# Patient Record
Sex: FEMALE | Race: WHITE
[De-identification: names, ages, dates, MRNs, and addresses within clinical notes are randomized per-mention and may not be internally consistent; named-entity substitution may affect disease eponyms.]

---

## 2019-02-11 ENCOUNTER — HOSPITAL ENCOUNTER (EMERGENCY)
Dept: HOSPITAL 75 - ER | Age: 64
Discharge: HOME | End: 2019-02-11
Payer: MEDICARE

## 2019-02-11 ENCOUNTER — HOSPITAL ENCOUNTER (OUTPATIENT)
Dept: HOSPITAL 75 - RAD | Age: 64
End: 2019-02-11
Attending: NURSE PRACTITIONER
Payer: MEDICARE

## 2019-02-11 VITALS — BODY MASS INDEX: 51.91 KG/M2 | HEIGHT: 63 IN | WEIGHT: 293 LBS

## 2019-02-11 VITALS — DIASTOLIC BLOOD PRESSURE: 65 MMHG | SYSTOLIC BLOOD PRESSURE: 142 MMHG

## 2019-02-11 DIAGNOSIS — Z87.19: ICD-10-CM

## 2019-02-11 DIAGNOSIS — Z90.49: ICD-10-CM

## 2019-02-11 DIAGNOSIS — E78.49: Primary | ICD-10-CM

## 2019-02-11 DIAGNOSIS — Z90.89: ICD-10-CM

## 2019-02-11 DIAGNOSIS — E78.00: ICD-10-CM

## 2019-02-11 DIAGNOSIS — M19.90: ICD-10-CM

## 2019-02-11 DIAGNOSIS — I10: ICD-10-CM

## 2019-02-11 DIAGNOSIS — K59.00: Primary | ICD-10-CM

## 2019-02-11 DIAGNOSIS — H40.89: ICD-10-CM

## 2019-02-11 DIAGNOSIS — R11.0: ICD-10-CM

## 2019-02-11 DIAGNOSIS — R10.9: ICD-10-CM

## 2019-02-11 LAB
ALBUMIN SERPL-MCNC: 4.2 GM/DL (ref 3.2–4.5)
ALP SERPL-CCNC: 96 U/L (ref 40–136)
ALT SERPL-CCNC: 22 U/L (ref 0–55)
APTT PPP: YELLOW S
BACTERIA #/AREA URNS HPF: (no result) /HPF
BASOPHILS # BLD AUTO: 0 10^3/UL (ref 0–0.1)
BASOPHILS NFR BLD AUTO: 0 % (ref 0–10)
BILIRUB SERPL-MCNC: 0.5 MG/DL (ref 0.1–1)
BILIRUB UR QL STRIP: NEGATIVE
BUN/CREAT SERPL: 26
CALCIUM SERPL-MCNC: 10.6 MG/DL (ref 8.5–10.1)
CHLORIDE SERPL-SCNC: 106 MMOL/L (ref 98–107)
CO2 SERPL-SCNC: 22 MMOL/L (ref 21–32)
CREAT SERPL-MCNC: 1.18 MG/DL (ref 0.6–1.3)
EOSINOPHIL # BLD AUTO: 0.2 10^3/UL (ref 0–0.3)
EOSINOPHIL NFR BLD AUTO: 2 % (ref 0–10)
ERYTHROCYTE [DISTWIDTH] IN BLOOD BY AUTOMATED COUNT: 13 % (ref 10–14.5)
FIBRINOGEN PPP-MCNC: CLEAR MG/DL
GFR SERPLBLD BASED ON 1.73 SQ M-ARVRAT: 46 ML/MIN
GLUCOSE SERPL-MCNC: 154 MG/DL (ref 70–105)
GLUCOSE UR STRIP-MCNC: NEGATIVE MG/DL
HCT VFR BLD CALC: 39 % (ref 35–52)
HGB BLD-MCNC: 13.2 G/DL (ref 11.5–16)
KETONES UR QL STRIP: (no result)
LEUKOCYTE ESTERASE UR QL STRIP: NEGATIVE
LYMPHOCYTES # BLD AUTO: 2.5 X 10^3 (ref 1–4)
LYMPHOCYTES NFR BLD AUTO: 28 % (ref 12–44)
MANUAL DIFFERENTIAL PERFORMED BLD QL: NO
MCH RBC QN AUTO: 30 PG (ref 25–34)
MCHC RBC AUTO-ENTMCNC: 34 G/DL (ref 32–36)
MCV RBC AUTO: 90 FL (ref 80–99)
MONOCYTES # BLD AUTO: 0.6 X 10^3 (ref 0–1)
MONOCYTES NFR BLD AUTO: 7 % (ref 0–12)
NEUTROPHILS # BLD AUTO: 5.5 X 10^3 (ref 1.8–7.8)
NEUTROPHILS NFR BLD AUTO: 62 % (ref 42–75)
NITRITE UR QL STRIP: NEGATIVE
PH UR STRIP: 5 [PH] (ref 5–9)
PLATELET # BLD: 359 10^3/UL (ref 130–400)
PMV BLD AUTO: 10.1 FL (ref 7.4–10.4)
POTASSIUM SERPL-SCNC: 4.1 MMOL/L (ref 3.6–5)
PROT SERPL-MCNC: 7.1 GM/DL (ref 6.4–8.2)
PROT UR QL STRIP: NEGATIVE
RBC #/AREA URNS HPF: (no result) /HPF
SODIUM SERPL-SCNC: 140 MMOL/L (ref 135–145)
SP GR UR STRIP: 1.02 (ref 1.02–1.02)
SQUAMOUS #/AREA URNS HPF: (no result) /HPF
UROBILINOGEN UR-MCNC: NORMAL MG/DL
WBC # BLD AUTO: 8.8 10^3/UL (ref 4.3–11)
WBC #/AREA URNS HPF: (no result) /HPF

## 2019-02-11 PROCEDURE — 87186 SC STD MICRODIL/AGAR DIL: CPT

## 2019-02-11 PROCEDURE — 85025 COMPLETE CBC W/AUTO DIFF WBC: CPT

## 2019-02-11 PROCEDURE — 74177 CT ABD & PELVIS W/CONTRAST: CPT

## 2019-02-11 PROCEDURE — 36415 COLL VENOUS BLD VENIPUNCTURE: CPT

## 2019-02-11 PROCEDURE — 74019 RADEX ABDOMEN 2 VIEWS: CPT

## 2019-02-11 PROCEDURE — 87077 CULTURE AEROBIC IDENTIFY: CPT

## 2019-02-11 PROCEDURE — 87088 URINE BACTERIA CULTURE: CPT

## 2019-02-11 PROCEDURE — 80053 COMPREHEN METABOLIC PANEL: CPT

## 2019-02-11 PROCEDURE — 86141 C-REACTIVE PROTEIN HS: CPT

## 2019-02-11 PROCEDURE — 81000 URINALYSIS NONAUTO W/SCOPE: CPT

## 2019-02-11 NOTE — XMS REPORT
MU2 Ambulatory Summary

 Created on: 2016



Elena Deluca

External Reference #: [id number]

: 1955

Sex: Female



Demographics







 Address  [address]

[place]  [address]

 

 Home Phone  [phone]

 

 Preferred Language  English

 

 Marital Status  

 

 Congregational Affiliation  Unknown

 

 Race  White

 

 Ethnic Group  Not  or 





Author







 Author  Brain Hauser

 

 [facility]

 

 Address  [address]

[place], [place]  [id number]



 

 Phone  [phone]







Care Team Providers







 Care Team Member Name  Role  Phone

 

 Brain Hauser  PCP  Unavailable







Allergies and Adverse Reactions







 Name  Reaction  Notes

 

 NO KNOWN DRUG ALLERGIES      







Plan of Treatment

Not available.



Medications







 Active 

 

 Name  Start Date  Estimated Completion Date  SIG  Comments

 

 Calcium 600mg        one tablet daily   

 

 Multivitamin Oral Tablet        take 1 tablet by oral route daily   

 

 aspirin 81 mg oral tablet        take 1 tablet (81 mg) by oral route once 
daily   

 

 Fish Oil 1,000 mg oral capsule  2010     take 2 capsules by oral route 
daily   

 

 valsartan-hydrochlorothiazide 160-12.5 mg oral tablet  2014     TAKE ONE 
TABLET BY MOUTH EVERY DAY   

 

 simvastatin 20 mg oral tablet  2014     TAKE ONE TABLET BY MOUTH EVERY 
DAY IN THE EVENING   

 

 Osteo Bi-Flex 250-200 mg oral tablet        take 1 tablet by oral route daily 
  

 

 simvastatin 20 mg oral tablet  2014     TAKE ONE TABLET BY MOUTH EVERY 
DAY IN THE EVENING   

 

 valsartan-hydrochlorothiazide 160-12.5 mg oral tablet  2015     TAKE ONE 
TABLET BY MOUTH EVERY DAY   

 

 simvastatin 20 mg oral tablet  2015     TAKE ONE TABLET BY MOUTH ONCE 
DAILY AT BEDTIME   

 

 valsartan-hydrochlorothiazide 160-12.5 mg oral tablet  2015     TAKE ONE 
TABLET BY MOUTH ONCE DAILY   

 

 valsartan-hydrochlorothiazide 160-12.5 mg oral tablet  8/3/2015     TAKE ONE 
TABLET BY MOUTH ONCE DAILY   

 

 simvastatin 20 mg oral tablet  2015     TAKE ONE TABLET BY MOUTH ONCE 
DAILY AT BEDTIME   

 

 amlodipine 10 mg oral tablet  10/18/2015     TAKE ONE TABLET BY MOUTH ONCE 
DAILY   

 

 valsartan-hydrochlorothiazide 160-12.5 mg oral tablet  2016     TAKE ONE 
TABLET BY MOUTH ONCE DAILY   

 

 simvastatin 20 mg oral tablet  2016     TAKE ONE TABLET BY MOUTH ONCE 
DAILY AT BEDTIME   

 

 valsartan-hydrochlorothiazide 160-12.5 mg oral tablet  2016     TAKE ONE 
TABLET BY MOUTH ONCE DAILY   

 

 simvastatin 20 mg oral tablet  2016     TAKE ONE TABLET BY MOUTH ONCE 
DAILY AT BEDTIME   

 

 amlodipine 10 mg oral tablet  10/9/2016     TAKE ONE TABLET BY MOUTH ONCE 
DAILY   









  

 

 Name  Start Date  Expiration Date  SIG  Comments

 

 Augmentin 500-125 mg oral tablet  10/8/2012  10/15/2012  take 1 tablet by oral 
route every 12 hours for 7 days   

 

 Diflucan 100 mg oral tablet  10/8/2012  10/11/2012  take 1 tablet (100 mg) by 
oral route once daily   

 

 amoxicillin 500 mg oral capsule  2013  take 1 capsule (500 mg) 
by oral route 3 times per day for 10 days   

 

 simvastatin 20 mg oral tablet  9/10/2013  10/10/2013  TAKE ONE TABLET BY MOUTH 
EVERY DAY IN THE EVENING   

 

 amlodipine 10 mg oral tablet  2014  TAKE ONE TABLET BY MOUTH 
EVERY DAY   

 

 Augmentin 500-125 mg oral tablet  2015  take 1 tablet by oral 
route every 12 hours for 7 days   









 Discontinued 

 

 Name  Start Date  Discontinued Date  SIG  Comments

 

 niacin 500 mg oral tablet extended release 24 hr     2011  take 1 tablets 
(500 mg) by oral route once daily  Starting on Simvastatin

 

 Vesicare 5 mg oral tablet  2011  take 1 tablet by oral route 
daily for 30 days   

 

 Osteo Bi-Flex (5-Loxin) 1,500-400-100 mg-unit-mg oral tablet     2013  
take 1 tablet by oral route daily   

 

 Diovan -12.5 mg oral tablet  2012  TAKE ONE TABLET BY 
MOUTH EVERY DAY  generic on list

 

 Medrol (Lei) 4 mg oral tablets,dose pack  2013  take as 
directed   

 

 nystatin-triamcinolone 100,000-0.1 unit/g-% topical cream  2013  apply to affected area(s) by topical route 2 times a day   

 

 fexofenadine 180 mg oral tablet  2013  take 1 tablet (180 mg) 
by oral route once daily   

 

 prednisone 20 mg oral tablet  10/26/2015  2016  take 3 tablets (60 mg) by 
oral route once daily for 3 days then 2 tablets (40 mg) daily for 2 days   

 

 cyclobenzaprine 10 mg oral tablet  10/30/2015  2016  take 1 tablet (10 mg
) by oral route 3 times per day   







Problem List







 Description  Status  Onset

 

 Arthritis unspecified  Active   

 

 Hyperlipidemia  Active   

 

 Hypertension  Active   







Vital Signs







 Date  Time  BP-Sys(mm[Hg]  BP-Edelmira(mm[Hg])  HR(bpm)  RR(rpm)  Temp  WT  HT  HC  
BMI  BSA  BMI Percentile  O2 Sat(%)

 

 2016  7:58:00 AM  136 mmHg  70 mmHg  85 bpm  20 rpm  98.1 F  391 lbs  64 
in     67.11 kg/m2  2.83 m2     95 %

 

 10/26/2015  8:35:00 AM  124 mmHg  70 mmHg  80 bpm  18 rpm  97.1 F     64 in   
            

 

 2015  9:50:00 AM  132 mmHg  88 mmHg  90 bpm  22 rpm  98.5 F  387 lbs  64 
in     66.43 kg/m2  2.82 m2     97 %

 

 2014  10:36:00 AM  142 mmHg  80 mmHg  78 bpm  22 rpm  97.9 F  381 lbs  64 
in     65.40 kg/m2  2.7935 m     97 %

 

 2013  8:04:00 AM  138 mmHg  70 mmHg  88 bpm  22 rpm  98.1 F  388 lbs  64 
in     66.5994 kg/m  2.82 m2     98 %

 

 2013  9:54:00 AM  136 mmHg  70 mmHg  88 bpm  24 rpm  97.1 F  386 lbs  64 
in     66.26 kg/m2  2.8118 m     97 %

 

 2013  9:47:00 AM  134 mmHg  78 mmHg  70 bpm  16 rpm  97.8 F  381 lbs  64 
in     65.3978 kg/m  2.79 m2      

 

 2013  10:27:00 AM  136 mmHg  84 mmHg  82 bpm  22 rpm  98.2 F  384.2 lbs  
64 in     65.95 kg/m2  2.8052 m     96 %

 

 2012  7:59:00 AM  138 mmHg  72 mmHg  70 bpm  18 rpm  98 F  377 lbs  64 
in     64.7112 kg/m  2.78 m2      

 

 10/8/2012  8:29:00 AM  138 mmHg  70 mmHg  72 bpm  18 rpm  98.1 F  371 lbs  64 
in     63.68 kg/m2  2.7566 m      

 

 2012  10:57:00 AM  136 mmHg  72 mmHg  68 bpm  18 rpm  98 F  371 lbs  64 
in     63.6814 kg/m  2.76 m2      

 

 2011  8:43:00 AM  144 mmHg  78 mmHg  72 bpm  18 rpm  98.2 F  396 lbs  64 
in     67.97 kg/m2  2.848 m      

 

 2010  1:51:00 PM  142 mmHg  80 mmHg  76 bpm  16 rpm  98.7 F  382 lbs  64 
in     65.5695 kg/m  2.80 m2      

 

 2009  8:23:00 AM  132 mmHg  78 mmHg  70 bpm  24 rpm  98.3 F  377 lbs  64 
in     64.71 kg/m2  2.7788 m      







Social History







 Name  Description  Comments

 

 Tobacco  Former smoker  for 2 years

 

       







History of Procedures







 Date Ordered  Description  Order Status

 

 10/26/2015 12:00 AM  X-RAY EXAM OF HIP  Reviewed

 

 10/26/2015 12:00 AM  X-RAY EXAM OF KNEE 3  Reviewed

 

 2011 12:00 AM  COMPREHEN METABOLIC PANEL  Reviewed

 

 2011 12:00 AM  LIPID PANEL  Reviewed

 

 2011 12:00 AM  GLYCOSYLATED HEMOGLOBIN TEST  Reviewed

 

 2011 12:00 AM  CYTOPATH C/V THIN LAYER  Reviewed

 

 2011 12:00 AM  MAMMOGRAM SCREENING  Reviewed

 

 2012 12:00 AM  ASSAY GLUCOSE BLOOD QUANT  Reviewed

 

 10/20/2016 12:00 AM  COMPREHEN METABOLIC PANEL  Reviewed

 

 10/20/2016 12:00 AM  LIPID PANEL  Reviewed

 

 10/20/2016 12:00 AM  GLYCOSYLATED HEMOGLOBIN TEST  Reviewed

 

 10/20/2016 12:00 AM  MICROALBUMIN QUANTITATIVE  Reviewed

 

 10/20/2016 12:00 AM  VITAMIN B-12  Reviewed

 

 2012 12:00 AM  TDAP VACCINE 7 YRS/> IM  Reviewed

 

 2009 12:00 AM  COMPREHEN METABOLIC PANEL  Reviewed

 

 2009 12:00 AM  LIPID PANEL  Reviewed

 

 2009 12:00 AM  GLYCOSYLATED HEMOGLOBIN TEST  Reviewed

 

 2009 12:00 AM  MAMMOGRAM SCREENING  Reviewed

 

 12/10/2012 12:00 AM  COMPREHEN METABOLIC PANEL  Reviewed

 

 12/10/2012 12:00 AM  LIPID PANEL  Reviewed

 

 12/10/2012 12:00 AM  GLYCOSYLATED HEMOGLOBIN TEST  Reviewed

 

 2012 12:00 AM  MAMMOGRAM SCREENING  Reviewed

 

 2012 12:00 AM  ASSAY GLUCOSE BLOOD QUANT  Reviewed

 

 2013 12:00 AM  COMPREHEN METABOLIC PANEL  Reviewed

 

 2013 12:00 AM  LIPID PANEL  Reviewed

 

 2013 12:00 AM  GLYCOSYLATED HEMOGLOBIN TEST  Reviewed

 

 2013 12:00 AM  CYTOPATH C/V THIN LAYER  Reviewed

 

 2013 12:00 AM  MAMMOGRAM SCREENING  Reviewed

 

 2009 12:00 AM  CYTOPATH C/V THIN LAYER  Reviewed

 

 2010 12:00 AM  COMPREHEN METABOLIC PANEL  Reviewed

 

 2010 12:00 AM  LIPID PANEL  Reviewed

 

 2010 12:00 AM  GLYCOSYLATED HEMOGLOBIN TEST  Reviewed

 

 2010 12:00 AM  CYTOPATH C/V THIN LAYER  Reviewed

 

 2010 12:00 AM  MAMMOGRAM SCREENING  Reviewed

 

 2014 12:00 AM  COMPREHEN METABOLIC PANEL  Reviewed

 

 2014 12:00 AM  LIPID PANEL  Reviewed

 

 2014 12:00 AM  GLYCOSYLATED HEMOGLOBIN TEST  Reviewed







Results Summary







 Data and Description  Results

 

 2009 10:01 AM  .0 mg/dLCHOLESTEROL 258 mg/dLTRIGLYCERIDES 252.0 mg
/dLHDL 51.0 mg/dLLDL 153.0 mg/dLTRIGLYCERIDES 252.0 mg/dLHDL 51.0 mg/
dLCHOLESTEROL 258 mg/dLGLUCOSE 116 SODIUM 144.0 mmol/LPOTASSIUM 4.80 mmol/
LCHLORIDE 105.0 mmol/LCO2 24.0 mmol/LBUN 18.0 mg/dLCREATININE 0.80 mg/dLSGOT/
AST 23.0 IU/LSGPT/ALT 26.0 IU/LALK PHOS 98.0 IU/LTOTAL PROTEIN 7.30 g/dLALBUMIN 
4.20 g/dLTOTAL BILI 0.40 mg/dLCALCIUM 10.30 mg/dLeGFR >60 mL/minGLUCOSE 116 
SODIUM 144.0 mmol/LPOTASSIUM 4.80 mmol/LCHLORIDE 105.0 mmol/LCO2 24.0 mmol/LBUN 
18.0 mg/dLCREATININE 0.80 mg/dLSGOT/AST 23.0 IU/LSGPT/ALT 26.0 IU/LALK PHOS 
98.0 IU/LTOTAL PROTEIN 7.30 g/dLALBUMIN 4.20 g/dLTOTAL BILI 0.40 mg/dLCALCIUM 
10.30 mg/dLeGFR >60 mL/min

 

 2011 9:00 AM  GLUCOSE 130.0 mg/dLSODIUM 139.0 mmol/LPOTASSIUM 4.40 mmol/
LCHLORIDE 104.0 mmol/LCO2 26.0 mmol/LBUN 21.0 mg/dLCREATININE 0.80 mg/dLSGOT/
AST 19.0 IU/LSGPT/ALT 25.0 IU/LALK PHOS 108.0 IU/LTOTAL PROTEIN 7.10 g/
dLALBUMIN 4.20 g/dLTOTAL BILI 0.40 mg/dLCALCIUM 10.20 mg/dLeGFR >60 mL/min/1.73 
[id number] 227.0 mg/dLCHOLESTEROL 282.0 mg/dLHDL 45.0 mg/dLLDL (CALC) 
192.0 mg/dL

 

 2012 9:42 AM  GLUCOSE 131.0 mg/dL

 

 2012 10:02 AM  GLUCOSE 150.0 mg/dLSODIUM 140.0 mmol/LPOTASSIUM 4.60 mmol/
LCHLORIDE 104.0 mmol/LCO2 25.0 mmol/LBUN 21.0 mg/dLCREATININE 0.90 mg/dLSGOT/
AST 19.0 IU/LSGPT/ALT 26.0 IU/LALK PHOS 99.0 IU/LTOTAL PROTEIN 7.10 g/dLALBUMIN 
4.20 g/dLTOTAL BILI 0.50 mg/dLCALCIUM 10.80 mg/dLeGFR 60 TRIGLYCERIDES 210.0 mg/
dLCHOLESTEROL 241.0 mg/dLHDL 48.0 mg/dLLDL (CALC) 151.0 mg/dL

 

 2012 8:54 AM  GLUCOSE 125.0 mg/dL

 

 12/3/2013 8:58 AM  TRIGLYCERIDES 210.0 mg/dLCHOLESTEROL 211.0 mg/dLHDL 49.0 mg/
dLLDL (CALC) 120.0 mg/dLGLUCOSE 125.0 mg/dLSODIUM 140.0 mmol/LPOTASSIUM 4.20 
mmol/LCHLORIDE 105.0 mmol/LCO2 25.0 mmol/LBUN 24.0 mg/dLCREATININE 0.90 mg/
dLSGOT/AST 23.0 IU/LSGPT/ALT 28.0 IU/LALK PHOS 111.0 IU/LTOTAL PROTEIN 7.0 g/
dLALBUMIN 4.30 g/dLTOTAL BILI 0.60 mg/dLCALCIUM 10.60 mg/dLeGFR >60 mL/min/1.73 
m2HGB A1C 6.90 %Est Avg Glucose 151.3 mg/dL

 

 2014 9:05 AM  TRIGLYCERIDES 210.0 mg/dLCHOLESTEROL 204.0 mg/dLHDL 51.0 mg/
dLLDL (CALC) 111.0 mg/dLGLUCOSE 120.0 mg/dLSODIUM 141.0 mmol/LPOTASSIUM 4.40 
mmol/LCHLORIDE 103.0 mmol/LCO2 25.0 mmol/LBUN 18.0 mg/dLCREATININE 0.90 mg/
dLSGOT/AST 24.0 IU/LSGPT/ALT 29.0 IU/LALK PHOS 98.0 IU/LTOTAL PROTEIN 7.0 g/
dLALBUMIN 4.10 g/dLTOTAL BILI 0.60 mg/dLCALCIUM 10.20 mg/dLeGFR 60 HGB A1C 6.80 
%Est Avg Glucose 148.5 mg/dL

 

 10/24/2016 10:22 AM  HGB A1C 6.50 %Est Avg Glucose 139.9 mg/dLGLUCOSE 129.0 mg/
dLSODIUM 143.0 mmol/LPOTASSIUM 4.40 mmol/LCHLORIDE 104.0 mmol/LCO2 24.0 mmol/
LBUN 25.0 mg/dLCREATININE 0.90 mg/dLSGOT/AST 25.0 IU/LSGPT/ALT 35.0 IU/LALK 
PHOS 96.0 IU/LTOTAL PROTEIN 7.10 g/dLALBUMIN 4.20 g/dLTOTAL BILI 0.50 mg/
dLCALCIUM 10.30 mg/dLeGFR >60 mL/min/1.73mTRIGLYCERIDES 181.0 mg/
dLCHOLESTEROL 201.0 mg/dLHDL 47.0 mg/dLLDL (CALC) 118.0 mg/dLMICROALBUMIN UR <
0.5 ug/mLVITAMIN B12 485.0 pg/mL







History Of Immunizations







 Name  Date Admin  Mfg Name  Mfg Code  Trade Name  Lot#  Route  Inj  Vis Given  
Vis Pub  CVX

 

 Tdap  2012  [employer]  SKB  ADACEL  [id number]  Intramuscular  Right 
Deltoid  2008  115

 

 Influenza  2015  Not Entered  NE  Afluria     Not Entered  Not Entered  1  141

 

 Influenza  2016  Not Entered  NE  Afluria     Not Entered  Not Entered  1  141







History of Past Illness







 Name  Date of Onset  Comments

 

 Hypertension  Dec  7 2009  1:45PM   

 

 Glucose Intolerance  Dec  7 2009  1:45PM   

 

 Hyperlipidemia  Dec  7 2009  1:45PM   

 

 Screening Mammogram  Dec  7 2009  1:45PM   

 

 Routine gynecological examination  Dec 21 2009  8:25AM   

 

 Hypertension Stable  Dec 21 2009  8:25AM   

 

 Hyperlipidemia, unspecified Stable  Dec 21 2009  8:25AM   

 

 Arthritis unspecified      

 

 Hyperlipidemia      

 

 Hypertension      

 

 Hypertension  Dec 17 2010  1:41PM   

 

 Glucose Intolerance  Dec 17 2010  1:41PM   

 

 Hyperlipidemia  Dec 17 2010  1:41PM   

 

 Hyperglycemia  Dec 17 2010  1:41PM   

 

 Routine gynecological examination  Dec 20 2010  1:52PM   

 

 Overactive bladder  Dec 20 2010  1:52PM   

 

 Hypertension  2011 12:05PM   

 

 Glucose Intolerance  2011 12:05PM   

 

 Hyperlipidemia  2011 12:05PM   

 

 Hyperglycemia  2011 12:05PM   

 

 Routine gynecological examination  Dec  8 2011  8:45AM   

 

 Overactive bladder  Dec  8 2011  8:45AM   

 

 Screening Examination for Breast Cancer  Dec  8 2011  8:45AM   

 

 Hyperglycemia  Dec  8 2011  8:45AM   

 

 Laceration 2nd digit right hand  May 21 2012 10:59AM   

 

 Acute Pharyngitis  Oct  8 2012  8:30AM   

 

 Hypertension  Dec 10 2012 11:59AM   

 

 Glucose Intolerance  Dec 10 2012 11:59AM   

 

 Hyperlipidemia  Dec 10 2012 11:59AM   

 

 Hyperglycemia  Dec 10 2012 11:59AM   

 

 Screening Examination for Breast Cancer  Dec 19 2012  8:02AM   

 

 Hypertension  Dec 19 2012  8:02AM   

 

 Hyperlipidemia, Mixed  Dec 19 2012  8:02AM   

 

 Hyperglycemia  Dec 19 2012  8:02AM   

 

 Upper Respiratory Infections  May 23 2013 10:32AM   

 

 Eczema  Aug 14 2013  9:49AM   

 

 Eustachian Tube Dysfunction, Bilateral  Aug 14 2013  9:49AM   

 

 Worried well  Sep 16 2013  9:56AM   

 

 Hypertension  Dec  2 2013 11:57AM   

 

 Glucose Intolerance  Dec  2 2013 11:57AM   

 

 Hyperlipidemia  Dec  2 2013 11:57AM   

 

 Hyperglycemia  Dec  2 2013 11:57AM   

 

 Routine gynecological examination  Dec 17 2013  8:06AM   

 

 Screening Examination for Breast Cancer  Dec 17 2013  8:06AM   

 

 Hypertension  2014 11:58AM   

 

 Glucose Intolerance  2014 11:58AM   

 

 Hyperlipidemia  2014 11:58AM   

 

 Hyperglycemia  2014 11:58AM   

 

 Essential Hypertension  2014 10:39AM   

 

 Diabetes Mellitus, Type II  2014 10:39AM   

 

 Hyperlipidemia, Mixed  2014 10:39AM   

 

 Sinusitis, Acute  Sep 14 2015  9:52AM   

 

 Bronchitis, Acute  Sep 14 2015  9:52AM   

 

 Arthritis unspecified  Oct 26 2015  8:37AM   

 

 Hypertension  Oct 20 2016  9:16AM   

 

 Diabetes Mellitus, Type II  Oct 20 2016  9:16AM   

 

 Hyperlipidemia  Oct 20 2016  9:16AM   

 

 Routine gynecological examination  2016  8:11AM   

 

 Screening Examination for Breast Cancer  2016  8:11AM   

 

 Visit for screening mammogram  2016  8:11AM   

 

 Hyperlipidemia  2016  8:11AM   

 

 Hypertension  2016  8:11AM   

 

 Controlled diabetes mellitus type II without complication  2016  8:11AM
   







Payers







 Insurance Name  Company Name  Plan Name  Plan Number  Policy Number  Policy 
Group Number  Start Date

 

    Medicare Part A  Medicare Part A     [id number]     2008

 

    Medicare Part A  Medicare - Lab/Xray     [id number]     2008

 

    Medicare Part B  [employer] [place]     [id number]     2008







History of Encounters







 Visit Date  Visit Type  Provider

 

 2016  Office visit  Brain Hauser DO

 

 10/26/2015  Office visit  Brain Hauser DO

 

 2015  Office visit  Brain Hauser DO

 

 2014  Office visit  Brain Hauser DO

 

 2013  Office visit  Brain Hauser DO

 

 2013  Office visit  Brain Hauser DO

 

 2013  Office visit  Barin Hauser DO

 

 2013  Office visit  Margie RODAS

 

 2012  Office visit  Brain Hauser DO

 

 10/8/2012  Office visit  Brain Hauser DO

 

 2012  Office visit  Brain Hauser DO

 

 2011  Office visit  Brain Hauser DO

 

 2010  Office visit  Brain Hauser DO

 

 2009  Office visit  Brain Hauser DO

 

 2009  Office visit  Brain Hauser DO

## 2019-02-11 NOTE — XMS REPORT
MU2 Ambulatory Summary

 Created on: 2018



Elena Deluca

External Reference #: [id number]

: 1955

Sex: Female



Demographics







 Address  [address] [place]  [address]

 

 Home Phone  [phone]

 

 Preferred Language  English

 

 Marital Status  

 

 Anabaptist Affiliation  Unknown

 

 Race  White

 

 Ethnic Group  Not  or 





Author







 Author  Brain Hauser

 

 [facility]

 

 Address  [address]

[place], [place]  [id number]



 

 Phone  [phone]







Care Team Providers







 Care Team Member Name  Role  Phone

 

 Brain Hauser  PCP  [phone]

 

 Brain Hauser  PreferredProvider  [phone]







Allergies and Adverse Reactions







 Name  Reaction  Notes

 

 NO KNOWN DRUG ALLERGIES      







Plan of Treatment







 Planned Activity  Comments  Planned Date  Planned Time  Plan/Goal

 

 Discuss Bariatric Surgery options. She needs to lose 100 lbs in order to have 
Left Total Knee Replacement.            







Medications







 Active 

 

 Name  Start Date  Estimated Completion Date  SIG  Comments

 

 Multivitamin Oral Tablet        take 1 tablet by oral route daily   

 

 aspirin 81 mg oral tablet        take 1 tablet (81 mg) by oral route once 
daily   

 

 Fish Oil 1,000 mg oral capsule  2010     take 2 capsules by oral route 
daily   

 

 valsartan-hydrochlorothiazide 160-12.5 mg oral tablet  2014     TAKE ONE 
TABLET BY MOUTH EVERY DAY   

 

 simvastatin 20 mg oral tablet  2014     TAKE ONE TABLET BY MOUTH EVERY 
DAY IN THE EVENING   

 

 simvastatin 20 mg oral tablet  2014     TAKE ONE TABLET BY MOUTH EVERY 
DAY IN THE EVENING   

 

 valsartan-hydrochlorothiazide 160-12.5 mg oral tablet  2015     TAKE ONE 
TABLET BY MOUTH EVERY DAY   

 

 simvastatin 20 mg oral tablet  2015     TAKE ONE TABLET BY MOUTH ONCE 
DAILY AT BEDTIME   

 

 valsartan-hydrochlorothiazide 160-12.5 mg oral tablet  2015     TAKE ONE 
TABLET BY MOUTH ONCE DAILY   

 

 valsartan-hydrochlorothiazide 160-12.5 mg oral tablet  8/3/2015     TAKE ONE 
TABLET BY MOUTH ONCE DAILY   

 

 simvastatin 20 mg oral tablet  2015     TAKE ONE TABLET BY MOUTH ONCE 
DAILY AT BEDTIME   

 

 amlodipine 10 mg oral tablet  10/18/2015     TAKE ONE TABLET BY MOUTH ONCE 
DAILY   

 

 valsartan-hydrochlorothiazide 160-12.5 mg oral tablet  2016     TAKE ONE 
TABLET BY MOUTH ONCE DAILY   

 

 simvastatin 20 mg oral tablet  2016     TAKE ONE TABLET BY MOUTH ONCE 
DAILY AT BEDTIME   

 

 valsartan-hydrochlorothiazide 160-12.5 mg oral tablet  2016     TAKE ONE 
TABLET BY MOUTH ONCE DAILY   

 

 simvastatin 20 mg oral tablet  2016     TAKE ONE TABLET BY MOUTH ONCE 
DAILY AT BEDTIME   

 

 amlodipine 10 mg oral tablet  10/9/2016     TAKE ONE TABLET BY MOUTH ONCE 
DAILY   

 

 amlodipine 10 mg oral tablet  2017     TAKE ONE TABLET BY MOUTH ONCE DAILY
   

 

 valsartan-hydrochlorothiazide 160-12.5 mg oral tablet  2017     TAKE ONE 
TABLET BY MOUTH ONCE DAILY   

 

 magnesium 250 mg oral tablet        take 1 tablet by oral route daily   

 

 potassium 99 mg oral tablet        take 1 tablet by oral route daily   

 

 ibuprofen 800 mg oral tablet  2018     take 1 tablet (800 mg) by oral 
route 3 times per day with food as needed   









  

 

 Name  Start Date  Expiration Date  SIG  Comments

 

 Augmentin 500-125 mg oral tablet  10/8/2012  10/15/2012  take 1 tablet by oral 
route every 12 hours for 7 days   

 

 Diflucan 100 mg oral tablet  10/8/2012  10/11/2012  take 1 tablet (100 mg) by 
oral route once daily   

 

 amoxicillin 500 mg oral capsule  2013  take 1 capsule (500 mg) 
by oral route 3 times per day for 10 days   

 

 simvastatin 20 mg oral tablet  9/10/2013  10/10/2013  TAKE ONE TABLET BY MOUTH 
EVERY DAY IN THE EVENING   

 

 amlodipine 10 mg oral tablet  2014  TAKE ONE TABLET BY MOUTH 
EVERY DAY   

 

 Augmentin 500-125 mg oral tablet  2015  take 1 tablet by oral 
route every 12 hours for 7 days   

 

 clindamycin HCl 300 mg oral capsule  3/27/2017  4/3/2017  take 1 capsule (300 
mg) by oral route 2 times per day for 7 days   

 

 simvastatin 20 mg oral tablet  2017  TAKE ONE TABLET BY 
MOUTH ONCE DAILY AT BEDTIME   

 

 valsartan-hydrochlorothiazide 160-12.5 mg oral tablet  2018  
TAKE ONE TABLET BY MOUTH ONCE DAILY   

 

 amlodipine 10 mg oral tablet  2018  TAKE ONE TABLET BY MOUTH 
ONCE DAILY   









 Discontinued 

 

 Name  Start Date  Discontinued Date  SIG  Comments

 

 niacin 500 mg oral tablet extended release 24 hr     2011  take 1 tablets 
(500 mg) by oral route once daily  Starting on Simvastatin

 

 Calcium 600mg     2018  one tablet daily   

 

 Vesicare 5 mg oral tablet  2011  take 1 tablet by oral route 
daily for 30 days   

 

 Osteo Bi-Flex (5-Loxin) 1,500-400-100 mg-unit-mg oral tablet     2013  
take 1 tablet by oral route daily   

 

 Diovan -12.5 mg oral tablet  2012  TAKE ONE TABLET BY 
MOUTH EVERY DAY  generic on list

 

 Medrol (Lei) 4 mg oral tablets,dose pack  2013  take as 
directed   

 

 nystatin-triamcinolone 100,000-0.1 unit/g-% topical cream  2013  apply to affected area(s) by topical route 2 times a day   

 

 fexofenadine 180 mg oral tablet  2013  take 1 tablet (180 mg) 
by oral route once daily   

 

 Osteo Bi-Flex 250-200 mg oral tablet     2018  take 1 tablet by oral route 
daily   

 

 prednisone 20 mg oral tablet  10/26/2015  2016  take 3 tablets (60 mg) by 
oral route once daily for 3 days then 2 tablets (40 mg) daily for 2 days   

 

 cyclobenzaprine 10 mg oral tablet  10/30/2015  2016  take 1 tablet (10 mg
) by oral route 3 times per day   

 

 promethazine-codeine 6.25-10 mg/5 mL oral syrup  3/27/2017  10/12/2017  take 5 
milliliters by oral route every 6 hours as needed, not to exceed 30 mL in 24 
hours   

 

 cyclobenzaprine 10 mg oral tablet  10/12/2017  2018  take 1 tablet (10 mg) 
by oral route at bedtime   







Problem List







 Description  Status  Onset

 

 Arthritis unspecified  Active   

 

 Hyperlipidemia  Active   

 

 Hypertension  Active   







Vital Signs







 Date  Time  BP-Sys(mm[Hg]  BP-Edelmira(mm[Hg])  HR(bpm)  RR(rpm)  Temp  WT  HT  HC  
BMI  BSA  BMI Percentile  O2 Sat(%)

 

 2018  8:02:00 AM  122 mmHg  60 mmHg  74 bpm  22 rpm  98.2 F  366 lbs  64 
in     62.8231 kg/m  2.738 m     97 %

 

 10/12/2017  8:06:00 AM  144 mmHg  82 mmHg  77 bpm  16 rpm  97.7 F  398 lbs  64 
in     68.32 kg/m2  2.86 m2     97 %

 

 3/27/2017  9:55:00 AM  127 mmHg  72 mmHg  76 bpm  22 rpm  98.1 F  393.5 lbs  
63 in     69.7047 kg/m  2.8167 m     97 %

 

 2017  9:36:00 AM  132 mmHg  72 mmHg  76 bpm  20 rpm  97.2 F  396.25 lbs  
64 in     68.02 kg/m2  2.85 m2     97 %

 

 2016  7:58:00 AM  136 mmHg  70 mmHg  85 bpm  20 rpm  98.1 F  391 lbs  64 
in     67.1143 kg/m  2.8299 m     95 %

 

 10/26/2015  8:35:00 AM  124 mmHg  70 mmHg  80 bpm  18 rpm  97.1 F     64 in   
            

 

 2015  9:50:00 AM  132 mmHg  88 mmHg  90 bpm  22 rpm  98.5 F  387 lbs  64 
in     66.43 kg/m2  2.82 m2     97 %

 

 2014  10:36:00 AM  142 mmHg  80 mmHg  78 bpm  22 rpm  97.9 F  381 lbs  64 
in     65.3978 kg/m  2.7935 m     97 %

 

 2013  8:04:00 AM  138 mmHg  70 mmHg  88 bpm  22 rpm  98.1 F  388 lbs  64 
in     66.60 kg/m2  2.82 m2     98 %

 

 2013  9:54:00 AM  136 mmHg  70 mmHg  88 bpm  24 rpm  97.1 F  386 lbs  64 
in     66.2561 kg/m  2.8118 m     97 %

 

 2013  9:47:00 AM  134 mmHg  78 mmHg  70 bpm  16 rpm  97.8 F  381 lbs  64 
in     65.40 kg/m2  2.79 m2      

 

 2013  10:27:00 AM  136 mmHg  84 mmHg  82 bpm  22 rpm  98.2 F  384.2 lbs  
64 in     65.9471 kg/m  2.8052 m     96 %

 

 2012  7:59:00 AM  138 mmHg  72 mmHg  70 bpm  18 rpm  98 F  377 lbs  64 
in     64.71 kg/m2  2.78 m2      

 

 10/8/2012  8:29:00 AM  138 mmHg  70 mmHg  72 bpm  18 rpm  98.1 F  371 lbs  64 
in     63.6814 kg/m  2.7566 m      

 

 2012  10:57:00 AM  136 mmHg  72 mmHg  68 bpm  18 rpm  98 F  371 lbs  64 
in     63.68 kg/m2  2.76 m2      

 

 2011  8:43:00 AM  144 mmHg  78 mmHg  72 bpm  18 rpm  98.2 F  396 lbs  64 
in     67.9725 kg/m  2.848 m      

 

 2010  1:51:00 PM  142 mmHg  80 mmHg  76 bpm  16 rpm  98.7 F  382 lbs  64 
in     65.57 kg/m2  2.80 m2      

 

 2009  8:23:00 AM  132 mmHg  78 mmHg  70 bpm  24 rpm  98.3 F  377 lbs  64 
in     64.7112 kg/m  2.7788 m      







Social History







 Name  Description  Comments

 

 Tobacco  Former smoker  for 2 years

 

       







History of Procedures







 Date Ordered  Description  Order Status

 

 10/26/2015 12:00 AM  X-RAY EXAM OF HIP  Reviewed

 

 10/26/2015 12:00 AM  X-RAY EXAM OF KNEE 3  Reviewed

 

 2011 12:00 AM  COMPREHEN METABOLIC PANEL  Reviewed

 

 2011 12:00 AM  LIPID PANEL  Reviewed

 

 2011 12:00 AM  GLYCOSYLATED HEMOGLOBIN TEST  Reviewed

 

 2011 12:00 AM  CYTOPATH C/V THIN LAYER  Reviewed

 

 2011 12:00 AM  MAMMOGRAM SCREENING  Reviewed

 

 2012 12:00 AM  ASSAY GLUCOSE BLOOD QUANT  Reviewed

 

 10/20/2016 12:00 AM  COMPREHEN METABOLIC PANEL  Reviewed

 

 10/20/2016 12:00 AM  LIPID PANEL  Reviewed

 

 10/20/2016 12:00 AM  GLYCOSYLATED HEMOGLOBIN TEST  Reviewed

 

 10/20/2016 12:00 AM  MICROALBUMIN QUANTITATIVE  Reviewed

 

 10/20/2016 12:00 AM  VITAMIN B-12  Reviewed

 

 2017 12:00 AM  RADEX SHOULDER COMPLETE MINIMUM 2 VIEWS  Reviewed

 

 2017 12:00 AM  RADEX HUMERUS MINIMUM 2 VIEWS  Reviewed

 

 2017 12:00 AM  Decadron 8mg Injection  Reviewed

 

 2017 12:00 AM  Depo-Medrol 80mg Injection  Reviewed

 

 2012 12:00 AM  TDAP VACCINE 7 YRS/> IM  Reviewed

 

 2009 12:00 AM  COMPREHEN METABOLIC PANEL  Reviewed

 

 2009 12:00 AM  LIPID PANEL  Reviewed

 

 2009 12:00 AM  GLYCOSYLATED HEMOGLOBIN TEST  Reviewed

 

 2009 12:00 AM  MAMMOGRAM SCREENING  Reviewed

 

 12/10/2012 12:00 AM  COMPREHEN METABOLIC PANEL  Reviewed

 

 12/10/2012 12:00 AM  LIPID PANEL  Reviewed

 

 12/10/2012 12:00 AM  GLYCOSYLATED HEMOGLOBIN TEST  Reviewed

 

 2012 12:00 AM  MAMMOGRAM SCREENING  Reviewed

 

 2012 12:00 AM  ASSAY GLUCOSE BLOOD QUANT  Reviewed

 

 3/19/2018 12:00 AM  COMPLETE CBC W/AUTO DIFF WBC  Reviewed

 

 3/19/2018 12:00 AM  COMPREHEN METABOLIC PANEL  Reviewed

 

 3/19/2018 12:00 AM  LIPID PANEL  Reviewed

 

 3/19/2018 12:00 AM  GLYCOSYLATED HEMOGLOBIN TEST  Reviewed

 

 2013 12:00 AM  COMPREHEN METABOLIC PANEL  Reviewed

 

 2013 12:00 AM  LIPID PANEL  Reviewed

 

 2013 12:00 AM  GLYCOSYLATED HEMOGLOBIN TEST  Reviewed

 

 2013 12:00 AM  CYTOPATH C/V THIN LAYER  Reviewed

 

 2013 12:00 AM  MAMMOGRAM SCREENING  Reviewed

 

 2009 12:00 AM  CYTOPATH C/V THIN LAYER  Reviewed

 

 2010 12:00 AM  COMPREHEN METABOLIC PANEL  Reviewed

 

 2010 12:00 AM  LIPID PANEL  Reviewed

 

 2010 12:00 AM  GLYCOSYLATED HEMOGLOBIN TEST  Reviewed

 

 2010 12:00 AM  CYTOPATH C/V THIN LAYER  Reviewed

 

 2010 12:00 AM  MAMMOGRAM SCREENING  Reviewed

 

 2014 12:00 AM  COMPREHEN METABOLIC PANEL  Reviewed

 

 2014 12:00 AM  LIPID PANEL  Reviewed

 

 2014 12:00 AM  GLYCOSYLATED HEMOGLOBIN TEST  Reviewed







Results Summary







 Date and Description  Results

 

 2011 9:00 AM  GLUCOSE 130.0 mg/dLSODIUM 139.0 mmol/LPOTASSIUM 4.40 mmol/
LCHLORIDE 104.0 mmol/LCO2 26.0 mmol/LBUN 21.0 mg/dLCREATININE 0.80 mg/dLSGOT/
AST 19.0 IU/LSGPT/ALT 25.0 IU/LALK PHOS 108.0 IU/LTOTAL PROTEIN 7.10 g/
dLALBUMIN 4.20 g/dLTOTAL BILI 0.40 mg/dLCALCIUM 10.20 mg/dLAGE 56 GFR NonAA 74 
GFR AA 90 eGFR >60 mL/min/1.73 m2eGFR AA* >60 TRIGLYCERIDES 227.0 mg/
dLCHOLESTEROL 282.0 mg/dLHDL 45.0 mg/dLTOT CHOL/HDL 6.3 LDL (CALC) 192.0 mg/
dLGLYCOHEMOGLOBIN A1C 6.10 %

 

 2012 9:42 AM  GLUCOSE 131.0 mg/dL

 

 2012 10:02 AM  GLUCOSE 150.0 mg/dLSODIUM 140.0 mmol/LPOTASSIUM 4.60 mmol/
LCHLORIDE 104.0 mmol/LCO2 25.0 mmol/LBUN 21.0 mg/dLCREATININE 0.90 mg/dLSGOT/
AST 19.0 IU/LSGPT/ALT 26.0 IU/LALK PHOS 99.0 IU/LTOTAL PROTEIN 7.10 g/dLALBUMIN 
4.20 g/dLTOTAL BILI 0.50 mg/dLCALCIUM 10.80 mg/dLAGE 57 GFR NonAA 65 GFR AA 79 
eGFR 60 eGFR AA* 60 TRIGLYCERIDES 210.0 mg/dLCHOLESTEROL 241.0 mg/dLHDL 48.0 mg/
dLTOT CHOL/HDL 5.0 LDL (CALC) 151.0 mg/dLGLYCOHEMOGLOBIN A1C 6.30 %

 

 2012 8:54 AM  GLUCOSE 125.0 mg/dL

 

 12/3/2013 8:58 AM  TRIGLYCERIDES 210.0 mg/dLCHOLESTEROL 211.0 mg/dLHDL 49.0 mg/
dLTOT CHOL/HDL 4.3 LDL (CALC) 120.0 mg/dLGLUCOSE 125.0 mg/dLSODIUM 140.0 mmol/
LPOTASSIUM 4.20 mmol/LCHLORIDE 105.0 mmol/LCO2 25.0 mmol/LBUN 24.0 mg/
dLCREATININE 0.90 mg/dLSGOT/AST 23.0 IU/LSGPT/ALT 28.0 IU/LALK PHOS 111.0 IU/
LTOTAL PROTEIN 7.0 g/dLALBUMIN 4.30 g/dLTOTAL BILI 0.60 mg/dLCALCIUM 10.60 mg/
dLAGE 58 GFR NonAA 64 GFR AA 78 eGFR >60 mL/min/1.73 m2eGFR AA* >60 HGB A1C 
6.90 %Est Avg Glucose 151.3 mg/dL

 

 2014 9:05 AM  TRIGLYCERIDES 210.0 mg/dLCHOLESTEROL 204.0 mg/dLHDL 51.0 mg/
dLTOT CHOL/HDL 4.0 LDL (CALC) 111.0 mg/dLGLUCOSE 120.0 mg/dLSODIUM 141.0 mmol/
LPOTASSIUM 4.40 mmol/LCHLORIDE 103.0 mmol/LCO2 25.0 mmol/LBUN 18.0 mg/
dLCREATININE 0.90 mg/dLSGOT/AST 24.0 IU/LSGPT/ALT 29.0 IU/LALK PHOS 98.0 IU/
LTOTAL PROTEIN 7.0 g/dLALBUMIN 4.10 g/dLTOTAL BILI 0.60 mg/dLCALCIUM 10.20 mg/
dLAGE 59 GFR NonAA 64 GFR AA 78 eGFR 60 eGFR AA* 60 HGB A1C 6.80 %Est Avg 
Glucose 148.5 mg/dL

 

 10/24/2016 10:22 AM  HGB A1C 6.50 %Est Avg Glucose 139.9 mg/dLGLUCOSE 129.0 mg/
dLSODIUM 143.0 mmol/LPOTASSIUM 4.40 mmol/LCHLORIDE 104.0 mmol/LCO2 24.0 mmol/
LBUN 25.0 mg/dLCREATININE 0.90 mg/dLSGOT/AST 25.0 IU/LSGPT/ALT 35.0 IU/LALK 
PHOS 96.0 IU/LTOTAL PROTEIN 7.10 g/dLALBUMIN 4.20 g/dLTOTAL BILI 0.50 mg/
dLCALCIUM 10.30 mg/dLAGE 61 GFR NonAA 64 GFR AA 78 eGFR >60 mL/min/1.73meGFR 
AA* >60 TRIGLYCERIDES 181.0 mg/dLCHOLESTEROL 201.0 mg/dLHDL 47.0 mg/dLTOT CHOL/
HDL 4.3 LDL (CALC) 118.0 mg/dLMICROALBUMIN UR <0.5 ug/mLVITAMIN B12 485.0 pg/mL

 

 3/24/2018 1:00 PM  GLUCOSE 123.0 mg/dLSODIUM 140.0 mmol/LPOTASSIUM 4.20 mmol/
LCHLORIDE 104.0 mmol/LCO2 26.0 mmol/LBUN 30.0 mg/dLCREATININE 1.10 mg/dLSGOT/
AST 23.0 IU/LSGPT/ALT 28.0 IU/LALK PHOS 93.0 IU/LTOTAL PROTEIN 7.60 g/dLALBUMIN 
4.30 g/dLTOTAL BILI 0.50 mg/dLCALCIUM 11.20 mg/dLAGE 63 GFR NonAA 50 GFR AA 61 
eGFR 50 eGFR AA* >60 WBC 8.3 RBC 4.33 HGB 13.20 g/dLHCT 40.30 %MCV 93.0 fLMCH 
30.50 pgMCHC 32.80 g/dLRDW SD 44 RDW CV 12.80 %MPV 10.30 fLPLT 300 NRBC# 0.00 
NRBC% 0.0 %NEUT 66.90 %%LYMP 23.50 %%MONO 7.60 %%EOS 1.60 %%BASO 0.20 %#NEUT 
5.54 #LYMP 1.95 #MONO 0.63 #EOS 0.13 #BASO 0.02 MANUAL DIFF NOT IND HGB A1C 
5.80 %Est Avg Glucose 119.8 mg/dLTRIGLYCERIDES 154.0 mg/dLCHOLESTEROL 186.0 mg/
dLHDL 46.0 mg/dLTOT CHOL/HDL 4.0 LDL (CALC) 109.0 mg/dL







History Of Immunizations







 Name  Date Admin  Mfg Name  Mfg Code  Trade Name  Lot#  Route  Inj  Vis Given  
Vis Pub  CVX

 

 Tdap  2012  Y'all  SKB  ADACEL  [id number]  Intramuscular  Right 
Deltoid  2008  115

 

 Influenza  2015  Not Entered  NE  AFLURIA     Not Entered  Not Entered  1  141

 

 Influenza  2016  Not Entered  NE  AFLURIA     Not Entered  Not Entered  1  141







History of Past Illness







 Name  Date of Onset  Comments

 

 Hypertension  Dec  7 2009  1:45PM   

 

 Glucose Intolerance  Dec  7 2009  1:45PM   

 

 Hyperlipidemia  Dec  7 2009  1:45PM   

 

 Screening Mammogram  Dec  7 2009  1:45PM   

 

 Routine gynecological examination  Dec 21 2009  8:25AM   

 

 Hypertension Stable  Dec 21 2009  8:25AM   

 

 Hyperlipidemia, unspecified Stable  Dec 21 2009  8:25AM   

 

 Arthritis unspecified      

 

 Hyperlipidemia      

 

 Hypertension      

 

 Hypertension  Dec 17 2010  1:41PM   

 

 Glucose Intolerance  Dec 17 2010  1:41PM   

 

 Hyperlipidemia  Dec 17 2010  1:41PM   

 

 Hyperglycemia  Dec 17 2010  1:41PM   

 

 Routine gynecological examination  Dec 20 2010  1:52PM   

 

 Overactive bladder  Dec 20 2010  1:52PM   

 

 Hypertension  2011 12:05PM   

 

 Glucose Intolerance  2011 12:05PM   

 

 Hyperlipidemia  2011 12:05PM   

 

 Hyperglycemia  2011 12:05PM   

 

 Routine gynecological examination  Dec  8 2011  8:45AM   

 

 Overactive bladder  Dec  8 2011  8:45AM   

 

 Screening Examination for Breast Cancer  Dec  8 2011  8:45AM   

 

 Hyperglycemia  Dec  8 2011  8:45AM   

 

 Laceration 2nd digit right hand  May 21 2012 10:59AM   

 

 Acute Pharyngitis  Oct  8 2012  8:30AM   

 

 Hypertension  Dec 10 2012 11:59AM   

 

 Glucose Intolerance  Dec 10 2012 11:59AM   

 

 Hyperlipidemia  Dec 10 2012 11:59AM   

 

 Hyperglycemia  Dec 10 2012 11:59AM   

 

 Screening Examination for Breast Cancer  Dec 19 2012  8:02AM   

 

 Hypertension  Dec 19 2012  8:02AM   

 

 Hyperlipidemia, Mixed  Dec 19 2012  8:02AM   

 

 Hyperglycemia  Dec 19 2012  8:02AM   

 

 Upper Respiratory Infections  May 23 2013 10:32AM   

 

 Eczema  Aug 14 2013  9:49AM   

 

 Eustachian Tube Dysfunction, Bilateral  Aug 14 2013  9:49AM   

 

 Worried well  Sep 16 2013  9:56AM   

 

 Hypertension  Dec  2 2013 11:57AM   

 

 Glucose Intolerance  Dec  2 2013 11:57AM   

 

 Hyperlipidemia  Dec  2 2013 11:57AM   

 

 Hyperglycemia  Dec  2 2013 11:57AM   

 

 Routine gynecological examination  Dec 17 2013  8:06AM   

 

 Screening Examination for Breast Cancer  Dec 17 2013  8:06AM   

 

 Hypertension  2014 11:58AM   

 

 Glucose Intolerance  2014 11:58AM   

 

 Hyperlipidemia  2014 11:58AM   

 

 Hyperglycemia  2014 11:58AM   

 

 Essential Hypertension  2014 10:39AM   

 

 Diabetes Mellitus, Type II  2014 10:39AM   

 

 Hyperlipidemia, Mixed  2014 10:39AM   

 

 Sinusitis, Acute  Sep 14 2015  9:52AM   

 

 Bronchitis, Acute  Sep 14 2015  9:52AM   

 

 Arthritis unspecified  Oct 26 2015  8:37AM   

 

 Hypertension  Oct 20 2016  9:16AM   

 

 Diabetes Mellitus, Type II  Oct 20 2016  9:16AM   

 

 Hyperlipidemia  Oct 20 2016  9:16AM   

 

 Routine gynecological examination  2016  8:11AM   

 

 Screening Examination for Breast Cancer  2016  8:11AM   

 

 Visit for screening mammogram  2016  8:11AM   

 

 Hyperlipidemia  2016  8:11AM   

 

 Hypertension  2016  8:11AM   

 

 Controlled diabetes mellitus type II without complication  2016  8:11AM
   

 

 Pain in joint of right shoulder  2017  9:41AM   

 

 Acute bronchitis  Mar 27 2017  9:57AM   

 

 Acute non-recurrent maxillary sinusitis  Mar 27 2017  9:57AM   

 

 Hyperlipidemia  Oct 12 2017  8:08AM   

 

 Morbid Obesity  Oct 12 2017  8:08AM   

 

 Hypertension  Oct 12 2017  8:08AM   

 

 Hypertension  Mar 19 2018 10:08AM   

 

 Hyperlipemia  Mar 19 2018 10:08AM   

 

 Morbid obesity  Mar 19 2018 10:08AM   

 

 Family history of diabetes mellitus  Mar 19 2018 10:08AM   

 

 Hyperlipidemia  2018  8:05AM   

 

 Morbid Obesity  2018  8:05AM   

 

 Hypertension  2018  8:05AM   







Payers







 Insurance Name  Company Name  Plan Name  Plan Number  Policy Number  Policy 
Group Number  Start Date

 

    Medicare RHC  Medicare RHC     [id number]     2008

 

    Medicare Part B  [employer] [place]     [id number]     2008

 

    Medicare Part A  Medicare Part A     [id number]     2008

 

    Medicare Part A  Medicare - Lab/Xray     [id number]     2008







History of Encounters







 Visit Date  Visit Type  Provider

 

 2018  Office visit  Brain Hauser DO

 

 10/12/2017  Office visit  Brain Hauser DO

 

 3/27/2017  Office visit  Brain Hauser DO

 

 2017  Office visit  Brain Hauser DO

 

 2016  Office visit  Brain Hausre DO

 

 10/26/2015  Office visit  Brain Hauser DO

 

 2015  Office visit  Brain Hauser DO

 

 2014  Office visit  Brain Hauser DO

 

 2013  Office visit  Brain Shafferte DO

 

 2013  Office visit  Brain Murtaza DO

 

 2013  Office visit  Brain Hauser DO

 

 2013  Office visit  Margie Zurita APRN

 

 2012  Office visit  Brain Hauser DO

 

 10/8/2012  Office visit  Brain Hauser DO

 

 2012  Office visit  Brain Hauser DO

 

 2011  Office visit  Brain Hauser DO

 

 2010  Office visit  Brain Shafferte DO

 

 2009  Office visit  Brain Murtaza DO

 

 2009  Office visit  Brain Hauser DO

## 2019-02-11 NOTE — DIAGNOSTIC IMAGING REPORT
PROCEDURE: CT abdomen and pelvis with contrast.



TECHNIQUE: Multiple contiguous axial images were obtained through

the abdomen and pelvis after administration of intravenous

contrast. 



DATE: February 11, 2019.



COMPARISON: Abdominal radiographs February 11, 2019. 



INDICATION: 63-year-old female, right lower quadrant abdominal

pain and nausea.



FINDINGS: 

The visualized portions of the lung bases are grossly clear. The

heart is not enlarged. There is no identified pericardial

effusion.



The liver is normal in size and contour. There is no identified

liver lesion. The main, right, and left portal veins are patent.

The gallbladder is surgically absent. There is no intrahepatic or

extrahepatic bile duct dilation. The main pancreatic duct is not

abnormally dilated. There is fatty replacement of the pancreatic

parenchyma. The spleen is not enlarged. There is a nonspecific

low-attenuation lesion in the spleen on axial image 28 which

measures 8 mm in size. The adrenal glands are unremarkable.



Unremarkable appearance of the renal parenchyma. The urinary

collecting systems are not distended. There is no identified

renal or ureteral stone. The urinary bladder is under distended

and grossly unremarkable in appearance.



There is diverticulosis without evidence of acute diverticulitis.

The intestinal tract is not distended. The appendix is best seen

on axial image 79 and adjacent sequential images. The appendix is

normal. There is no identified free intraperitoneal air. There is

no focal fluid collection. There is no free pelvic fluid.



There are atherosclerotic calcifications. There is no identified

abnormally enlarged lymph node in the abdomen or pelvis which

meets CT size criteria for adenopathy.



There are arthritic changes of both hips. There are advanced

multilevel degenerative changes of the spine. There is grade 1-2

anterolisthesis of L5 on S1 relating to bilateral L5 pars

interarticularis defects. There is no identified acute bony

abnormality.



IMPRESSION: 

CT ABDOMEN AND PELVIS.

1. No evidence of acute appendicitis or other acute abnormality

in the abdomen or pelvis.









Dictated by: 



  Dictated on workstation # [id number]

## 2019-02-11 NOTE — XMS REPORT
MU2 Ambulatory Summary

 Created on: 2015



Elena Deluca

External Reference #: [id number]

: 1955

Sex: Female



Demographics







 Address  [address]

[place]  [address]

 

 Home Phone  [phone]

 

 Preferred Language  English

 

 Marital Status  

 

 Caodaism Affiliation  Unknown

 

 Race  White

 

 Ethnic Group  Not  or 





Author







 Author  Brain Hauser

 

 [facility]

 

 Address  [address]

[place], [place]  [id number]



 

 Phone  [phone]







Care Team Providers







 Care Team Member Name  Role  Phone

 

 Brain Hauser  PCP  Unavailable







Allergies and Adverse Reactions







 Name  Reaction  Notes

 

 NO KNOWN DRUG ALLERGIES      







Plan of Treatment

Not available.



Medications







 Active 

 

 Name  Start Date  Estimated Completion Date  SIG  Comments

 

 Calcium 600mg        one tablet daily   

 

 Multivitamin Oral Tablet        take 1 tablet by oral route daily   

 

 aspirin 81 mg oral tablet        take 1 tablet (81 mg) by oral route once 
daily   

 

 Fish Oil 1,000 mg oral capsule  2010     take 2 capsules by oral route 
daily   

 

 valsartan-hydrochlorothiazide 160-12.5 mg oral tablet  2014     TAKE ONE 
TABLET BY MOUTH EVERY DAY   

 

 simvastatin 20 mg oral tablet  2014     TAKE ONE TABLET BY MOUTH EVERY 
DAY IN THE EVENING   

 

 amlodipine 10 mg oral tablet  2014  TAKE ONE TABLET BY MOUTH 
EVERY DAY   

 

 Osteo Bi-Flex 250-200 mg oral tablet        take 1 tablet by oral route daily 
  

 

 simvastatin 20 mg oral tablet  2014     TAKE ONE TABLET BY MOUTH EVERY 
DAY IN THE EVENING   

 

 valsartan-hydrochlorothiazide 160-12.5 mg oral tablet  2015     TAKE ONE 
TABLET BY MOUTH EVERY DAY   

 

 simvastatin 20 mg oral tablet  2015     TAKE ONE TABLET BY MOUTH ONCE 
DAILY AT BEDTIME   

 

 valsartan-hydrochlorothiazide 160-12.5 mg oral tablet  2015     TAKE ONE 
TABLET BY MOUTH ONCE DAILY   

 

 valsartan-hydrochlorothiazide 160-12.5 mg oral tablet  8/3/2015     TAKE ONE 
TABLET BY MOUTH ONCE DAILY   

 

 simvastatin 20 mg oral tablet  2015     TAKE ONE TABLET BY MOUTH ONCE 
DAILY AT BEDTIME   

 

 Augmentin 500-125 mg oral tablet  2015  take 1 tablet by oral 
route every 12 hours for 7 days   









  

 

 Name  Start Date  Expiration Date  SIG  Comments

 

 Augmentin 500-125 mg oral tablet  10/8/2012  10/15/2012  take 1 tablet by oral 
route every 12 hours for 7 days   

 

 Diflucan 100 mg oral tablet  10/8/2012  10/11/2012  take 1 tablet (100 mg) by 
oral route once daily   

 

 amoxicillin 500 mg oral capsule  2013  take 1 capsule (500 mg) 
by oral route 3 times per day for 10 days   

 

 simvastatin 20 mg oral tablet  9/10/2013  10/10/2013  TAKE ONE TABLET BY MOUTH 
EVERY DAY IN THE EVENING   









 Discontinued 

 

 Name  Start Date  Discontinued Date  SIG  Comments

 

 niacin 500 mg oral tablet extended release 24 hr     2011  take 1 tablets 
(500 mg) by oral route once daily  Starting on Simvastatin

 

 Vesicare 5 mg oral tablet  2011  take 1 tablet by oral route 
daily for 30 days   

 

 Osteo Bi-Flex (5-Loxin) 1,500-400-100 mg-unit-mg oral tablet     2013  
take 1 tablet by oral route daily   

 

 Diovan -12.5 mg oral tablet  2012  TAKE ONE TABLET BY 
MOUTH EVERY DAY  generic on list

 

 Medrol (Lei) 4 mg oral tablets,dose pack  2013  take as 
directed   

 

 nystatin-triamcinolone 100,000-0.1 unit/g-% topical cream  2013  apply to affected area(s) by topical route 2 times a day   

 

 fexofenadine 180 mg oral tablet  2013  take 1 tablet (180 mg) 
by oral route once daily   







Problem List







 Description  Status  Onset

 

 Arthritis unspecified  Active   

 

 Hyperlipidemia  Active   

 

 Hypertension  Active   







Vital Signs







 Date  Time  BP-Sys(mm[Hg]  BP-Edelmira(mm[Hg])  HR(bpm)  RR(rpm)  Temp  WT  HT  HC  
BMI  BSA  BMI Percentile  O2 Sat(%)

 

 2015  9:50:00 AM  132 mmHg  88 mmHg  90 bpm  22 rpm  98.5 F  387 lbs  64 
in     66.43 kg/m2  2.82 m2     97 %

 

 2014  10:36:00 AM  142 mmHg  80 mmHg  78 bpm  22 rpm  97.9 F  381 lbs  64 
in     65.3978 kg/m  2.7935 m     97 %

 

 2013  8:04:00 AM  138 mmHg  70 mmHg  88 bpm  22 rpm  98.1 F  388 lbs  64 
in     66.60 kg/m2  2.82 m2     98 %

 

 2013  9:54:00 AM  136 mmHg  70 mmHg  88 bpm  24 rpm  97.1 F  386 lbs  64 
in     66.2561 kg/m  2.8118 m     97 %

 

 2013  9:47:00 AM  134 mmHg  78 mmHg  70 bpm  16 rpm  97.8 F  381 lbs  64 
in     65.40 kg/m2  2.79 m2      

 

 2013  10:27:00 AM  136 mmHg  84 mmHg  82 bpm  22 rpm  98.2 F  384.2 lbs  
64 in     65.9471 kg/m  2.8052 m     96 %

 

 2012  7:59:00 AM  138 mmHg  72 mmHg  70 bpm  18 rpm  98 F  377 lbs  64 
in     64.71 kg/m2  2.78 m2      

 

 10/8/2012  8:29:00 AM  138 mmHg  70 mmHg  72 bpm  18 rpm  98.1 F  371 lbs  64 
in     63.6814 kg/m  2.7566 m      

 

 2012  10:57:00 AM  136 mmHg  72 mmHg  68 bpm  18 rpm  98 F  371 lbs  64 
in     63.68 kg/m2  2.76 m2      

 

 2011  8:43:00 AM  144 mmHg  78 mmHg  72 bpm  18 rpm  98.2 F  396 lbs  64 
in     67.9725 kg/m  2.848 m      

 

 2010  1:51:00 PM  142 mmHg  80 mmHg  76 bpm  16 rpm  98.7 F  382 lbs  64 
in     65.57 kg/m2  2.80 m2      

 

 2009  8:23:00 AM  132 mmHg  78 mmHg  70 bpm  24 rpm  98.3 F  377 lbs  64 
in     64.7112 kg/m  2.7788 m      







Social History







 Name  Description  Comments

 

 Tobacco  Former smoker  for 2 years

 

       







History of Procedures







 Date Ordered  Description  Order Status

 

 2011 12:00 AM  COMPREHEN METABOLIC PANEL  Reviewed

 

 2011 12:00 AM  LIPID PANEL  Reviewed

 

 2011 12:00 AM  GLYCOSYLATED HEMOGLOBIN TEST  Reviewed

 

 2011 12:00 AM  CYTOPATH C/V THIN LAYER  Reviewed

 

 2011 12:00 AM  MAMMOGRAM SCREENING  Reviewed

 

 2012 12:00 AM  ASSAY GLUCOSE BLOOD QUANT  Reviewed

 

 2012 12:00 AM  TDAP VACCINE 7 YRS/> IM  Reviewed

 

 2009 12:00 AM  COMPREHEN METABOLIC PANEL  Reviewed

 

 2009 12:00 AM  LIPID PANEL  Reviewed

 

 2009 12:00 AM  GLYCOSYLATED HEMOGLOBIN TEST  Reviewed

 

 2009 12:00 AM  MAMMOGRAM SCREENING  Reviewed

 

 12/10/2012 12:00 AM  COMPREHEN METABOLIC PANEL  Reviewed

 

 12/10/2012 12:00 AM  LIPID PANEL  Reviewed

 

 12/10/2012 12:00 AM  GLYCOSYLATED HEMOGLOBIN TEST  Reviewed

 

 2012 12:00 AM  MAMMOGRAM SCREENING  Reviewed

 

 2012 12:00 AM  ASSAY GLUCOSE BLOOD QUANT  Reviewed

 

 2013 12:00 AM  COMPREHEN METABOLIC PANEL  Reviewed

 

 2013 12:00 AM  LIPID PANEL  Reviewed

 

 2013 12:00 AM  GLYCOSYLATED HEMOGLOBIN TEST  Reviewed

 

 2013 12:00 AM  CYTOPATH C/V THIN LAYER  Reviewed

 

 2013 12:00 AM  MAMMOGRAM SCREENING  Reviewed

 

 2009 12:00 AM  CYTOPATH C/V THIN LAYER  Reviewed

 

 2010 12:00 AM  COMPREHEN METABOLIC PANEL  Reviewed

 

 2010 12:00 AM  LIPID PANEL  Reviewed

 

 2010 12:00 AM  GLYCOSYLATED HEMOGLOBIN TEST  Reviewed

 

 2010 12:00 AM  CYTOPATH C/V THIN LAYER  Reviewed

 

 2010 12:00 AM  MAMMOGRAM SCREENING  Reviewed

 

 2014 12:00 AM  COMPREHEN METABOLIC PANEL  Reviewed

 

 2014 12:00 AM  LIPID PANEL  Reviewed

 

 2014 12:00 AM  GLYCOSYLATED HEMOGLOBIN TEST  Reviewed







Results Summary







 Data and Description  Results

 

 2009 10:01 AM  .0 mg/dLCHOLESTEROL 258 mg/dLTRIGLYCERIDES 252.0 mg
/dLHDL 51.0 mg/dLLDL 153.0 mg/dLTRIGLYCERIDES 252.0 mg/dLHDL 51.0 mg/
dLCHOLESTEROL 258 mg/dLGLYCOHEMOGLOBIN A1C 6.3 GLYCOHEMOGLOBIN A1C 6.3 GLUCOSE 
116 SODIUM 144.0 mmol/LPOTASSIUM 4.80 mmol/LCHLORIDE 105.0 mmol/LCO2 24.0 mmol/
LBUN 18.0 mg/dLCREATININE 0.80 mg/dLSGOT/AST 23.0 IU/LSGPT/ALT 26.0 IU/LALK 
PHOS 98.0 IU/LTOTAL PROTEIN 7.30 g/dLALBUMIN 4.20 g/dLTOTAL BILI 0.40 mg/
dLCALCIUM 10.30 mg/dLeGFR >60 mL/minGLUCOSE 116 SODIUM 144.0 mmol/LPOTASSIUM 
4.80 mmol/LCHLORIDE 105.0 mmol/LCO2 24.0 mmol/LBUN 18.0 mg/dLCREATININE 0.80 mg/
dLSGOT/AST 23.0 IU/LSGPT/ALT 26.0 IU/LALK PHOS 98.0 IU/LTOTAL PROTEIN 7.30 g/
dLALBUMIN 4.20 g/dLTOTAL BILI 0.40 mg/dLCALCIUM 10.30 mg/dLeGFR >60 mL/min

 

 2011 9:00 AM  GLUCOSE 130.0 mg/dLSODIUM 139.0 mmol/LPOTASSIUM 4.40 mmol/
LCHLORIDE 104.0 mmol/LCO2 26.0 mmol/LBUN 21.0 mg/dLCREATININE 0.80 mg/dLSGOT/
AST 19.0 IU/LSGPT/ALT 25.0 IU/LALK PHOS 108.0 IU/LTOTAL PROTEIN 7.10 g/
dLALBUMIN 4.20 g/dLTOTAL BILI 0.40 mg/dLCALCIUM 10.20 mg/dLeGFR >60 mL/min/1.73 
[id number] 227.0 mg/dLCHOLESTEROL 282.0 mg/dLHDL 45.0 mg/dLLDL (CALC) 
192.0 mg/dLGLYCOHEMOGLOBIN A1C 6.10 %

 

 2012 9:42 AM  GLUCOSE 131.0 mg/dL

 

 2012 10:02 AM  GLUCOSE 150.0 mg/dLSODIUM 140.0 mmol/LPOTASSIUM 4.60 mmol/
LCHLORIDE 104.0 mmol/LCO2 25.0 mmol/LBUN 21.0 mg/dLCREATININE 0.90 mg/dLSGOT/
AST 19.0 IU/LSGPT/ALT 26.0 IU/LALK PHOS 99.0 IU/LTOTAL PROTEIN 7.10 g/dLALBUMIN 
4.20 g/dLTOTAL BILI 0.50 mg/dLCALCIUM 10.80 mg/dLeGFR 60 TRIGLYCERIDES 210.0 mg/
dLCHOLESTEROL 241.0 mg/dLHDL 48.0 mg/dLLDL (CALC) 151.0 mg/dLGLYCOHEMOGLOBIN 
A1C 6.30 %

 

 2012 8:54 AM  GLUCOSE 125.0 mg/dL

 

 12/3/2013 8:58 AM  TRIGLYCERIDES 210.0 mg/dLCHOLESTEROL 211.0 mg/dLHDL 49.0 mg/
dLLDL (CALC) 120.0 mg/dLGLUCOSE 125.0 mg/dLSODIUM 140.0 mmol/LPOTASSIUM 4.20 
mmol/LCHLORIDE 105.0 mmol/LCO2 25.0 mmol/LBUN 24.0 mg/dLCREATININE 0.90 mg/
dLSGOT/AST 23.0 IU/LSGPT/ALT 28.0 IU/LALK PHOS 111.0 IU/LTOTAL PROTEIN 7.0 g/
dLALBUMIN 4.30 g/dLTOTAL BILI 0.60 mg/dLCALCIUM 10.60 mg/dLeGFR >60 mL/min/1.73 
m2HGB A1C 6.90 %Est Avg Glucose 151.3 mg/dL

 

 2014 9:05 AM  TRIGLYCERIDES 210.0 mg/dLCHOLESTEROL 204.0 mg/dLHDL 51.0 mg/
dLLDL (CALC) 111.0 mg/dLGLUCOSE 120.0 mg/dLSODIUM 141.0 mmol/LPOTASSIUM 4.40 
mmol/LCHLORIDE 103.0 mmol/LCO2 25.0 mmol/LBUN 18.0 mg/dLCREATININE 0.90 mg/
dLSGOT/AST 24.0 IU/LSGPT/ALT 29.0 IU/LALK PHOS 98.0 IU/LTOTAL PROTEIN 7.0 g/
dLALBUMIN 4.10 g/dLTOTAL BILI 0.60 mg/dLCALCIUM 10.20 mg/dLeGFR 60 HGB A1C 6.80 
%Est Avg Glucose 148.5 mg/dL







History Of Immunizations







 Name  Date Admin  Mfg Name  Mfg Code  Trade Name  Lot#  Route  Inj  Vis Given  
Vis Pub  CVX

 

 Tdap  2012  [employer]  SKB  ADACEL  [id number]  Intramuscular  Right 
Deltoid  2008  115







History of Past Illness







 Name  Date of Onset  Comments

 

 Hypertension  Dec  7 2009  1:45PM   

 

 Glucose Intolerance  Dec  7 2009  1:45PM   

 

 Hyperlipidemia  Dec  7 2009  1:45PM   

 

 Screening Mammogram  Dec  7 2009  1:45PM   

 

 Routine gynecological examination  Dec 21 2009  8:25AM   

 

 Hypertension Stable  Dec 21 2009  8:25AM   

 

 Hyperlipidemia, unspecified Stable  Dec 21 2009  8:25AM   

 

 Arthritis unspecified      

 

 Hyperlipidemia      

 

 Hypertension      

 

 Hypertension  Dec 17 2010  1:41PM   

 

 Glucose Intolerance  Dec 17 2010  1:41PM   

 

 Hyperlipidemia  Dec 17 2010  1:41PM   

 

 Hyperglycemia  Dec 17 2010  1:41PM   

 

 Routine gynecological examination  Dec 20 2010  1:52PM   

 

 Overactive bladder  Dec 20 2010  1:52PM   

 

 Hypertension  2011 12:05PM   

 

 Glucose Intolerance  2011 12:05PM   

 

 Hyperlipidemia  2011 12:05PM   

 

 Hyperglycemia  2011 12:05PM   

 

 Routine gynecological examination  Dec  8 2011  8:45AM   

 

 Overactive bladder  Dec  8 2011  8:45AM   

 

 Screening Examination for Breast Cancer  Dec  8 2011  8:45AM   

 

 Hyperglycemia  Dec  8 2011  8:45AM   

 

 Laceration 2nd digit right hand  May 21 2012 10:59AM   

 

 Acute Pharyngitis  Oct  8 2012  8:30AM   

 

 Hypertension  Dec 10 2012 11:59AM   

 

 Glucose Intolerance  Dec 10 2012 11:59AM   

 

 Hyperlipidemia  Dec 10 2012 11:59AM   

 

 Hyperglycemia  Dec 10 2012 11:59AM   

 

 Screening Examination for Breast Cancer  Dec 19 2012  8:02AM   

 

 Hypertension  Dec 19 2012  8:02AM   

 

 Hyperlipidemia, Mixed  Dec 19 2012  8:02AM   

 

 Hyperglycemia  Dec 19 2012  8:02AM   

 

 Upper Respiratory Infections  May 23 2013 10:32AM   

 

 Eczema  Aug 14 2013  9:49AM   

 

 Eustachian Tube Dysfunction, Bilateral  Aug 14 2013  9:49AM   

 

 Worried well  Sep 16 2013  9:56AM   

 

 Hypertension  Dec  2 2013 11:57AM   

 

 Glucose Intolerance  Dec  2 2013 11:57AM   

 

 Hyperlipidemia  Dec  2 2013 11:57AM   

 

 Hyperglycemia  Dec  2 2013 11:57AM   

 

 Routine gynecological examination  Dec 17 2013  8:06AM   

 

 Screening Examination for Breast Cancer  Dec 17 2013  8:06AM   

 

 Hypertension  2014 11:58AM   

 

 Glucose Intolerance  2014 11:58AM   

 

 Hyperlipidemia  2014 11:58AM   

 

 Hyperglycemia  2014 11:58AM   

 

 Essential Hypertension  2014 10:39AM   

 

 Diabetes Mellitus, Type II  2014 10:39AM   

 

 Hyperlipidemia, Mixed  2014 10:39AM   

 

 Sinusitis, Acute  Sep 14 2015  9:52AM   

 

 Bronchitis, Acute  Sep 14 2015  9:52AM   







Payers







 Insurance Name  Company Name  Plan Name  Plan Number  Policy Number  Policy 
Group Number  Start Date

 

    Medicare Part A  Medicare Part A     [id number]     2008

 

    Medicare Part B  [employer] [place]     [id number]     2008







History of Encounters







 Visit Date  Visit Type  Provider

 

 2015  Office visit  Brain Hauser DO

 

 2014  Office visit  Brain Hauser DO

 

 2013  Office visit  Brain Hauser DO

 

 2013  Office visit  Brain Hauser DO

 

 2013  Office visit  Brain Hauser DO

 

 2013  Office visit  Margie RODAS

 

 2012  Office visit  Brain Hauser DO

 

 10/8/2012  Office visit  Brain Hauser DO

 

 2012  Office visit  Brain Hauser DO

 

 2011  Office visit  Brain Hauser DO

 

 2010  Office visit  Brain Hauser DO

 

 2009  Office visit  Brain Hauser DO

 

 2009  Office visit  Brain Hauser DO

## 2019-02-11 NOTE — XMS REPORT
MU2 Ambulatory Summary

 Created on: 2018



Elena Deluca

External Reference #: [id number]

: 1955

Sex: Female



Demographics







 Address  [address] [place]  [address]

 

 Home Phone  [phone]

 

 Preferred Language  English

 

 Marital Status  

 

 Protestant Affiliation  Unknown

 

 Race  White

 

 Ethnic Group  Not  or 





Author







 Author  Brain Hauser

 

 [facility]

 

 Address  [address]

[place], [place]  [id number]



 

 Phone  [phone]







Care Team Providers







 Care Team Member Name  Role  Phone

 

 Brain Hauser  PCP  [phone]

 

 Brain Hauser  PreferredProvider  [phone]







Allergies and Adverse Reactions







 Name  Reaction  Notes

 

 NO KNOWN DRUG ALLERGIES      







Plan of Treatment







 Planned Activity  Comments  Planned Date  Planned Time  Plan/Goal

 

 CBC W/ AUTO DIFF (RFLX MAN DIFF IF IND).     3/19/2018  12:00 AM   

 

 CMP     3/19/2018  12:00 AM   

 

 LIPID PANEL     3/19/2018  12:00 AM   

 

 HEMOGLOBIN A1C     3/19/2018  12:00 AM   

 

 Discuss Bariatric Surgery options. She needs to lose 100 lbs in order to have 
Left Total Knee Replacement.            







Medications







 Active 

 

 Name  Start Date  Estimated Completion Date  SIG  Comments

 

 Calcium 600mg        one tablet daily   

 

 Multivitamin Oral Tablet        take 1 tablet by oral route daily   

 

 aspirin 81 mg oral tablet        take 1 tablet (81 mg) by oral route once 
daily   

 

 Fish Oil 1,000 mg oral capsule  2010     take 2 capsules by oral route 
daily   

 

 valsartan-hydrochlorothiazide 160-12.5 mg oral tablet  2014     TAKE ONE 
TABLET BY MOUTH EVERY DAY   

 

 simvastatin 20 mg oral tablet  2014     TAKE ONE TABLET BY MOUTH EVERY 
DAY IN THE EVENING   

 

 Osteo Bi-Flex 250-200 mg oral tablet        take 1 tablet by oral route daily 
  

 

 simvastatin 20 mg oral tablet  2014     TAKE ONE TABLET BY MOUTH EVERY 
DAY IN THE EVENING   

 

 valsartan-hydrochlorothiazide 160-12.5 mg oral tablet  2015     TAKE ONE 
TABLET BY MOUTH EVERY DAY   

 

 simvastatin 20 mg oral tablet  2015     TAKE ONE TABLET BY MOUTH ONCE 
DAILY AT BEDTIME   

 

 valsartan-hydrochlorothiazide 160-12.5 mg oral tablet  2015     TAKE ONE 
TABLET BY MOUTH ONCE DAILY   

 

 valsartan-hydrochlorothiazide 160-12.5 mg oral tablet  8/3/2015     TAKE ONE 
TABLET BY MOUTH ONCE DAILY   

 

 simvastatin 20 mg oral tablet  2015     TAKE ONE TABLET BY MOUTH ONCE 
DAILY AT BEDTIME   

 

 amlodipine 10 mg oral tablet  10/18/2015     TAKE ONE TABLET BY MOUTH ONCE 
DAILY   

 

 valsartan-hydrochlorothiazide 160-12.5 mg oral tablet  2016     TAKE ONE 
TABLET BY MOUTH ONCE DAILY   

 

 simvastatin 20 mg oral tablet  2016     TAKE ONE TABLET BY MOUTH ONCE 
DAILY AT BEDTIME   

 

 valsartan-hydrochlorothiazide 160-12.5 mg oral tablet  2016     TAKE ONE 
TABLET BY MOUTH ONCE DAILY   

 

 simvastatin 20 mg oral tablet  2016     TAKE ONE TABLET BY MOUTH ONCE 
DAILY AT BEDTIME   

 

 amlodipine 10 mg oral tablet  10/9/2016     TAKE ONE TABLET BY MOUTH ONCE 
DAILY   

 

 amlodipine 10 mg oral tablet  2017     TAKE ONE TABLET BY MOUTH ONCE DAILY
   

 

 valsartan-hydrochlorothiazide 160-12.5 mg oral tablet  2017     TAKE ONE 
TABLET BY MOUTH ONCE DAILY   

 

 amlodipine 10 mg oral tablet  10/8/2017  2018  TAKE ONE TABLET BY MOUTH 
ONCE DAILY   

 

 cyclobenzaprine 10 mg oral tablet  10/12/2017     take 1 tablet (10 mg) by 
oral route at bedtime   









  

 

 Name  Start Date  Expiration Date  SIG  Comments

 

 Augmentin 500-125 mg oral tablet  10/8/2012  10/15/2012  take 1 tablet by oral 
route every 12 hours for 7 days   

 

 Diflucan 100 mg oral tablet  10/8/2012  10/11/2012  take 1 tablet (100 mg) by 
oral route once daily   

 

 amoxicillin 500 mg oral capsule  2013  take 1 capsule (500 mg) 
by oral route 3 times per day for 10 days   

 

 simvastatin 20 mg oral tablet  9/10/2013  10/10/2013  TAKE ONE TABLET BY MOUTH 
EVERY DAY IN THE EVENING   

 

 amlodipine 10 mg oral tablet  2014  TAKE ONE TABLET BY MOUTH 
EVERY DAY   

 

 Augmentin 500-125 mg oral tablet  2015  take 1 tablet by oral 
route every 12 hours for 7 days   

 

 clindamycin HCl 300 mg oral capsule  3/27/2017  4/3/2017  take 1 capsule (300 
mg) by oral route 2 times per day for 7 days   

 

 simvastatin 20 mg oral tablet  2017  TAKE ONE TABLET BY 
MOUTH ONCE DAILY AT BEDTIME   

 

 valsartan-hydrochlorothiazide 160-12.5 mg oral tablet  2018  
TAKE ONE TABLET BY MOUTH ONCE DAILY   









 Discontinued 

 

 Name  Start Date  Discontinued Date  SIG  Comments

 

 niacin 500 mg oral tablet extended release 24 hr     2011  take 1 tablets 
(500 mg) by oral route once daily  Starting on Simvastatin

 

 Vesicare 5 mg oral tablet  2011  take 1 tablet by oral route 
daily for 30 days   

 

 Osteo Bi-Flex (5-Loxin) 1,500-400-100 mg-unit-mg oral tablet     2013  
take 1 tablet by oral route daily   

 

 Diovan -12.5 mg oral tablet  2012  TAKE ONE TABLET BY 
MOUTH EVERY DAY  generic on list

 

 Medrol (Lei) 4 mg oral tablets,dose pack  2013  take as 
directed   

 

 nystatin-triamcinolone 100,000-0.1 unit/g-% topical cream  2013  apply to affected area(s) by topical route 2 times a day   

 

 fexofenadine 180 mg oral tablet  2013  take 1 tablet (180 mg) 
by oral route once daily   

 

 prednisone 20 mg oral tablet  10/26/2015  2016  take 3 tablets (60 mg) by 
oral route once daily for 3 days then 2 tablets (40 mg) daily for 2 days   

 

 cyclobenzaprine 10 mg oral tablet  10/30/2015  2016  take 1 tablet (10 mg
) by oral route 3 times per day   

 

 promethazine-codeine 6.25-10 mg/5 mL oral syrup  3/27/2017  10/12/2017  take 5 
milliliters by oral route every 6 hours as needed, not to exceed 30 mL in 24 
hours   







Problem List







 Description  Status  Onset

 

 Arthritis unspecified  Active   

 

 Hyperlipidemia  Active   

 

 Hypertension  Active   







Vital Signs







 Date  Time  BP-Sys(mm[Hg]  BP-Edelmira(mm[Hg])  HR(bpm)  RR(rpm)  Temp  WT  HT  HC  
BMI  BSA  BMI Percentile  O2 Sat(%)

 

 10/12/2017  8:06:00 AM  144 mmHg  82 mmHg  77 bpm  16 rpm  97.7 F  398 lbs  64 
in     68.32 kg/m2  2.86 m2     97 %

 

 3/27/2017  9:55:00 AM  127 mmHg  72 mmHg  76 bpm  22 rpm  98.1 F  393.5 lbs  
63 in     69.7047 kg/m  2.8167 m     97 %

 

 2017  9:36:00 AM  132 mmHg  72 mmHg  76 bpm  20 rpm  97.2 F  396.25 lbs  
64 in     68.02 kg/m2  2.85 m2     97 %

 

 2016  7:58:00 AM  136 mmHg  70 mmHg  85 bpm  20 rpm  98.1 F  391 lbs  64 
in     67.1143 kg/m  2.8299 m     95 %

 

 10/26/2015  8:35:00 AM  124 mmHg  70 mmHg  80 bpm  18 rpm  97.1 F     64 in   
            

 

 2015  9:50:00 AM  132 mmHg  88 mmHg  90 bpm  22 rpm  98.5 F  387 lbs  64 
in     66.4277 kg/m  2.8154 m     97 %

 

 2014  10:36:00 AM  142 mmHg  80 mmHg  78 bpm  22 rpm  97.9 F  381 lbs  64 
in     65.40 kg/m2  2.79 m2     97 %

 

 2013  8:04:00 AM  138 mmHg  70 mmHg  88 bpm  22 rpm  98.1 F  388 lbs  64 
in     66.5994 kg/m  2.8191 m     98 %

 

 2013  9:54:00 AM  136 mmHg  70 mmHg  88 bpm  24 rpm  97.1 F  386 lbs  64 
in     66.26 kg/m2  2.81 m2     97 %

 

 2013  9:47:00 AM  134 mmHg  78 mmHg  70 bpm  16 rpm  97.8 F  381 lbs  64 
in     65.3978 kg/m  2.7935 m      

 

 2013  10:27:00 AM  136 mmHg  84 mmHg  82 bpm  22 rpm  98.2 F  384.2 lbs  
64 in     65.95 kg/m2  2.81 m2     96 %

 

 2012  7:59:00 AM  138 mmHg  72 mmHg  70 bpm  18 rpm  98 F  377 lbs  64 
in     64.7112 kg/m  2.7788 m      

 

 10/8/2012  8:29:00 AM  138 mmHg  70 mmHg  72 bpm  18 rpm  98.1 F  371 lbs  64 
in     63.68 kg/m2  2.76 m2      

 

 2012  10:57:00 AM  136 mmHg  72 mmHg  68 bpm  18 rpm  98 F  371 lbs  64 
in     63.6814 kg/m  2.7566 m      

 

 2011  8:43:00 AM  144 mmHg  78 mmHg  72 bpm  18 rpm  98.2 F  396 lbs  64 
in     67.97 kg/m2  2.85 m2      

 

 2010  1:51:00 PM  142 mmHg  80 mmHg  76 bpm  16 rpm  98.7 F  382 lbs  64 
in     65.5695 kg/m  2.7972 m      

 

 2009  8:23:00 AM  132 mmHg  78 mmHg  70 bpm  24 rpm  98.3 F  377 lbs  64 
in     64.71 kg/m2  2.78 m2      







Social History







 Name  Description  Comments

 

 Tobacco  Former smoker  for 2 years

 

       







History of Procedures







 Date Ordered  Description  Order Status

 

 10/26/2015 12:00 AM  X-RAY EXAM OF HIP  Reviewed

 

 10/26/2015 12:00 AM  X-RAY EXAM OF KNEE 3  Reviewed

 

 2011 12:00 AM  COMPREHEN METABOLIC PANEL  Reviewed

 

 2011 12:00 AM  LIPID PANEL  Reviewed

 

 2011 12:00 AM  GLYCOSYLATED HEMOGLOBIN TEST  Reviewed

 

 2011 12:00 AM  CYTOPATH C/V THIN LAYER  Reviewed

 

 2011 12:00 AM  MAMMOGRAM SCREENING  Reviewed

 

 2012 12:00 AM  ASSAY GLUCOSE BLOOD QUANT  Reviewed

 

 10/20/2016 12:00 AM  COMPREHEN METABOLIC PANEL  Reviewed

 

 10/20/2016 12:00 AM  LIPID PANEL  Reviewed

 

 10/20/2016 12:00 AM  GLYCOSYLATED HEMOGLOBIN TEST  Reviewed

 

 10/20/2016 12:00 AM  MICROALBUMIN QUANTITATIVE  Reviewed

 

 10/20/2016 12:00 AM  VITAMIN B-12  Reviewed

 

 2017 12:00 AM  RADEX SHOULDER COMPLETE MINIMUM 2 VIEWS  Reviewed

 

 2017 12:00 AM  RADEX HUMERUS MINIMUM 2 VIEWS  Reviewed

 

 2017 12:00 AM  Decadron 8mg Injection  Reviewed

 

 2017 12:00 AM  Depo-Medrol 80mg Injection  Reviewed

 

 2012 12:00 AM  TDAP VACCINE 7 YRS/> IM  Reviewed

 

 2009 12:00 AM  COMPREHEN METABOLIC PANEL  Reviewed

 

 2009 12:00 AM  LIPID PANEL  Reviewed

 

 2009 12:00 AM  GLYCOSYLATED HEMOGLOBIN TEST  Reviewed

 

 2009 12:00 AM  MAMMOGRAM SCREENING  Reviewed

 

 12/10/2012 12:00 AM  COMPREHEN METABOLIC PANEL  Reviewed

 

 12/10/2012 12:00 AM  LIPID PANEL  Reviewed

 

 12/10/2012 12:00 AM  GLYCOSYLATED HEMOGLOBIN TEST  Reviewed

 

 2012 12:00 AM  MAMMOGRAM SCREENING  Reviewed

 

 2012 12:00 AM  ASSAY GLUCOSE BLOOD QUANT  Reviewed

 

 2013 12:00 AM  COMPREHEN METABOLIC PANEL  Reviewed

 

 2013 12:00 AM  LIPID PANEL  Reviewed

 

 2013 12:00 AM  GLYCOSYLATED HEMOGLOBIN TEST  Reviewed

 

 2013 12:00 AM  CYTOPATH C/V THIN LAYER  Reviewed

 

 2013 12:00 AM  MAMMOGRAM SCREENING  Reviewed

 

 2009 12:00 AM  CYTOPATH C/V THIN LAYER  Reviewed

 

 2010 12:00 AM  COMPREHEN METABOLIC PANEL  Reviewed

 

 2010 12:00 AM  LIPID PANEL  Reviewed

 

 2010 12:00 AM  GLYCOSYLATED HEMOGLOBIN TEST  Reviewed

 

 2010 12:00 AM  CYTOPATH C/V THIN LAYER  Reviewed

 

 2010 12:00 AM  MAMMOGRAM SCREENING  Reviewed

 

 2014 12:00 AM  COMPREHEN METABOLIC PANEL  Reviewed

 

 2014 12:00 AM  LIPID PANEL  Reviewed

 

 2014 12:00 AM  GLYCOSYLATED HEMOGLOBIN TEST  Reviewed







Results Summary







 Date and Description  Results

 

 2011 9:00 AM  GLUCOSE 130.0 mg/dLSODIUM 139.0 mmol/LPOTASSIUM 4.40 mmol/
LCHLORIDE 104.0 mmol/LCO2 26.0 mmol/LBUN 21.0 mg/dLCREATININE 0.80 mg/dLSGOT/
AST 19.0 IU/LSGPT/ALT 25.0 IU/LALK PHOS 108.0 IU/LTOTAL PROTEIN 7.10 g/
dLALBUMIN 4.20 g/dLTOTAL BILI 0.40 mg/dLCALCIUM 10.20 mg/dLAGE 56 GFR NonAA 74 
GFR AA 90 eGFR >60 mL/min/1.73 m2eGFR AA* >60 TRIGLYCERIDES 227.0 mg/
dLCHOLESTEROL 282.0 mg/dLHDL 45.0 mg/dLTOT CHOL/HDL 6.3 LDL (CALC) 192.0 mg/
dLGLYCOHEMOGLOBIN A1C 6.10 %

 

 2012 9:42 AM  GLUCOSE 131.0 mg/dL

 

 2012 10:02 AM  GLUCOSE 150.0 mg/dLSODIUM 140.0 mmol/LPOTASSIUM 4.60 mmol/
LCHLORIDE 104.0 mmol/LCO2 25.0 mmol/LBUN 21.0 mg/dLCREATININE 0.90 mg/dLSGOT/
AST 19.0 IU/LSGPT/ALT 26.0 IU/LALK PHOS 99.0 IU/LTOTAL PROTEIN 7.10 g/dLALBUMIN 
4.20 g/dLTOTAL BILI 0.50 mg/dLCALCIUM 10.80 mg/dLAGE 57 GFR NonAA 65 GFR AA 79 
eGFR 60 eGFR AA* 60 TRIGLYCERIDES 210.0 mg/dLCHOLESTEROL 241.0 mg/dLHDL 48.0 mg/
dLTOT CHOL/HDL 5.0 LDL (CALC) 151.0 mg/dLGLYCOHEMOGLOBIN A1C 6.30 %

 

 2012 8:54 AM  GLUCOSE 125.0 mg/dL

 

 12/3/2013 8:58 AM  TRIGLYCERIDES 210.0 mg/dLCHOLESTEROL 211.0 mg/dLHDL 49.0 mg/
dLTOT CHOL/HDL 4.3 LDL (CALC) 120.0 mg/dLGLUCOSE 125.0 mg/dLSODIUM 140.0 mmol/
LPOTASSIUM 4.20 mmol/LCHLORIDE 105.0 mmol/LCO2 25.0 mmol/LBUN 24.0 mg/
dLCREATININE 0.90 mg/dLSGOT/AST 23.0 IU/LSGPT/ALT 28.0 IU/LALK PHOS 111.0 IU/
LTOTAL PROTEIN 7.0 g/dLALBUMIN 4.30 g/dLTOTAL BILI 0.60 mg/dLCALCIUM 10.60 mg/
dLAGE 58 GFR NonAA 64 GFR AA 78 eGFR >60 mL/min/1.73 m2eGFR AA* >60 HGB A1C 
6.90 %Est Avg Glucose 151.3 mg/dL

 

 2014 9:05 AM  TRIGLYCERIDES 210.0 mg/dLCHOLESTEROL 204.0 mg/dLHDL 51.0 mg/
dLTOT CHOL/HDL 4.0 LDL (CALC) 111.0 mg/dLGLUCOSE 120.0 mg/dLSODIUM 141.0 mmol/
LPOTASSIUM 4.40 mmol/LCHLORIDE 103.0 mmol/LCO2 25.0 mmol/LBUN 18.0 mg/
dLCREATININE 0.90 mg/dLSGOT/AST 24.0 IU/LSGPT/ALT 29.0 IU/LALK PHOS 98.0 IU/
LTOTAL PROTEIN 7.0 g/dLALBUMIN 4.10 g/dLTOTAL BILI 0.60 mg/dLCALCIUM 10.20 mg/
dLAGE 59 GFR NonAA 64 GFR AA 78 eGFR 60 eGFR AA* 60 HGB A1C 6.80 %Est Avg 
Glucose 148.5 mg/dL

 

 10/24/2016 10:22 AM  HGB A1C 6.50 %Est Avg Glucose 139.9 mg/dLGLUCOSE 129.0 mg/
dLSODIUM 143.0 mmol/LPOTASSIUM 4.40 mmol/LCHLORIDE 104.0 mmol/LCO2 24.0 mmol/
LBUN 25.0 mg/dLCREATININE 0.90 mg/dLSGOT/AST 25.0 IU/LSGPT/ALT 35.0 IU/LALK 
PHOS 96.0 IU/LTOTAL PROTEIN 7.10 g/dLALBUMIN 4.20 g/dLTOTAL BILI 0.50 mg/
dLCALCIUM 10.30 mg/dLAGE 61 GFR NonAA 64 GFR AA 78 eGFR >60 mL/min/1.73meGFR 
AA* >60 TRIGLYCERIDES 181.0 mg/dLCHOLESTEROL 201.0 mg/dLHDL 47.0 mg/dLTOT CHOL/
HDL 4.3 LDL (CALC) 118.0 mg/dLMICROALBUMIN UR <0.5 ug/mLVITAMIN B12 485.0 pg/mL







History Of Immunizations







 Name  Date Admin  Mfg Name  Mfg Code  Trade Name  Lot#  Route  Inj  Vis Given  
Vis Pub  CVX

 

 Tdap  2012  [employer]  SKB  ADACEL  [id number]  Intramuscular  Right 
Deltoid  2008  115

 

 Influenza  2015  Not Entered  NE  Afluria     Not Entered  Not Entered  1  141

 

 Influenza  2016  Not Entered  NE  Afluria     Not Entered  Not Entered  1  141







History of Past Illness







 Name  Date of Onset  Comments

 

 Hypertension  Dec  7 2009  1:45PM   

 

 Glucose Intolerance  Dec  7 2009  1:45PM   

 

 Hyperlipidemia  Dec  7 2009  1:45PM   

 

 Screening Mammogram  Dec  7 2009  1:45PM   

 

 Routine gynecological examination  Dec 21 2009  8:25AM   

 

 Hypertension Stable  Dec 21 2009  8:25AM   

 

 Hyperlipidemia, unspecified Stable  Dec 21 2009  8:25AM   

 

 Arthritis unspecified      

 

 Hyperlipidemia      

 

 Hypertension      

 

 Hypertension  Dec 17 2010  1:41PM   

 

 Glucose Intolerance  Dec 17 2010  1:41PM   

 

 Hyperlipidemia  Dec 17 2010  1:41PM   

 

 Hyperglycemia  Dec 17 2010  1:41PM   

 

 Routine gynecological examination  Dec 20 2010  1:52PM   

 

 Overactive bladder  Dec 20 2010  1:52PM   

 

 Hypertension  2011 12:05PM   

 

 Glucose Intolerance  2011 12:05PM   

 

 Hyperlipidemia  2011 12:05PM   

 

 Hyperglycemia  2011 12:05PM   

 

 Routine gynecological examination  Dec  8 2011  8:45AM   

 

 Overactive bladder  Dec  8 2011  8:45AM   

 

 Screening Examination for Breast Cancer  Dec  8 2011  8:45AM   

 

 Hyperglycemia  Dec  8 2011  8:45AM   

 

 Laceration 2nd digit right hand  May 21 2012 10:59AM   

 

 Acute Pharyngitis  Oct  8 2012  8:30AM   

 

 Hypertension  Dec 10 2012 11:59AM   

 

 Glucose Intolerance  Dec 10 2012 11:59AM   

 

 Hyperlipidemia  Dec 10 2012 11:59AM   

 

 Hyperglycemia  Dec 10 2012 11:59AM   

 

 Screening Examination for Breast Cancer  Dec 19 2012  8:02AM   

 

 Hypertension  Dec 19 2012  8:02AM   

 

 Hyperlipidemia, Mixed  Dec 19 2012  8:02AM   

 

 Hyperglycemia  Dec 19 2012  8:02AM   

 

 Upper Respiratory Infections  May 23 2013 10:32AM   

 

 Eczema  Aug 14 2013  9:49AM   

 

 Eustachian Tube Dysfunction, Bilateral  Aug 14 2013  9:49AM   

 

 Worried well  Sep 16 2013  9:56AM   

 

 Hypertension  Dec  2 2013 11:57AM   

 

 Glucose Intolerance  Dec  2 2013 11:57AM   

 

 Hyperlipidemia  Dec  2 2013 11:57AM   

 

 Hyperglycemia  Dec  2 2013 11:57AM   

 

 Routine gynecological examination  Dec 17 2013  8:06AM   

 

 Screening Examination for Breast Cancer  Dec 17 2013  8:06AM   

 

 Hypertension  2014 11:58AM   

 

 Glucose Intolerance  2014 11:58AM   

 

 Hyperlipidemia  2014 11:58AM   

 

 Hyperglycemia  2014 11:58AM   

 

 Essential Hypertension  2014 10:39AM   

 

 Diabetes Mellitus, Type II  2014 10:39AM   

 

 Hyperlipidemia, Mixed  2014 10:39AM   

 

 Sinusitis, Acute  Sep 14 2015  9:52AM   

 

 Bronchitis, Acute  Sep 14 2015  9:52AM   

 

 Arthritis unspecified  Oct 26 2015  8:37AM   

 

 Hypertension  Oct 20 2016  9:16AM   

 

 Diabetes Mellitus, Type II  Oct 20 2016  9:16AM   

 

 Hyperlipidemia  Oct 20 2016  9:16AM   

 

 Routine gynecological examination  2016  8:11AM   

 

 Screening Examination for Breast Cancer  2016  8:11AM   

 

 Visit for screening mammogram  2016  8:11AM   

 

 Hyperlipidemia  2016  8:11AM   

 

 Hypertension  2016  8:11AM   

 

 Controlled diabetes mellitus type II without complication  2016  8:11AM
   

 

 Pain in joint of right shoulder  2017  9:41AM   

 

 Acute bronchitis  Mar 27 2017  9:57AM   

 

 Acute non-recurrent maxillary sinusitis  Mar 27 2017  9:57AM   

 

 Hyperlipidemia  Oct 12 2017  8:08AM   

 

 Morbid Obesity  Oct 12 2017  8:08AM   

 

 Hypertension  Oct 12 2017  8:08AM   

 

 Hypertension  Mar 19 2018 10:08AM   

 

 Hyperlipemia  Mar 19 2018 10:08AM   

 

 Morbid obesity  Mar 19 2018 10:08AM   

 

 Family history of diabetes mellitus  Mar 19 2018 10:08AM   







Payers







 Insurance Name  Company Name  Plan Name  Plan Number  Policy Number  Policy 
Group Number  Start Date

 

    Medicare RHC Medicare RHC     [id number]     2008

 

    Medicare Part B  [employer] [place]     [id number]     2008

 

    Medicare Part A  Medicare Part A     [id number]     2008

 

    Medicare Part A  Medicare - Lab/Xray     [id number]     2008







History of Encounters







 Visit Date  Visit Type  Provider

 

 10/12/2017  Office visit  Brain Hauser DO

 

 3/27/2017  Office visit  Brain Hauser DO

 

 2017  Office visit  Brain Hauser DO

 

 2016  Office visit  Brain Hauser DO

 

 10/26/2015  Office visit  Brain Hauser DO

 

 2015  Office visit  Brain Hauser DO

 

 2014  Office visit  Brain Hauser DO

 

 2013  Office visit  Brain Murtaza DO

 

 2013  Office visit  Brain Shafferte DO

 

 2013  Office visit  Brain Hauser DO

 

 2013  Office visit  Margie RODAS

 

 2012  Office visit  Brain Hauser DO

 

 10/8/2012  Office visit  Brain Hauser DO

 

 2012  Office visit  Brain Hauser DO

 

 2011  Office visit  Brain Hauser DO

 

 2010  Office visit  Brain Hauser DO

 

 2009  Office visit  Brain Shafferte DO

 

 2009  Office visit  Brain Hauser DO

## 2019-02-11 NOTE — XMS REPORT
MU2 Ambulatory Summary

 Created on: 2015



Elena Deluca

External Reference #: [id number]

: 1955

Sex: Female



Demographics







 Address  [address]

[place]  [address]

 

 Home Phone  [phone]

 

 Preferred Language  English

 

 Marital Status  

 

 Jew Affiliation  Unknown

 

 Race  White

 

 Ethnic Group  Not  or 





Author







 Author  Brain Hauser

 

 [facility]

 

 Address  [address]

[place], [place]  [id number]



 

 Phone  [phone]







Care Team Providers







 Care Team Member Name  Role  Phone

 

 Brain Hauser  PCP  Unavailable







Allergies and Adverse Reactions







 Name  Reaction  Notes

 

 NO KNOWN DRUG ALLERGIES      







Plan of Treatment

Not available.



Medications







 Active 

 

 Name  Start Date  Estimated Completion Date  SIG  Comments

 

 Calcium 600mg        one tablet daily   

 

 Multivitamin Oral Tablet        take 1 tablet by oral route daily   

 

 aspirin 81 mg oral tablet        take 1 tablet (81 mg) by oral route once 
daily   

 

 Fish Oil 1,000 mg oral capsule  2010     take 2 capsules by oral route 
daily   

 

 valsartan-hydrochlorothiazide 160-12.5 mg oral tablet  2014     TAKE ONE 
TABLET BY MOUTH EVERY DAY   

 

 simvastatin 20 mg oral tablet  2014     TAKE ONE TABLET BY MOUTH EVERY 
DAY IN THE EVENING   

 

 amlodipine 10 mg oral tablet  2014  TAKE ONE TABLET BY MOUTH 
EVERY DAY   

 

 Osteo Bi-Flex 250-200 mg oral tablet        take 1 tablet by oral route daily 
  

 

 simvastatin 20 mg oral tablet  2014     TAKE ONE TABLET BY MOUTH EVERY 
DAY IN THE EVENING   

 

 valsartan-hydrochlorothiazide 160-12.5 mg oral tablet  2015     TAKE ONE 
TABLET BY MOUTH EVERY DAY   

 

 simvastatin 20 mg oral tablet  2015     TAKE ONE TABLET BY MOUTH ONCE 
DAILY AT BEDTIME   

 

 valsartan-hydrochlorothiazide 160-12.5 mg oral tablet  2015     TAKE ONE 
TABLET BY MOUTH ONCE DAILY   

 

 valsartan-hydrochlorothiazide 160-12.5 mg oral tablet  8/3/2015     TAKE ONE 
TABLET BY MOUTH ONCE DAILY   

 

 simvastatin 20 mg oral tablet  2015     TAKE ONE TABLET BY MOUTH ONCE 
DAILY AT BEDTIME   

 

 amlodipine 10 mg oral tablet  10/18/2015     TAKE ONE TABLET BY MOUTH ONCE 
DAILY   

 

 prednisone 20 mg oral tablet  10/26/2015     take 3 tablets (60 mg) by oral 
route once daily for 3 days then 2 tablets (40 mg) daily for 2 days   

 

 cyclobenzaprine 10 mg oral tablet  10/30/2015     take 1 tablet (10 mg) by 
oral route 3 times per day   









  

 

 Name  Start Date  Expiration Date  SIG  Comments

 

 Augmentin 500-125 mg oral tablet  10/8/2012  10/15/2012  take 1 tablet by oral 
route every 12 hours for 7 days   

 

 Diflucan 100 mg oral tablet  10/8/2012  10/11/2012  take 1 tablet (100 mg) by 
oral route once daily   

 

 amoxicillin 500 mg oral capsule  2013  take 1 capsule (500 mg) 
by oral route 3 times per day for 10 days   

 

 simvastatin 20 mg oral tablet  9/10/2013  10/10/2013  TAKE ONE TABLET BY MOUTH 
EVERY DAY IN THE EVENING   

 

 Augmentin 500-125 mg oral tablet  2015  take 1 tablet by oral 
route every 12 hours for 7 days   









 Discontinued 

 

 Name  Start Date  Discontinued Date  SIG  Comments

 

 niacin 500 mg oral tablet extended release 24 hr     2011  take 1 tablets 
(500 mg) by oral route once daily  Starting on Simvastatin

 

 Vesicare 5 mg oral tablet  2011  take 1 tablet by oral route 
daily for 30 days   

 

 Osteo Bi-Flex (5-Loxin) 1,500-400-100 mg-unit-mg oral tablet     2013  
take 1 tablet by oral route daily   

 

 Diovan -12.5 mg oral tablet  2012  TAKE ONE TABLET BY 
MOUTH EVERY DAY  generic on list

 

 Medrol (Lei) 4 mg oral tablets,dose pack  2013  take as 
directed   

 

 nystatin-triamcinolone 100,000-0.1 unit/g-% topical cream  2013  apply to affected area(s) by topical route 2 times a day   

 

 fexofenadine 180 mg oral tablet  2013  take 1 tablet (180 mg) 
by oral route once daily   







Problem List







 Description  Status  Onset

 

 Arthritis unspecified  Active   

 

 Hyperlipidemia  Active   

 

 Hypertension  Active   







Vital Signs







 Date  Time  BP-Sys(mm[Hg]  BP-Edelmira(mm[Hg])  HR(bpm)  RR(rpm)  Temp  WT  HT  HC  
BMI  BSA  BMI Percentile  O2 Sat(%)

 

 10/26/2015  8:35:00 AM  124 mmHg  70 mmHg  80 bpm  18 rpm  97.1 F     64 in   
            

 

 2015  9:50:00 AM  132 mmHg  88 mmHg  90 bpm  22 rpm  98.5 F  387 lbs  64 
in     66.4277 kg/m  2.8154 m     97 %

 

 2014  10:36:00 AM  142 mmHg  80 mmHg  78 bpm  22 rpm  97.9 F  381 lbs  64 
in     65.40 kg/m2  2.79 m2     97 %

 

 2013  8:04:00 AM  138 mmHg  70 mmHg  88 bpm  22 rpm  98.1 F  388 lbs  64 
in     66.5994 kg/m  2.8191 m     98 %

 

 2013  9:54:00 AM  136 mmHg  70 mmHg  88 bpm  24 rpm  97.1 F  386 lbs  64 
in     66.26 kg/m2  2.81 m2     97 %

 

 2013  9:47:00 AM  134 mmHg  78 mmHg  70 bpm  16 rpm  97.8 F  381 lbs  64 
in     65.3978 kg/m  2.7935 m      

 

 2013  10:27:00 AM  136 mmHg  84 mmHg  82 bpm  22 rpm  98.2 F  384.2 lbs  
64 in     65.95 kg/m2  2.81 m2     96 %

 

 2012  7:59:00 AM  138 mmHg  72 mmHg  70 bpm  18 rpm  98 F  377 lbs  64 
in     64.7112 kg/m  2.7788 m      

 

 10/8/2012  8:29:00 AM  138 mmHg  70 mmHg  72 bpm  18 rpm  98.1 F  371 lbs  64 
in     63.68 kg/m2  2.76 m2      

 

 2012  10:57:00 AM  136 mmHg  72 mmHg  68 bpm  18 rpm  98 F  371 lbs  64 
in     63.6814 kg/m  2.7566 m      

 

 2011  8:43:00 AM  144 mmHg  78 mmHg  72 bpm  18 rpm  98.2 F  396 lbs  64 
in     67.97 kg/m2  2.85 m2      

 

 2010  1:51:00 PM  142 mmHg  80 mmHg  76 bpm  16 rpm  98.7 F  382 lbs  64 
in     65.5695 kg/m  2.7972 m      

 

 2009  8:23:00 AM  132 mmHg  78 mmHg  70 bpm  24 rpm  98.3 F  377 lbs  64 
in     64.71 kg/m2  2.78 m2      







Social History







 Name  Description  Comments

 

 Tobacco  Former smoker  for 2 years

 

       







History of Procedures







 Date Ordered  Description  Order Status

 

 10/26/2015 12:00 AM  X-RAY EXAM OF HIP  Reviewed

 

 10/26/2015 12:00 AM  X-RAY EXAM OF KNEE 3  Reviewed

 

 2011 12:00 AM  COMPREHEN METABOLIC PANEL  Reviewed

 

 2011 12:00 AM  LIPID PANEL  Reviewed

 

 2011 12:00 AM  GLYCOSYLATED HEMOGLOBIN TEST  Reviewed

 

 2011 12:00 AM  CYTOPATH C/V THIN LAYER  Reviewed

 

 2011 12:00 AM  MAMMOGRAM SCREENING  Reviewed

 

 2012 12:00 AM  ASSAY GLUCOSE BLOOD QUANT  Reviewed

 

 2012 12:00 AM  TDAP VACCINE 7 YRS/> IM  Reviewed

 

 2009 12:00 AM  COMPREHEN METABOLIC PANEL  Reviewed

 

 2009 12:00 AM  LIPID PANEL  Reviewed

 

 2009 12:00 AM  GLYCOSYLATED HEMOGLOBIN TEST  Reviewed

 

 2009 12:00 AM  MAMMOGRAM SCREENING  Reviewed

 

 12/10/2012 12:00 AM  COMPREHEN METABOLIC PANEL  Reviewed

 

 12/10/2012 12:00 AM  LIPID PANEL  Reviewed

 

 12/10/2012 12:00 AM  GLYCOSYLATED HEMOGLOBIN TEST  Reviewed

 

 2012 12:00 AM  MAMMOGRAM SCREENING  Reviewed

 

 2012 12:00 AM  ASSAY GLUCOSE BLOOD QUANT  Reviewed

 

 2013 12:00 AM  COMPREHEN METABOLIC PANEL  Reviewed

 

 2013 12:00 AM  LIPID PANEL  Reviewed

 

 2013 12:00 AM  GLYCOSYLATED HEMOGLOBIN TEST  Reviewed

 

 2013 12:00 AM  CYTOPATH C/V THIN LAYER  Reviewed

 

 2013 12:00 AM  MAMMOGRAM SCREENING  Reviewed

 

 2009 12:00 AM  CYTOPATH C/V THIN LAYER  Reviewed

 

 2010 12:00 AM  COMPREHEN METABOLIC PANEL  Reviewed

 

 2010 12:00 AM  LIPID PANEL  Reviewed

 

 2010 12:00 AM  GLYCOSYLATED HEMOGLOBIN TEST  Reviewed

 

 2010 12:00 AM  CYTOPATH C/V THIN LAYER  Reviewed

 

 2010 12:00 AM  MAMMOGRAM SCREENING  Reviewed

 

 2014 12:00 AM  COMPREHEN METABOLIC PANEL  Reviewed

 

 2014 12:00 AM  LIPID PANEL  Reviewed

 

 2014 12:00 AM  GLYCOSYLATED HEMOGLOBIN TEST  Reviewed







Results Summary







 Data and Description  Results

 

 2009 10:01 AM  .0 mg/dLCHOLESTEROL 258 mg/dLTRIGLYCERIDES 252.0 mg
/dLHDL 51.0 mg/dLLDL 153.0 mg/dLTRIGLYCERIDES 252.0 mg/dLHDL 51.0 mg/
dLCHOLESTEROL 258 mg/dLGLUCOSE 116 SODIUM 144.0 mmol/LPOTASSIUM 4.80 mmol/
LCHLORIDE 105.0 mmol/LCO2 24.0 mmol/LBUN 18.0 mg/dLCREATININE 0.80 mg/dLSGOT/
AST 23.0 IU/LSGPT/ALT 26.0 IU/LALK PHOS 98.0 IU/LTOTAL PROTEIN 7.30 g/dLALBUMIN 
4.20 g/dLTOTAL BILI 0.40 mg/dLCALCIUM 10.30 mg/dLeGFR >60 mL/minGLUCOSE 116 
SODIUM 144.0 mmol/LPOTASSIUM 4.80 mmol/LCHLORIDE 105.0 mmol/LCO2 24.0 mmol/LBUN 
18.0 mg/dLCREATININE 0.80 mg/dLSGOT/AST 23.0 IU/LSGPT/ALT 26.0 IU/LALK PHOS 
98.0 IU/LTOTAL PROTEIN 7.30 g/dLALBUMIN 4.20 g/dLTOTAL BILI 0.40 mg/dLCALCIUM 
10.30 mg/dLeGFR >60 mL/min

 

 2011 9:00 AM  GLUCOSE 130.0 mg/dLSODIUM 139.0 mmol/LPOTASSIUM 4.40 mmol/
LCHLORIDE 104.0 mmol/LCO2 26.0 mmol/LBUN 21.0 mg/dLCREATININE 0.80 mg/dLSGOT/
AST 19.0 IU/LSGPT/ALT 25.0 IU/LALK PHOS 108.0 IU/LTOTAL PROTEIN 7.10 g/
dLALBUMIN 4.20 g/dLTOTAL BILI 0.40 mg/dLCALCIUM 10.20 mg/dLeGFR >60 mL/min/1.73 
[id number] 227.0 mg/dLCHOLESTEROL 282.0 mg/dLHDL 45.0 mg/dLLDL (CALC) 
192.0 mg/dL

 

 2012 9:42 AM  GLUCOSE 131.0 mg/dL

 

 2012 10:02 AM  GLUCOSE 150.0 mg/dLSODIUM 140.0 mmol/LPOTASSIUM 4.60 mmol/
LCHLORIDE 104.0 mmol/LCO2 25.0 mmol/LBUN 21.0 mg/dLCREATININE 0.90 mg/dLSGOT/
AST 19.0 IU/LSGPT/ALT 26.0 IU/LALK PHOS 99.0 IU/LTOTAL PROTEIN 7.10 g/dLALBUMIN 
4.20 g/dLTOTAL BILI 0.50 mg/dLCALCIUM 10.80 mg/dLeGFR 60 TRIGLYCERIDES 210.0 mg/
dLCHOLESTEROL 241.0 mg/dLHDL 48.0 mg/dLLDL (CALC) 151.0 mg/dL

 

 2012 8:54 AM  GLUCOSE 125.0 mg/dL

 

 12/3/2013 8:58 AM  TRIGLYCERIDES 210.0 mg/dLCHOLESTEROL 211.0 mg/dLHDL 49.0 mg/
dLLDL (CALC) 120.0 mg/dLGLUCOSE 125.0 mg/dLSODIUM 140.0 mmol/LPOTASSIUM 4.20 
mmol/LCHLORIDE 105.0 mmol/LCO2 25.0 mmol/LBUN 24.0 mg/dLCREATININE 0.90 mg/
dLSGOT/AST 23.0 IU/LSGPT/ALT 28.0 IU/LALK PHOS 111.0 IU/LTOTAL PROTEIN 7.0 g/
dLALBUMIN 4.30 g/dLTOTAL BILI 0.60 mg/dLCALCIUM 10.60 mg/dLeGFR >60 mL/min/1.73 
m2Est Avg Glucose 151.3 mg/dL

 

 2014 9:05 AM  TRIGLYCERIDES 210.0 mg/dLCHOLESTEROL 204.0 mg/dLHDL 51.0 mg/
dLLDL (CALC) 111.0 mg/dLGLUCOSE 120.0 mg/dLSODIUM 141.0 mmol/LPOTASSIUM 4.40 
mmol/LCHLORIDE 103.0 mmol/LCO2 25.0 mmol/LBUN 18.0 mg/dLCREATININE 0.90 mg/
dLSGOT/AST 24.0 IU/LSGPT/ALT 29.0 IU/LALK PHOS 98.0 IU/LTOTAL PROTEIN 7.0 g/
dLALBUMIN 4.10 g/dLTOTAL BILI 0.60 mg/dLCALCIUM 10.20 mg/dLeGFR 60 Est Avg 
Glucose 148.5 mg/dL







History Of Immunizations







 Name  Date Admin  Mfg Name  Mfg Code  Trade Name  Lot#  Route  Inj  Vis Given  
Vis Pub  CVX

 

 Tdap  2012  Purkinje  SKB  ADACEL  [id number]  Intramuscular  Right 
Deltoid  2008  115







History of Past Illness







 Name  Date of Onset  Comments

 

 Hypertension  Dec  7 2009  1:45PM   

 

 Glucose Intolerance  Dec  7 2009  1:45PM   

 

 Hyperlipidemia  Dec  7 2009  1:45PM   

 

 Screening Mammogram  Dec  7 2009  1:45PM   

 

 Routine gynecological examination  Dec 21 2009  8:25AM   

 

 Hypertension Stable  Dec 21 2009  8:25AM   

 

 Hyperlipidemia, unspecified Stable  Dec 21 2009  8:25AM   

 

 Arthritis unspecified      

 

 Hyperlipidemia      

 

 Hypertension      

 

 Hypertension  Dec 17 2010  1:41PM   

 

 Glucose Intolerance  Dec 17 2010  1:41PM   

 

 Hyperlipidemia  Dec 17 2010  1:41PM   

 

 Hyperglycemia  Dec 17 2010  1:41PM   

 

 Routine gynecological examination  Dec 20 2010  1:52PM   

 

 Overactive bladder  Dec 20 2010  1:52PM   

 

 Hypertension  2011 12:05PM   

 

 Glucose Intolerance  2011 12:05PM   

 

 Hyperlipidemia  2011 12:05PM   

 

 Hyperglycemia  2011 12:05PM   

 

 Routine gynecological examination  Dec  8 2011  8:45AM   

 

 Overactive bladder  Dec  8 2011  8:45AM   

 

 Screening Examination for Breast Cancer  Dec  8 2011  8:45AM   

 

 Hyperglycemia  Dec  8 2011  8:45AM   

 

 Laceration 2nd digit right hand  May 21 2012 10:59AM   

 

 Acute Pharyngitis  Oct  8 2012  8:30AM   

 

 Hypertension  Dec 10 2012 11:59AM   

 

 Glucose Intolerance  Dec 10 2012 11:59AM   

 

 Hyperlipidemia  Dec 10 2012 11:59AM   

 

 Hyperglycemia  Dec 10 2012 11:59AM   

 

 Screening Examination for Breast Cancer  Dec 19 2012  8:02AM   

 

 Hypertension  Dec 19 2012  8:02AM   

 

 Hyperlipidemia, Mixed  Dec 19 2012  8:02AM   

 

 Hyperglycemia  Dec 19 2012  8:02AM   

 

 Upper Respiratory Infections  May 23 2013 10:32AM   

 

 Eczema  Aug 14 2013  9:49AM   

 

 Eustachian Tube Dysfunction, Bilateral  Aug 14 2013  9:49AM   

 

 Worried well  Sep 16 2013  9:56AM   

 

 Hypertension  Dec  2 2013 11:57AM   

 

 Glucose Intolerance  Dec  2 2013 11:57AM   

 

 Hyperlipidemia  Dec  2 2013 11:57AM   

 

 Hyperglycemia  Dec  2 2013 11:57AM   

 

 Routine gynecological examination  Dec 17 2013  8:06AM   

 

 Screening Examination for Breast Cancer  Dec 17 2013  8:06AM   

 

 Hypertension  2014 11:58AM   

 

 Glucose Intolerance  2014 11:58AM   

 

 Hyperlipidemia  2014 11:58AM   

 

 Hyperglycemia  2014 11:58AM   

 

 Essential Hypertension  2014 10:39AM   

 

 Diabetes Mellitus, Type II  2014 10:39AM   

 

 Hyperlipidemia, Mixed  2014 10:39AM   

 

 Sinusitis, Acute  Sep 14 2015  9:52AM   

 

 Bronchitis, Acute  Sep 14 2015  9:52AM   

 

 Arthritis unspecified  Oct 26 2015  8:37AM   







Payers







 Insurance Name  Company Name  Plan Name  Plan Number  Policy Number  Policy 
Group Number  Start Date

 

    Medicare Part A  Medicare Part A     [id number]     2008

 

    Medicare Part B  [employer] [place]     [id number]     2008







History of Encounters







 Visit Date  Visit Type  Provider

 

 10/26/2015  Office visit  Brain Hauser DO

 

 2015  Office visit  Brain Hauser DO

 

 2014  Office visit  Brain Hauser DO

 

 2013  Office visit  Brain Hauser DO

 

 2013  Office visit  Brain Hauser DO

 

 2013  Office visit  Brain Hauser DO

 

 2013  Office visit  Margie RODAS

 

 2012  Office visit  Brain Hauser DO

 

 10/8/2012  Office visit  Brain Hauser DO

 

 2012  Office visit  Brain Hauser DO

 

 2011  Office visit  Brain Hauser DO

 

 2010  Office visit  Brain Hauser DO

 

 2009  Office visit  Brain Hauser DO

 

 2009  Office visit  Brain Hauser DO

## 2019-02-11 NOTE — XMS REPORT
MU2 Ambulatory Summary

 Created on: 10/30/2015



Elena Deluca

External Reference #: [id number]

: 1955

Sex: Female



Demographics







 Address  [address]

[place]  [address]

 

 Home Phone  [phone]

 

 Preferred Language  English

 

 Marital Status  

 

 Presybeterian Affiliation  Unknown

 

 Race  White

 

 Ethnic Group  Not  or 





Author







 Author  Brain Hauser

 

 [facility]

 

 Address  [address]

[place], [place]  [id number]



 

 Phone  [phone]







Care Team Providers







 Care Team Member Name  Role  Phone

 

 Brain Hauser  PCP  Unavailable







Allergies and Adverse Reactions







 Name  Reaction  Notes

 

 NO KNOWN DRUG ALLERGIES      







Plan of Treatment

Not available.



Medications







 Active 

 

 Name  Start Date  Estimated Completion Date  SIG  Comments

 

 Calcium 600mg        one tablet daily   

 

 Multivitamin Oral Tablet        take 1 tablet by oral route daily   

 

 aspirin 81 mg oral tablet        take 1 tablet (81 mg) by oral route once 
daily   

 

 Fish Oil 1,000 mg oral capsule  2010     take 2 capsules by oral route 
daily   

 

 valsartan-hydrochlorothiazide 160-12.5 mg oral tablet  2014     TAKE ONE 
TABLET BY MOUTH EVERY DAY   

 

 simvastatin 20 mg oral tablet  2014     TAKE ONE TABLET BY MOUTH EVERY 
DAY IN THE EVENING   

 

 amlodipine 10 mg oral tablet  2014  TAKE ONE TABLET BY MOUTH 
EVERY DAY   

 

 Osteo Bi-Flex 250-200 mg oral tablet        take 1 tablet by oral route daily 
  

 

 simvastatin 20 mg oral tablet  2014     TAKE ONE TABLET BY MOUTH EVERY 
DAY IN THE EVENING   

 

 valsartan-hydrochlorothiazide 160-12.5 mg oral tablet  2015     TAKE ONE 
TABLET BY MOUTH EVERY DAY   

 

 simvastatin 20 mg oral tablet  2015     TAKE ONE TABLET BY MOUTH ONCE 
DAILY AT BEDTIME   

 

 valsartan-hydrochlorothiazide 160-12.5 mg oral tablet  2015     TAKE ONE 
TABLET BY MOUTH ONCE DAILY   

 

 valsartan-hydrochlorothiazide 160-12.5 mg oral tablet  8/3/2015     TAKE ONE 
TABLET BY MOUTH ONCE DAILY   

 

 simvastatin 20 mg oral tablet  2015     TAKE ONE TABLET BY MOUTH ONCE 
DAILY AT BEDTIME   

 

 amlodipine 10 mg oral tablet  10/18/2015     TAKE ONE TABLET BY MOUTH ONCE 
DAILY   

 

 cyclobenzaprine 10 mg oral tablet  10/26/2015     take 1 tablet (10 mg) by 
oral route 3 times per day   

 

 prednisone 20 mg oral tablet  10/26/2015     take 3 tablets (60 mg) by oral 
route once daily for 3 days then 2 tablets (40 mg) daily for 2 days   









  

 

 Name  Start Date  Expiration Date  SIG  Comments

 

 Augmentin 500-125 mg oral tablet  10/8/2012  10/15/2012  take 1 tablet by oral 
route every 12 hours for 7 days   

 

 Diflucan 100 mg oral tablet  10/8/2012  10/11/2012  take 1 tablet (100 mg) by 
oral route once daily   

 

 amoxicillin 500 mg oral capsule  2013  take 1 capsule (500 mg) 
by oral route 3 times per day for 10 days   

 

 simvastatin 20 mg oral tablet  9/10/2013  10/10/2013  TAKE ONE TABLET BY MOUTH 
EVERY DAY IN THE EVENING   

 

 Augmentin 500-125 mg oral tablet  2015  take 1 tablet by oral 
route every 12 hours for 7 days   









 Discontinued 

 

 Name  Start Date  Discontinued Date  SIG  Comments

 

 niacin 500 mg oral tablet extended release 24 hr     2011  take 1 tablets 
(500 mg) by oral route once daily  Starting on Simvastatin

 

 Vesicare 5 mg oral tablet  2011  take 1 tablet by oral route 
daily for 30 days   

 

 Osteo Bi-Flex (5-Loxin) 1,500-400-100 mg-unit-mg oral tablet     2013  
take 1 tablet by oral route daily   

 

 Diovan -12.5 mg oral tablet  2012  TAKE ONE TABLET BY 
MOUTH EVERY DAY  generic on list

 

 Medrol (Lei) 4 mg oral tablets,dose pack  2013  take as 
directed   

 

 nystatin-triamcinolone 100,000-0.1 unit/g-% topical cream  2013  apply to affected area(s) by topical route 2 times a day   

 

 fexofenadine 180 mg oral tablet  2013  take 1 tablet (180 mg) 
by oral route once daily   







Problem List







 Description  Status  Onset

 

 Arthritis unspecified  Active   

 

 Hyperlipidemia  Active   

 

 Hypertension  Active   







Vital Signs







 Date  Time  BP-Sys(mm[Hg]  BP-Edelmira(mm[Hg])  HR(bpm)  RR(rpm)  Temp  WT  HT  HC  
BMI  BSA  BMI Percentile  O2 Sat(%)

 

 10/26/2015  8:35:00 AM  124 mmHg  70 mmHg  80 bpm  18 rpm  97.1 F     64 in   
            

 

 2015  9:50:00 AM  132 mmHg  88 mmHg  90 bpm  22 rpm  98.5 F  387 lbs  64 
in     66.4277 kg/m  2.8154 m     97 %

 

 2014  10:36:00 AM  142 mmHg  80 mmHg  78 bpm  22 rpm  97.9 F  381 lbs  64 
in     65.40 kg/m2  2.79 m2     97 %

 

 2013  8:04:00 AM  138 mmHg  70 mmHg  88 bpm  22 rpm  98.1 F  388 lbs  64 
in     66.5994 kg/m  2.8191 m     98 %

 

 2013  9:54:00 AM  136 mmHg  70 mmHg  88 bpm  24 rpm  97.1 F  386 lbs  64 
in     66.26 kg/m2  2.81 m2     97 %

 

 2013  9:47:00 AM  134 mmHg  78 mmHg  70 bpm  16 rpm  97.8 F  381 lbs  64 
in     65.3978 kg/m  2.7935 m      

 

 2013  10:27:00 AM  136 mmHg  84 mmHg  82 bpm  22 rpm  98.2 F  384.2 lbs  
64 in     65.95 kg/m2  2.81 m2     96 %

 

 2012  7:59:00 AM  138 mmHg  72 mmHg  70 bpm  18 rpm  98 F  377 lbs  64 
in     64.7112 kg/m  2.7788 m      

 

 10/8/2012  8:29:00 AM  138 mmHg  70 mmHg  72 bpm  18 rpm  98.1 F  371 lbs  64 
in     63.68 kg/m2  2.76 m2      

 

 2012  10:57:00 AM  136 mmHg  72 mmHg  68 bpm  18 rpm  98 F  371 lbs  64 
in     63.6814 kg/m  2.7566 m      

 

 2011  8:43:00 AM  144 mmHg  78 mmHg  72 bpm  18 rpm  98.2 F  396 lbs  64 
in     67.97 kg/m2  2.85 m2      

 

 2010  1:51:00 PM  142 mmHg  80 mmHg  76 bpm  16 rpm  98.7 F  382 lbs  64 
in     65.5695 kg/m  2.7972 m      

 

 2009  8:23:00 AM  132 mmHg  78 mmHg  70 bpm  24 rpm  98.3 F  377 lbs  64 
in     64.71 kg/m2  2.78 m2      







Social History







 Name  Description  Comments

 

 Tobacco  Former smoker  for 2 years

 

       







History of Procedures







 Date Ordered  Description  Order Status

 

 10/26/2015 12:00 AM  X-RAY EXAM OF HIP  Reviewed

 

 10/26/2015 12:00 AM  X-RAY EXAM OF KNEE 3  Reviewed

 

 2011 12:00 AM  COMPREHEN METABOLIC PANEL  Reviewed

 

 2011 12:00 AM  LIPID PANEL  Reviewed

 

 2011 12:00 AM  GLYCOSYLATED HEMOGLOBIN TEST  Reviewed

 

 2011 12:00 AM  CYTOPATH C/V THIN LAYER  Reviewed

 

 2011 12:00 AM  MAMMOGRAM SCREENING  Reviewed

 

 2012 12:00 AM  ASSAY GLUCOSE BLOOD QUANT  Reviewed

 

 2012 12:00 AM  TDAP VACCINE 7 YRS/> IM  Reviewed

 

 2009 12:00 AM  COMPREHEN METABOLIC PANEL  Reviewed

 

 2009 12:00 AM  LIPID PANEL  Reviewed

 

 2009 12:00 AM  GLYCOSYLATED HEMOGLOBIN TEST  Reviewed

 

 2009 12:00 AM  MAMMOGRAM SCREENING  Reviewed

 

 12/10/2012 12:00 AM  COMPREHEN METABOLIC PANEL  Reviewed

 

 12/10/2012 12:00 AM  LIPID PANEL  Reviewed

 

 12/10/2012 12:00 AM  GLYCOSYLATED HEMOGLOBIN TEST  Reviewed

 

 2012 12:00 AM  MAMMOGRAM SCREENING  Reviewed

 

 2012 12:00 AM  ASSAY GLUCOSE BLOOD QUANT  Reviewed

 

 2013 12:00 AM  COMPREHEN METABOLIC PANEL  Reviewed

 

 2013 12:00 AM  LIPID PANEL  Reviewed

 

 2013 12:00 AM  GLYCOSYLATED HEMOGLOBIN TEST  Reviewed

 

 2013 12:00 AM  CYTOPATH C/V THIN LAYER  Reviewed

 

 2013 12:00 AM  MAMMOGRAM SCREENING  Reviewed

 

 2009 12:00 AM  CYTOPATH C/V THIN LAYER  Reviewed

 

 2010 12:00 AM  COMPREHEN METABOLIC PANEL  Reviewed

 

 2010 12:00 AM  LIPID PANEL  Reviewed

 

 2010 12:00 AM  GLYCOSYLATED HEMOGLOBIN TEST  Reviewed

 

 2010 12:00 AM  CYTOPATH C/V THIN LAYER  Reviewed

 

 2010 12:00 AM  MAMMOGRAM SCREENING  Reviewed

 

 2014 12:00 AM  COMPREHEN METABOLIC PANEL  Reviewed

 

 2014 12:00 AM  LIPID PANEL  Reviewed

 

 2014 12:00 AM  GLYCOSYLATED HEMOGLOBIN TEST  Reviewed







Results Summary







 Data and Description  Results

 

 2009 10:01 AM  .0 mg/dLCHOLESTEROL 258 mg/dLTRIGLYCERIDES 252.0 mg
/dLHDL 51.0 mg/dLLDL 153.0 mg/dLTRIGLYCERIDES 252.0 mg/dLHDL 51.0 mg/
dLCHOLESTEROL 258 mg/dLGLUCOSE 116 SODIUM 144.0 mmol/LPOTASSIUM 4.80 mmol/
LCHLORIDE 105.0 mmol/LCO2 24.0 mmol/LBUN 18.0 mg/dLCREATININE 0.80 mg/dLSGOT/
AST 23.0 IU/LSGPT/ALT 26.0 IU/LALK PHOS 98.0 IU/LTOTAL PROTEIN 7.30 g/dLALBUMIN 
4.20 g/dLTOTAL BILI 0.40 mg/dLCALCIUM 10.30 mg/dLeGFR >60 mL/minGLUCOSE 116 
SODIUM 144.0 mmol/LPOTASSIUM 4.80 mmol/LCHLORIDE 105.0 mmol/LCO2 24.0 mmol/LBUN 
18.0 mg/dLCREATININE 0.80 mg/dLSGOT/AST 23.0 IU/LSGPT/ALT 26.0 IU/LALK PHOS 
98.0 IU/LTOTAL PROTEIN 7.30 g/dLALBUMIN 4.20 g/dLTOTAL BILI 0.40 mg/dLCALCIUM 
10.30 mg/dLeGFR >60 mL/min

 

 2011 9:00 AM  GLUCOSE 130.0 mg/dLSODIUM 139.0 mmol/LPOTASSIUM 4.40 mmol/
LCHLORIDE 104.0 mmol/LCO2 26.0 mmol/LBUN 21.0 mg/dLCREATININE 0.80 mg/dLSGOT/
AST 19.0 IU/LSGPT/ALT 25.0 IU/LALK PHOS 108.0 IU/LTOTAL PROTEIN 7.10 g/
dLALBUMIN 4.20 g/dLTOTAL BILI 0.40 mg/dLCALCIUM 10.20 mg/dLeGFR >60 mL/min/1.73 
[id number] 227.0 mg/dLCHOLESTEROL 282.0 mg/dLHDL 45.0 mg/dLLDL (CALC) 
192.0 mg/dL

 

 2012 9:42 AM  GLUCOSE 131.0 mg/dL

 

 2012 10:02 AM  GLUCOSE 150.0 mg/dLSODIUM 140.0 mmol/LPOTASSIUM 4.60 mmol/
LCHLORIDE 104.0 mmol/LCO2 25.0 mmol/LBUN 21.0 mg/dLCREATININE 0.90 mg/dLSGOT/
AST 19.0 IU/LSGPT/ALT 26.0 IU/LALK PHOS 99.0 IU/LTOTAL PROTEIN 7.10 g/dLALBUMIN 
4.20 g/dLTOTAL BILI 0.50 mg/dLCALCIUM 10.80 mg/dLeGFR 60 TRIGLYCERIDES 210.0 mg/
dLCHOLESTEROL 241.0 mg/dLHDL 48.0 mg/dLLDL (CALC) 151.0 mg/dL

 

 2012 8:54 AM  GLUCOSE 125.0 mg/dL

 

 12/3/2013 8:58 AM  TRIGLYCERIDES 210.0 mg/dLCHOLESTEROL 211.0 mg/dLHDL 49.0 mg/
dLLDL (CALC) 120.0 mg/dLGLUCOSE 125.0 mg/dLSODIUM 140.0 mmol/LPOTASSIUM 4.20 
mmol/LCHLORIDE 105.0 mmol/LCO2 25.0 mmol/LBUN 24.0 mg/dLCREATININE 0.90 mg/
dLSGOT/AST 23.0 IU/LSGPT/ALT 28.0 IU/LALK PHOS 111.0 IU/LTOTAL PROTEIN 7.0 g/
dLALBUMIN 4.30 g/dLTOTAL BILI 0.60 mg/dLCALCIUM 10.60 mg/dLeGFR >60 mL/min/1.73 
m2Est Avg Glucose 151.3 mg/dL

 

 2014 9:05 AM  TRIGLYCERIDES 210.0 mg/dLCHOLESTEROL 204.0 mg/dLHDL 51.0 mg/
dLLDL (CALC) 111.0 mg/dLGLUCOSE 120.0 mg/dLSODIUM 141.0 mmol/LPOTASSIUM 4.40 
mmol/LCHLORIDE 103.0 mmol/LCO2 25.0 mmol/LBUN 18.0 mg/dLCREATININE 0.90 mg/
dLSGOT/AST 24.0 IU/LSGPT/ALT 29.0 IU/LALK PHOS 98.0 IU/LTOTAL PROTEIN 7.0 g/
dLALBUMIN 4.10 g/dLTOTAL BILI 0.60 mg/dLCALCIUM 10.20 mg/dLeGFR 60 Est Avg 
Glucose 148.5 mg/dL







History Of Immunizations







 Name  Date Admin  Mfg Name  Mfg Code  Trade Name  Lot#  Route  Inj  Vis Given  
Vis Pub  CVX

 

 Tdap  2012  [employer]  SKB  ADACEL  [id number]  Intramuscular  Right 
Deltoid  2008  115







History of Past Illness







 Name  Date of Onset  Comments

 

 Hypertension  Dec  7 2009  1:45PM   

 

 Glucose Intolerance  Dec  7 2009  1:45PM   

 

 Hyperlipidemia  Dec  7 2009  1:45PM   

 

 Screening Mammogram  Dec  7 2009  1:45PM   

 

 Routine gynecological examination  Dec 21 2009  8:25AM   

 

 Hypertension Stable  Dec 21 2009  8:25AM   

 

 Hyperlipidemia, unspecified Stable  Dec 21 2009  8:25AM   

 

 Arthritis unspecified      

 

 Hyperlipidemia      

 

 Hypertension      

 

 Hypertension  Dec 17 2010  1:41PM   

 

 Glucose Intolerance  Dec 17 2010  1:41PM   

 

 Hyperlipidemia  Dec 17 2010  1:41PM   

 

 Hyperglycemia  Dec 17 2010  1:41PM   

 

 Routine gynecological examination  Dec 20 2010  1:52PM   

 

 Overactive bladder  Dec 20 2010  1:52PM   

 

 Hypertension  2011 12:05PM   

 

 Glucose Intolerance  2011 12:05PM   

 

 Hyperlipidemia  2011 12:05PM   

 

 Hyperglycemia  2011 12:05PM   

 

 Routine gynecological examination  Dec  8 2011  8:45AM   

 

 Overactive bladder  Dec  8 2011  8:45AM   

 

 Screening Examination for Breast Cancer  Dec  8 2011  8:45AM   

 

 Hyperglycemia  Dec  8 2011  8:45AM   

 

 Laceration 2nd digit right hand  May 21 2012 10:59AM   

 

 Acute Pharyngitis  Oct  8 2012  8:30AM   

 

 Hypertension  Dec 10 2012 11:59AM   

 

 Glucose Intolerance  Dec 10 2012 11:59AM   

 

 Hyperlipidemia  Dec 10 2012 11:59AM   

 

 Hyperglycemia  Dec 10 2012 11:59AM   

 

 Screening Examination for Breast Cancer  Dec 19 2012  8:02AM   

 

 Hypertension  Dec 19 2012  8:02AM   

 

 Hyperlipidemia, Mixed  Dec 19 2012  8:02AM   

 

 Hyperglycemia  Dec 19 2012  8:02AM   

 

 Upper Respiratory Infections  May 23 2013 10:32AM   

 

 Eczema  Aug 14 2013  9:49AM   

 

 Eustachian Tube Dysfunction, Bilateral  Aug 14 2013  9:49AM   

 

 Worried well  Sep 16 2013  9:56AM   

 

 Hypertension  Dec  2 2013 11:57AM   

 

 Glucose Intolerance  Dec  2 2013 11:57AM   

 

 Hyperlipidemia  Dec  2 2013 11:57AM   

 

 Hyperglycemia  Dec  2 2013 11:57AM   

 

 Routine gynecological examination  Dec 17 2013  8:06AM   

 

 Screening Examination for Breast Cancer  Dec 17 2013  8:06AM   

 

 Hypertension  2014 11:58AM   

 

 Glucose Intolerance  2014 11:58AM   

 

 Hyperlipidemia  2014 11:58AM   

 

 Hyperglycemia  2014 11:58AM   

 

 Essential Hypertension  2014 10:39AM   

 

 Diabetes Mellitus, Type II  2014 10:39AM   

 

 Hyperlipidemia, Mixed  2014 10:39AM   

 

 Sinusitis, Acute  Sep 14 2015  9:52AM   

 

 Bronchitis, Acute  Sep 14 2015  9:52AM   

 

 Arthritis unspecified  Oct 26 2015  8:37AM   







Payers







 Insurance Name  Company Name  Plan Name  Plan Number  Policy Number  Policy 
Group Number  Start Date

 

    Medicare Part A  Medicare Part A     [id number]     2008

 

    Medicare Part B  [employer] [place]     [id number]     2008







History of Encounters







 Visit Date  Visit Type  Provider

 

 10/26/2015  Office visit  Brain Hauser DO

 

 2015  Office visit  Brain Hauser DO

 

 2014  Office visit  Brain Hauser DO

 

 2013  Office visit  Brain Hauser DO

 

 2013  Office visit  Brain Hauser DO

 

 2013  Office visit  Brain Hauser DO

 

 2013  Office visit  Margie RODAS

 

 2012  Office visit  Brain Hauser DO

 

 10/8/2012  Office visit  Brain Hauser DO

 

 2012  Office visit  Brain Hauser DO

 

 2011  Office visit  rBain Hauser DO

 

 2010  Office visit  Brain Hauser DO

 

 2009  Office visit  Brain Hauser DO

 

 2009  Office visit  Brain Hauser DO

## 2019-02-11 NOTE — XMS REPORT
MU2 Ambulatory Summary

 Created on: 2018



Elena Deluca

External Reference #: [id number]

: 1955

Sex: Female



Demographics







 Address  [address] [place]  [address]

 

 Home Phone  [phone]

 

 Preferred Language  English

 

 Marital Status  

 

 Rastafari Affiliation  Unknown

 

 Race  White

 

 Ethnic Group  Not  or 





Author







 Author  Brain Hauser

 

 [facility]

 

 Address  [address]

[place], [place]  [id number]



 

 Phone  [phone]







Care Team Providers







 Care Team Member Name  Role  Phone

 

 Brain Hauser  PCP  [phone]

 

 Brain Hauser  PreferredProvider  [phone]







Allergies and Adverse Reactions







 Name  Reaction  Notes

 

 NO KNOWN DRUG ALLERGIES      







Plan of Treatment







 Planned Activity  Comments  Planned Date  Planned Time  Plan/Goal

 

 Discuss Bariatric Surgery options. She needs to lose 100 lbs in order to have 
Left Total Knee Replacement.            







Medications







 Active 

 

 Name  Start Date  Estimated Completion Date  SIG  Comments

 

 Multivitamin Oral Tablet        take 1 tablet by oral route daily   

 

 aspirin 81 mg oral tablet        take 1 tablet (81 mg) by oral route once 
daily   

 

 Fish Oil 1,000 mg oral capsule  2010     take 2 capsules by oral route 
daily   

 

 valsartan-hydrochlorothiazide 160-12.5 mg oral tablet  2014     TAKE ONE 
TABLET BY MOUTH EVERY DAY   

 

 simvastatin 20 mg oral tablet  2014     TAKE ONE TABLET BY MOUTH EVERY 
DAY IN THE EVENING   

 

 simvastatin 20 mg oral tablet  2014     TAKE ONE TABLET BY MOUTH EVERY 
DAY IN THE EVENING   

 

 valsartan-hydrochlorothiazide 160-12.5 mg oral tablet  2015     TAKE ONE 
TABLET BY MOUTH EVERY DAY   

 

 simvastatin 20 mg oral tablet  2015     TAKE ONE TABLET BY MOUTH ONCE 
DAILY AT BEDTIME   

 

 valsartan-hydrochlorothiazide 160-12.5 mg oral tablet  2015     TAKE ONE 
TABLET BY MOUTH ONCE DAILY   

 

 valsartan-hydrochlorothiazide 160-12.5 mg oral tablet  8/3/2015     TAKE ONE 
TABLET BY MOUTH ONCE DAILY   

 

 simvastatin 20 mg oral tablet  2015     TAKE ONE TABLET BY MOUTH ONCE 
DAILY AT BEDTIME   

 

 amlodipine 10 mg oral tablet  10/18/2015     TAKE ONE TABLET BY MOUTH ONCE 
DAILY   

 

 valsartan-hydrochlorothiazide 160-12.5 mg oral tablet  2016     TAKE ONE 
TABLET BY MOUTH ONCE DAILY   

 

 simvastatin 20 mg oral tablet  2016     TAKE ONE TABLET BY MOUTH ONCE 
DAILY AT BEDTIME   

 

 valsartan-hydrochlorothiazide 160-12.5 mg oral tablet  2016     TAKE ONE 
TABLET BY MOUTH ONCE DAILY   

 

 simvastatin 20 mg oral tablet  2016     TAKE ONE TABLET BY MOUTH ONCE 
DAILY AT BEDTIME   

 

 amlodipine 10 mg oral tablet  10/9/2016     TAKE ONE TABLET BY MOUTH ONCE 
DAILY   

 

 amlodipine 10 mg oral tablet  2017     TAKE ONE TABLET BY MOUTH ONCE DAILY
   

 

 valsartan-hydrochlorothiazide 160-12.5 mg oral tablet  2017     TAKE ONE 
TABLET BY MOUTH ONCE DAILY   

 

 magnesium 250 mg oral tablet        take 1 tablet by oral route daily   

 

 potassium 99 mg oral tablet        take 1 tablet by oral route daily   

 

 ibuprofen 800 mg oral tablet  2018     take 1 tablet (800 mg) by oral 
route 3 times per day with food as needed   

 

 valsartan-hydrochlorothiazide 160-12.5 mg oral tablet  2018  
TAKE ONE TABLET BY MOUTH ONCE DAILY for 90 days   

 

 olmesartan-hydrochlorothiazide 40-12.5 mg oral tablet  2018  
take 1 tablet by oral route once daily for 90 days   









  

 

 Name  Start Date  Expiration Date  SIG  Comments

 

 Augmentin 500-125 mg oral tablet  10/8/2012  10/15/2012  take 1 tablet by oral 
route every 12 hours for 7 days   

 

 Diflucan 100 mg oral tablet  10/8/2012  10/11/2012  take 1 tablet (100 mg) by 
oral route once daily   

 

 amoxicillin 500 mg oral capsule  2013  take 1 capsule (500 mg) 
by oral route 3 times per day for 10 days   

 

 simvastatin 20 mg oral tablet  9/10/2013  10/10/2013  TAKE ONE TABLET BY MOUTH 
EVERY DAY IN THE EVENING   

 

 amlodipine 10 mg oral tablet  2014  TAKE ONE TABLET BY MOUTH 
EVERY DAY   

 

 Augmentin 500-125 mg oral tablet  2015  take 1 tablet by oral 
route every 12 hours for 7 days   

 

 clindamycin HCl 300 mg oral capsule  3/27/2017  4/3/2017  take 1 capsule (300 
mg) by oral route 2 times per day for 7 days   

 

 simvastatin 20 mg oral tablet  2017  TAKE ONE TABLET BY 
MOUTH ONCE DAILY AT BEDTIME   

 

 amlodipine 10 mg oral tablet  2018  TAKE ONE TABLET BY MOUTH 
ONCE DAILY   









 Discontinued 

 

 Name  Start Date  Discontinued Date  SIG  Comments

 

 niacin 500 mg oral tablet extended release 24 hr     2011  take 1 tablets 
(500 mg) by oral route once daily  Starting on Simvastatin

 

 Calcium 600mg     2018  one tablet daily   

 

 Vesicare 5 mg oral tablet  2011  take 1 tablet by oral route 
daily for 30 days   

 

 Osteo Bi-Flex (5-Loxin) 1,500-400-100 mg-unit-mg oral tablet     2013  
take 1 tablet by oral route daily   

 

 Diovan -12.5 mg oral tablet  2012  TAKE ONE TABLET BY 
MOUTH EVERY DAY  generic on list

 

 Medrol (Lei) 4 mg oral tablets,dose pack  2013  take as 
directed   

 

 nystatin-triamcinolone 100,000-0.1 unit/g-% topical cream  2013  apply to affected area(s) by topical route 2 times a day   

 

 fexofenadine 180 mg oral tablet  2013  take 1 tablet (180 mg) 
by oral route once daily   

 

 Osteo Bi-Flex 250-200 mg oral tablet     2018  take 1 tablet by oral route 
daily   

 

 prednisone 20 mg oral tablet  10/26/2015  2016  take 3 tablets (60 mg) by 
oral route once daily for 3 days then 2 tablets (40 mg) daily for 2 days   

 

 cyclobenzaprine 10 mg oral tablet  10/30/2015  2016  take 1 tablet (10 mg
) by oral route 3 times per day   

 

 promethazine-codeine 6.25-10 mg/5 mL oral syrup  3/27/2017  10/12/2017  take 5 
milliliters by oral route every 6 hours as needed, not to exceed 30 mL in 24 
hours   

 

 cyclobenzaprine 10 mg oral tablet  10/12/2017  2018  take 1 tablet (10 mg) 
by oral route at bedtime   







Problem List







 Description  Status  Onset

 

 Arthritis unspecified  Active   

 

 Hyperlipidemia  Active   

 

 Hypertension  Active   







Vital Signs







 Date  Time  BP-Sys(mm[Hg]  BP-Edelmira(mm[Hg])  HR(bpm)  RR(rpm)  Temp  WT  HT  HC  
BMI  BSA  BMI Percentile  O2 Sat(%)

 

 2018  8:02:00 AM  122 mmHg  60 mmHg  74 bpm  22 rpm  98.2 F  366 lbs  64 
in     62.8231 kg/m  2.738 m     97 %

 

 10/12/2017  8:06:00 AM  144 mmHg  82 mmHg  77 bpm  16 rpm  97.7 F  398 lbs  64 
in     68.32 kg/m2  2.86 m2     97 %

 

 3/27/2017  9:55:00 AM  127 mmHg  72 mmHg  76 bpm  22 rpm  98.1 F  393.5 lbs  
63 in     69.7047 kg/m  2.8167 m     97 %

 

 2017  9:36:00 AM  132 mmHg  72 mmHg  76 bpm  20 rpm  97.2 F  396.25 lbs  
64 in     68.02 kg/m2  2.85 m2     97 %

 

 2016  7:58:00 AM  136 mmHg  70 mmHg  85 bpm  20 rpm  98.1 F  391 lbs  64 
in     67.1143 kg/m  2.8299 m     95 %

 

 10/26/2015  8:35:00 AM  124 mmHg  70 mmHg  80 bpm  18 rpm  97.1 F     64 in   
            

 

 2015  9:50:00 AM  132 mmHg  88 mmHg  90 bpm  22 rpm  98.5 F  387 lbs  64 
in     66.43 kg/m2  2.82 m2     97 %

 

 2014  10:36:00 AM  142 mmHg  80 mmHg  78 bpm  22 rpm  97.9 F  381 lbs  64 
in     65.3978 kg/m  2.7935 m     97 %

 

 2013  8:04:00 AM  138 mmHg  70 mmHg  88 bpm  22 rpm  98.1 F  388 lbs  64 
in     66.60 kg/m2  2.82 m2     98 %

 

 2013  9:54:00 AM  136 mmHg  70 mmHg  88 bpm  24 rpm  97.1 F  386 lbs  64 
in     66.2561 kg/m  2.8118 m     97 %

 

 2013  9:47:00 AM  134 mmHg  78 mmHg  70 bpm  16 rpm  97.8 F  381 lbs  64 
in     65.40 kg/m2  2.79 m2      

 

 2013  10:27:00 AM  136 mmHg  84 mmHg  82 bpm  22 rpm  98.2 F  384.2 lbs  
64 in     65.9471 kg/m  2.8052 m     96 %

 

 2012  7:59:00 AM  138 mmHg  72 mmHg  70 bpm  18 rpm  98 F  377 lbs  64 
in     64.71 kg/m2  2.78 m2      

 

 10/8/2012  8:29:00 AM  138 mmHg  70 mmHg  72 bpm  18 rpm  98.1 F  371 lbs  64 
in     63.6814 kg/m  2.7566 m      

 

 2012  10:57:00 AM  136 mmHg  72 mmHg  68 bpm  18 rpm  98 F  371 lbs  64 
in     63.68 kg/m2  2.76 m2      

 

 2011  8:43:00 AM  144 mmHg  78 mmHg  72 bpm  18 rpm  98.2 F  396 lbs  64 
in     67.9725 kg/m  2.848 m      

 

 2010  1:51:00 PM  142 mmHg  80 mmHg  76 bpm  16 rpm  98.7 F  382 lbs  64 
in     65.57 kg/m2  2.80 m2      

 

 2009  8:23:00 AM  132 mmHg  78 mmHg  70 bpm  24 rpm  98.3 F  377 lbs  64 
in     64.7112 kg/m  2.7788 m      







Social History







 Name  Description  Comments

 

 Tobacco  Former smoker  for 2 years

 

       







History of Procedures







 Date Ordered  Description  Order Status

 

 10/26/2015 12:00 AM  X-RAY EXAM OF HIP  Reviewed

 

 10/26/2015 12:00 AM  X-RAY EXAM OF KNEE 3  Reviewed

 

 2011 12:00 AM  COMPREHEN METABOLIC PANEL  Reviewed

 

 2011 12:00 AM  LIPID PANEL  Reviewed

 

 2011 12:00 AM  GLYCOSYLATED HEMOGLOBIN TEST  Reviewed

 

 2011 12:00 AM  CYTOPATH C/V THIN LAYER  Reviewed

 

 2011 12:00 AM  MAMMOGRAM SCREENING  Reviewed

 

 2012 12:00 AM  ASSAY GLUCOSE BLOOD QUANT  Reviewed

 

 10/20/2016 12:00 AM  COMPREHEN METABOLIC PANEL  Reviewed

 

 10/20/2016 12:00 AM  LIPID PANEL  Reviewed

 

 10/20/2016 12:00 AM  GLYCOSYLATED HEMOGLOBIN TEST  Reviewed

 

 10/20/2016 12:00 AM  MICROALBUMIN QUANTITATIVE  Reviewed

 

 10/20/2016 12:00 AM  VITAMIN B-12  Reviewed

 

 2017 12:00 AM  RADEX SHOULDER COMPLETE MINIMUM 2 VIEWS  Reviewed

 

 2017 12:00 AM  RADEX HUMERUS MINIMUM 2 VIEWS  Reviewed

 

 2017 12:00 AM  Decadron 8mg Injection  Reviewed

 

 2017 12:00 AM  Depo-Medrol 80mg Injection  Reviewed

 

 2012 12:00 AM  TDAP VACCINE 7 YRS/> IM  Reviewed

 

 2009 12:00 AM  COMPREHEN METABOLIC PANEL  Reviewed

 

 2009 12:00 AM  LIPID PANEL  Reviewed

 

 2009 12:00 AM  GLYCOSYLATED HEMOGLOBIN TEST  Reviewed

 

 2009 12:00 AM  MAMMOGRAM SCREENING  Reviewed

 

 12/10/2012 12:00 AM  COMPREHEN METABOLIC PANEL  Reviewed

 

 12/10/2012 12:00 AM  LIPID PANEL  Reviewed

 

 12/10/2012 12:00 AM  GLYCOSYLATED HEMOGLOBIN TEST  Reviewed

 

 2012 12:00 AM  MAMMOGRAM SCREENING  Reviewed

 

 2012 12:00 AM  ASSAY GLUCOSE BLOOD QUANT  Reviewed

 

 3/19/2018 12:00 AM  COMPLETE CBC W/AUTO DIFF WBC  Reviewed

 

 3/19/2018 12:00 AM  COMPREHEN METABOLIC PANEL  Reviewed

 

 3/19/2018 12:00 AM  LIPID PANEL  Reviewed

 

 3/19/2018 12:00 AM  GLYCOSYLATED HEMOGLOBIN TEST  Reviewed

 

 2013 12:00 AM  COMPREHEN METABOLIC PANEL  Reviewed

 

 2013 12:00 AM  LIPID PANEL  Reviewed

 

 2013 12:00 AM  GLYCOSYLATED HEMOGLOBIN TEST  Reviewed

 

 2013 12:00 AM  CYTOPATH C/V THIN LAYER  Reviewed

 

 2013 12:00 AM  MAMMOGRAM SCREENING  Reviewed

 

 2009 12:00 AM  CYTOPATH C/V THIN LAYER  Reviewed

 

 2010 12:00 AM  COMPREHEN METABOLIC PANEL  Reviewed

 

 2010 12:00 AM  LIPID PANEL  Reviewed

 

 2010 12:00 AM  GLYCOSYLATED HEMOGLOBIN TEST  Reviewed

 

 2010 12:00 AM  CYTOPATH C/V THIN LAYER  Reviewed

 

 2010 12:00 AM  MAMMOGRAM SCREENING  Reviewed

 

 2014 12:00 AM  COMPREHEN METABOLIC PANEL  Reviewed

 

 2014 12:00 AM  LIPID PANEL  Reviewed

 

 2014 12:00 AM  GLYCOSYLATED HEMOGLOBIN TEST  Reviewed







Results Summary







 Date and Description  Results

 

 2011 9:00 AM  GLUCOSE 130.0 mg/dLSODIUM 139.0 mmol/LPOTASSIUM 4.40 mmol/
LCHLORIDE 104.0 mmol/LCO2 26.0 mmol/LBUN 21.0 mg/dLCREATININE 0.80 mg/dLSGOT/
AST 19.0 IU/LSGPT/ALT 25.0 IU/LALK PHOS 108.0 IU/LTOTAL PROTEIN 7.10 g/
dLALBUMIN 4.20 g/dLTOTAL BILI 0.40 mg/dLCALCIUM 10.20 mg/dLAGE 56 GFR NonAA 74 
GFR AA 90 eGFR >60 mL/min/1.73 m2eGFR AA* >60 TRIGLYCERIDES 227.0 mg/
dLCHOLESTEROL 282.0 mg/dLHDL 45.0 mg/dLTOT CHOL/HDL 6.3 LDL (CALC) 192.0 mg/
dLGLYCOHEMOGLOBIN A1C 6.10 %

 

 2012 9:42 AM  GLUCOSE 131.0 mg/dL

 

 2012 10:02 AM  GLUCOSE 150.0 mg/dLSODIUM 140.0 mmol/LPOTASSIUM 4.60 mmol/
LCHLORIDE 104.0 mmol/LCO2 25.0 mmol/LBUN 21.0 mg/dLCREATININE 0.90 mg/dLSGOT/
AST 19.0 IU/LSGPT/ALT 26.0 IU/LALK PHOS 99.0 IU/LTOTAL PROTEIN 7.10 g/dLALBUMIN 
4.20 g/dLTOTAL BILI 0.50 mg/dLCALCIUM 10.80 mg/dLAGE 57 GFR NonAA 65 GFR AA 79 
eGFR 60 eGFR AA* 60 TRIGLYCERIDES 210.0 mg/dLCHOLESTEROL 241.0 mg/dLHDL 48.0 mg/
dLTOT CHOL/HDL 5.0 LDL (CALC) 151.0 mg/dLGLYCOHEMOGLOBIN A1C 6.30 %

 

 2012 8:54 AM  GLUCOSE 125.0 mg/dL

 

 12/3/2013 8:58 AM  TRIGLYCERIDES 210.0 mg/dLCHOLESTEROL 211.0 mg/dLHDL 49.0 mg/
dLTOT CHOL/HDL 4.3 LDL (CALC) 120.0 mg/dLGLUCOSE 125.0 mg/dLSODIUM 140.0 mmol/
LPOTASSIUM 4.20 mmol/LCHLORIDE 105.0 mmol/LCO2 25.0 mmol/LBUN 24.0 mg/
dLCREATININE 0.90 mg/dLSGOT/AST 23.0 IU/LSGPT/ALT 28.0 IU/LALK PHOS 111.0 IU/
LTOTAL PROTEIN 7.0 g/dLALBUMIN 4.30 g/dLTOTAL BILI 0.60 mg/dLCALCIUM 10.60 mg/
dLAGE 58 GFR NonAA 64 GFR AA 78 eGFR >60 mL/min/1.73 m2eGFR AA* >60 HGB A1C 
6.90 %Est Avg Glucose 151.3 mg/dL

 

 2014 9:05 AM  TRIGLYCERIDES 210.0 mg/dLCHOLESTEROL 204.0 mg/dLHDL 51.0 mg/
dLTOT CHOL/HDL 4.0 LDL (CALC) 111.0 mg/dLGLUCOSE 120.0 mg/dLSODIUM 141.0 mmol/
LPOTASSIUM 4.40 mmol/LCHLORIDE 103.0 mmol/LCO2 25.0 mmol/LBUN 18.0 mg/
dLCREATININE 0.90 mg/dLSGOT/AST 24.0 IU/LSGPT/ALT 29.0 IU/LALK PHOS 98.0 IU/
LTOTAL PROTEIN 7.0 g/dLALBUMIN 4.10 g/dLTOTAL BILI 0.60 mg/dLCALCIUM 10.20 mg/
dLAGE 59 GFR NonAA 64 GFR AA 78 eGFR 60 eGFR AA* 60 HGB A1C 6.80 %Est Avg 
Glucose 148.5 mg/dL

 

 10/24/2016 10:22 AM  HGB A1C 6.50 %Est Avg Glucose 139.9 mg/dLGLUCOSE 129.0 mg/
dLSODIUM 143.0 mmol/LPOTASSIUM 4.40 mmol/LCHLORIDE 104.0 mmol/LCO2 24.0 mmol/
LBUN 25.0 mg/dLCREATININE 0.90 mg/dLSGOT/AST 25.0 IU/LSGPT/ALT 35.0 IU/LALK 
PHOS 96.0 IU/LTOTAL PROTEIN 7.10 g/dLALBUMIN 4.20 g/dLTOTAL BILI 0.50 mg/
dLCALCIUM 10.30 mg/dLAGE 61 GFR NonAA 64 GFR AA 78 eGFR >60 mL/min/1.73meGFR 
AA* >60 TRIGLYCERIDES 181.0 mg/dLCHOLESTEROL 201.0 mg/dLHDL 47.0 mg/dLTOT CHOL/
HDL 4.3 LDL (CALC) 118.0 mg/dLMICROALBUMIN UR <0.5 ug/mLVITAMIN B12 485.0 pg/mL

 

 3/24/2018 1:00 PM  GLUCOSE 123.0 mg/dLSODIUM 140.0 mmol/LPOTASSIUM 4.20 mmol/
LCHLORIDE 104.0 mmol/LCO2 26.0 mmol/LBUN 30.0 mg/dLCREATININE 1.10 mg/dLSGOT/
AST 23.0 IU/LSGPT/ALT 28.0 IU/LALK PHOS 93.0 IU/LTOTAL PROTEIN 7.60 g/dLALBUMIN 
4.30 g/dLTOTAL BILI 0.50 mg/dLCALCIUM 11.20 mg/dLAGE 63 GFR NonAA 50 GFR AA 61 
eGFR 50 eGFR AA* >60 WBC 8.3 RBC 4.33 HGB 13.20 g/dLHCT 40.30 %MCV 93.0 fLMCH 
30.50 pgMCHC 32.80 g/dLRDW SD 44 RDW CV 12.80 %MPV 10.30 fLPLT 300 NRBC# 0.00 
NRBC% 0.0 %NEUT 66.90 %%LYMP 23.50 %%MONO 7.60 %%EOS 1.60 %%BASO 0.20 %#NEUT 
5.54 #LYMP 1.95 #MONO 0.63 #EOS 0.13 #BASO 0.02 MANUAL DIFF NOT IND HGB A1C 
5.80 %Est Avg Glucose 119.8 mg/dLTRIGLYCERIDES 154.0 mg/dLCHOLESTEROL 186.0 mg/
dLHDL 46.0 mg/dLTOT CHOL/HDL 4.0 LDL (CALC) 109.0 mg/dL







History Of Immunizations







 Name  Date Admin  Mfg Name  Mfg Code  Trade Name  Lot#  Route  Inj  Vis Given  
Vis Pub  CVX

 

 Tdap  2012  CITTIOine  SKB  ADACEL  [id number]  Intramuscular  Right 
Deltoid  2008  115

 

 Influenza  2015  Not Entered  NE  AFLURIA     Not Entered  Not Entered  1  141

 

 Influenza  2016  Not Entered  NE  AFLURIA     Not Entered  Not Entered  1  141







History of Past Illness







 Name  Date of Onset  Comments

 

 Hypertension  Dec  7 2009  1:45PM   

 

 Glucose Intolerance  Dec  7 2009  1:45PM   

 

 Hyperlipidemia  Dec  7 2009  1:45PM   

 

 Screening Mammogram  Dec  7 2009  1:45PM   

 

 Routine gynecological examination  Dec 21 2009  8:25AM   

 

 Hypertension Stable  Dec 21 2009  8:25AM   

 

 Hyperlipidemia, unspecified Stable  Dec 21 2009  8:25AM   

 

 Arthritis unspecified      

 

 Hyperlipidemia      

 

 Hypertension      

 

 Hypertension  Dec 17 2010  1:41PM   

 

 Glucose Intolerance  Dec 17 2010  1:41PM   

 

 Hyperlipidemia  Dec 17 2010  1:41PM   

 

 Hyperglycemia  Dec 17 2010  1:41PM   

 

 Routine gynecological examination  Dec 20 2010  1:52PM   

 

 Overactive bladder  Dec 20 2010  1:52PM   

 

 Hypertension  2011 12:05PM   

 

 Glucose Intolerance  2011 12:05PM   

 

 Hyperlipidemia  2011 12:05PM   

 

 Hyperglycemia  2011 12:05PM   

 

 Routine gynecological examination  Dec  8 2011  8:45AM   

 

 Overactive bladder  Dec  8 2011  8:45AM   

 

 Screening Examination for Breast Cancer  Dec  8 2011  8:45AM   

 

 Hyperglycemia  Dec  8 2011  8:45AM   

 

 Laceration 2nd digit right hand  May 21 2012 10:59AM   

 

 Acute Pharyngitis  Oct  8 2012  8:30AM   

 

 Hypertension  Dec 10 2012 11:59AM   

 

 Glucose Intolerance  Dec 10 2012 11:59AM   

 

 Hyperlipidemia  Dec 10 2012 11:59AM   

 

 Hyperglycemia  Dec 10 2012 11:59AM   

 

 Screening Examination for Breast Cancer  Dec 19 2012  8:02AM   

 

 Hypertension  Dec 19 2012  8:02AM   

 

 Hyperlipidemia, Mixed  Dec 19 2012  8:02AM   

 

 Hyperglycemia  Dec 19 2012  8:02AM   

 

 Upper Respiratory Infections  May 23 2013 10:32AM   

 

 Eczema  Aug 14 2013  9:49AM   

 

 Eustachian Tube Dysfunction, Bilateral  Aug 14 2013  9:49AM   

 

 Worried well  Sep 16 2013  9:56AM   

 

 Hypertension  Dec  2 2013 11:57AM   

 

 Glucose Intolerance  Dec  2 2013 11:57AM   

 

 Hyperlipidemia  Dec  2 2013 11:57AM   

 

 Hyperglycemia  Dec  2 2013 11:57AM   

 

 Routine gynecological examination  Dec 17 2013  8:06AM   

 

 Screening Examination for Breast Cancer  Dec 17 2013  8:06AM   

 

 Hypertension  2014 11:58AM   

 

 Glucose Intolerance  2014 11:58AM   

 

 Hyperlipidemia  2014 11:58AM   

 

 Hyperglycemia  2014 11:58AM   

 

 Essential Hypertension  2014 10:39AM   

 

 Diabetes Mellitus, Type II  2014 10:39AM   

 

 Hyperlipidemia, Mixed  2014 10:39AM   

 

 Sinusitis, Acute  Sep 14 2015  9:52AM   

 

 Bronchitis, Acute  Sep 14 2015  9:52AM   

 

 Arthritis unspecified  Oct 26 2015  8:37AM   

 

 Hypertension  Oct 20 2016  9:16AM   

 

 Diabetes Mellitus, Type II  Oct 20 2016  9:16AM   

 

 Hyperlipidemia  Oct 20 2016  9:16AM   

 

 Routine gynecological examination  2016  8:11AM   

 

 Screening Examination for Breast Cancer  2016  8:11AM   

 

 Visit for screening mammogram  2016  8:11AM   

 

 Hyperlipidemia  2016  8:11AM   

 

 Hypertension  2016  8:11AM   

 

 Controlled diabetes mellitus type II without complication  2016  8:11AM
   

 

 Pain in joint of right shoulder  2017  9:41AM   

 

 Acute bronchitis  Mar 27 2017  9:57AM   

 

 Acute non-recurrent maxillary sinusitis  Mar 27 2017  9:57AM   

 

 Hyperlipidemia  Oct 12 2017  8:08AM   

 

 Morbid Obesity  Oct 12 2017  8:08AM   

 

 Hypertension  Oct 12 2017  8:08AM   

 

 Hypertension  Mar 19 2018 10:08AM   

 

 Hyperlipemia  Mar 19 2018 10:08AM   

 

 Morbid obesity  Mar 19 2018 10:08AM   

 

 Family history of diabetes mellitus  Mar 19 2018 10:08AM   

 

 Hyperlipidemia  2018  8:05AM   

 

 Morbid Obesity  2018  8:05AM   

 

 Hypertension  2018  8:05AM   







Payers







 Insurance Name  Company Name  Plan Name  Plan Number  Policy Number  Policy 
Group Number  Start Date

 

    Medicare RHC Medicare RHC     [id number]     2008

 

    Medicare Part B  [employer] [place]     [id number]     2008

 

    Medicare Part A  Medicare Part A     [id number]     2008

 

    Medicare Part A  Medicare - Lab/Xray     [id number]     2008







History of Encounters







 Visit Date  Visit Type  Provider

 

 2018  Office visit  Brain Hauser DO

 

 10/12/2017  Office visit  Brain Hauser DO

 

 3/27/2017  Office visit  Brain Hauser DO

 

 2017  Office visit  Brain Hauser DO

 

 2016  Office visit  Brain Hauser DO

 

 10/26/2015  Office visit  Brain Hauser DO

 

 2015  Office visit  Brain Hauser DO

 

 2014  Office visit  Brain Hauser DO

 

 2013  Office visit  Brain Hauser DO

 

 2013  Office visit  Brain Murtaza DO

 

 2013  Office visit  Brain Hauser DO

 

 2013  Office visit  Margie Zurita APRN

 

 2012  Office visit  Brain Hauser DO

 

 10/8/2012  Office visit  Brain Hauser DO

 

 2012  Office visit  Brain Hauser DO

 

 2011  Office visit  Brain Hauser DO

 

 2010  Office visit  Brain Hauser DO

 

 2009  Office visit  Brain Shafferte DO

 

 2009  Office visit  Brain Hauser DO

## 2019-02-11 NOTE — ED ABDOMINAL PAIN
General


Chief Complaint:  Abdominal/GI Problems


Stated Complaint:  RT SIDE PAIN,NAUSEA,VOMITTING


Nursing Triage Note:  


PT REPORTS ABDOMINAL PAIN THAT HAS BEEN DULL FOR THE LAST WEEK BUT IS NOW 

SHARP. 


PT WENT TO URGENT CARE AND HAD KUB AT HOSPITAL. WHEN LEAVING PT BEGAN VOMITING 


FROM THE SEVERE PAIN.


Sepsis Screen:  No Definite Risk


Source of Information:  Patient, Family


Exam Limitations:  No Limitations





History of Present Illness


Date Seen by Provider:  2019


Time Seen by Provider:  16:18


Initial Comments


Patient presents to ER by private conveyance with chief complaint that she has 

been having some dull achy abdominal pain in her right lower quadrant for the 

past 5 days. It has been pretty consistent but progressively getting worse to 

where today after she went to urgent care they got a urinalysis which was 

normal and Center for a x-ray of the abdomen. While she was getting up and off 

the table she felt a sharp pain in her right lower quadrant which nearly made 

her fall. She's occasionally had some nausea but no rash right now. No fevers 

chills cough or other prodromal syndromes. She's had her gallbladder out 

surgically but no other surgeries. She has not taken anything for the pain. She 

has not seen anybody for the nausea or symptoms in the last week. She does have 

a history of chronic constipation and uses Colace couple times a day to stay 

regular. Her last bowel movement was yesterday and she did not have to strain.





Allergies and Home Medications


Allergies


Coded Allergies:  


     No Known Drug Allergies (Unverified , 19)





Patient Home Medication List


Home Medication List Reviewed:  Yes





Review of Systems


Review of Systems


Constitutional:  No chills, No fever


EENTM:  No Blurred Vision, No Double Vision


Respiratory:  Denies Cough, Denies Shortness of Air, Denies Wheezing


Cardiovascular:  Denies Chest Pain; Edema (chronic)


Gastrointestinal:  See HPI; Denies Abdomen Distended; Abdominal Pain, 

Constipated; Denies Diarrhea; Nausea; Denies Vomiting


Genitourinary:  Denies Burning, Denies Discharge


Musculoskeletal:  No back pain, No joint pain


Skin:  No pruritus, No rash





Past Medical-Social-Family Hx


Patient Social History


Alcohol Use:  Denies Use


Recreational Drug Use:  No


Smoking Status:  Never a Smoker


Recent Foreign Travel:  No


Contact w/Someone Who Travel:  No


Recent Infectious Disease Expo:  No


Recent Hopitalizations:  No





Seasonal Allergies


Seasonal Allergies:  No





Past Medical History


Surgeries:  Yes


Gallbladder


Respiratory:  No


Cardiac:  Yes


High Cholesterol, Hypertension


Neurological:  No


Genitourinary:  No


Gastrointestinal:  No


Musculoskeletal:  No


Endocrine:  No


HEENT:  Yes


Glaucoma


Cancer:  No


Psychosocial:  No


Integumentary:  No


Blood Disorders:  No





Physical Exam


Vital Signs





Vital Signs - First Documented








 19





 16:10


 


Temp 96.0


 


Pulse 56


 


Resp 16


 


B/P (MAP) 142/65 (90)


 


Pulse Ox 94





Capillary Refill : Less Than 3 Seconds


Height/Weight/BMI


Height: 5'3.00"


Weight: 334lbs. oz. 151.970906zp;  BMI


Method:Stated


General Appearance:  WD/WN, obese (morbidly)


HEENT:  PERRL/EOMI, TMs normal, pharynx normal


Neck:  non-tender, normal inspection


Respiratory:  chest non-tender, lungs clear, normal breath sounds, no 

respiratory distress, no accessory muscle use


Cardiovascular:  normal peripheral pulses, regular rate, rhythm, other (chronic 

bilateral lower extremity edema)


Peripheral Pulses:  2+ Radial Pulses (R), 2+ Radial Pulses (L)


Gastrointestinal:  soft, no organomegaly, abnormal bowel sounds (in audible or 

decreased), tenderness (over McBurney's point but no rebound tenderness or 

other mesenteric signs. Difficult to do a good examination secondary to body 

habitus)


Extremities:  normal range of motion, normal inspection, normal capillary refill


Neurologic/Psychiatric:  normal mood/affect, oriented x 3, other (somnolent, 

GCS 14)


Skin:  normal color, warm/dry





Progress/Results/Core Measures


Results/Orders


Lab Results





Laboratory Tests








Test


 19


16:20 19


17:47 Range/Units


 


 


White Blood Count


 8.8 


 


 4.3-11.0


10^3/uL


 


Red Blood Count


 4.36 


 


 4.35-5.85


10^6/uL


 


Hemoglobin 13.2   11.5-16.0  G/DL


 


Hematocrit 39   35-52  %


 


Mean Corpuscular Volume 90   80-99  FL


 


Mean Corpuscular Hemoglobin 30   25-34  PG


 


Mean Corpuscular Hemoglobin


Concent 34 


 


 32-36  G/DL





 


Red Cell Distribution Width 13.0   10.0-14.5  %


 


Platelet Count


 359 


 


 130-400


10^3/uL


 


Mean Platelet Volume 10.1   7.4-10.4  FL


 


Neutrophils (%) (Auto) 62   42-75  %


 


Lymphocytes (%) (Auto) 28   12-44  %


 


Monocytes (%) (Auto) 7   0-12  %


 


Eosinophils (%) (Auto) 2   0-10  %


 


Basophils (%) (Auto) 0   0-10  %


 


Neutrophils # (Auto) 5.5   1.8-7.8  X 10^3


 


Lymphocytes # (Auto) 2.5   1.0-4.0  X 10^3


 


Monocytes # (Auto) 0.6   0.0-1.0  X 10^3


 


Eosinophils # (Auto)


 0.2 


 


 0.0-0.3


10^3/uL


 


Basophils # (Auto)


 0.0 


 


 0.0-0.1


10^3/uL


 


Sodium Level 140   135-145  MMOL/L


 


Potassium Level 4.1   3.6-5.0  MMOL/L


 


Chloride Level 106     MMOL/L


 


Carbon Dioxide Level 22   21-32  MMOL/L


 


Anion Gap 12   5-14  MMOL/L


 


Blood Urea Nitrogen 31 H  7-18  MG/DL


 


Creatinine


 1.18 


 


 0.60-1.30


MG/DL


 


Estimat Glomerular Filtration


Rate 46 


 


  





 


BUN/Creatinine Ratio 26    


 


Glucose Level 154 H    MG/DL


 


Calcium Level 10.6 H  8.5-10.1  MG/DL


 


Corrected Calcium 10.4 H  8.5-10.1  MG/DL


 


Total Bilirubin 0.5   0.1-1.0  MG/DL


 


Aspartate Amino Transf


(AST/SGOT) 19 


 


 5-34  U/L





 


Alanine Aminotransferase


(ALT/SGPT) 22 


 


 0-55  U/L





 


Alkaline Phosphatase 96     U/L


 


C-Reactive Protein High


Sensitivity 0.39 


 


 0.00-0.50


MG/DL


 


Total Protein 7.1   6.4-8.2  GM/DL


 


Albumin 4.2   3.2-4.5  GM/DL


 


Urine Color  YELLOW   


 


Urine Clarity  CLEAR   


 


Urine pH  5  5-9  


 


Urine Specific Gravity  1.020  1.016-1.022  


 


Urine Protein  NEGATIVE  NEGATIVE  


 


Urine Glucose (UA)  NEGATIVE  NEGATIVE  


 


Urine Ketones  1+ H NEGATIVE  


 


Urine Nitrite  NEGATIVE  NEGATIVE  


 


Urine Bilirubin  NEGATIVE  NEGATIVE  


 


Urine Urobilinogen  NORMAL  NORMAL  MG/DL


 


Urine Leukocyte Esterase  NEGATIVE  NEGATIVE  


 


Urine RBC (Auto)  NEGATIVE  NEGATIVE  


 


Urine RBC  0-2   /HPF


 


Urine WBC  2-5   /HPF


 


Urine Squamous Epithelial


Cells 


 2-5 


  /HPF





 


Urine Crystals  NONE   /LPF


 


Urine Bacteria  LARGE H  /HPF


 


Urine Casts  NONE   /LPF


 


Urine Mucus  NEGATIVE   /LPF


 


Urine Culture Indicated  YES   








My Orders





Orders - KAIA JUAREZ


Ct Abdomen/Pelvis W (19 16:24)


Cbc With Automated Diff (19 16:24)


Comprehensive Metabolic Panel (19 16:24)


Hs C Reactive Protein (19 16:24)


Ua Culture If Indicated (19 16:24)


Saline Lock/Iv-Start (19 16:24)


Ns Iv 1000 Ml (Sodium Chloride 0.9%) (19 16:24)


Ketorolac Injection (Toradol Injection) (19 16:30)


Iohexol Injection (Omnipaque 350 Mg/Ml 1 (19 17:00)


Contrast Received (Contrast Received) (19 17:00)


Ns (Ivpb) (Sodium Chloride 0.9% Ivpb Bag (19 17:00)


Urine Culture (19 17:47)





Medications Given in ED





Current Medications








 Medications  Dose


 Ordered  Sig/Javier


 Route  Start Time


 Stop Time Status Last Admin


Dose Admin


 


 Iohexol  100 ml  ONCE  ONCE


 IV  19 17:00


 19 17:01 DC 19 17:02


100 ML


 


 Ketorolac


 Tromethamine  30 mg  ONCE  ONCE


 IVP  19 16:30


 19 16:31 DC 19 16:38


30 MG


 


 Sodium Chloride  100 ml  ONCE  ONCE


 IV  19 17:00


 19 17:01 DC 19 17:02


80 ML








Vital Signs/I&O











 19





 16:10


 


Temp 96.0


 


Pulse 56


 


Resp 16


 


B/P (MAP) 142/65 (90)


 


Pulse Ox 94














Blood Pressure Mean:  90











Progress


Progress Note #1:  


   Time:  16:35


Progress Note


Patient has obesity hypoventilation syndrome appearance. Her oxygen sats did 

slip down into the low 90s as she slept but her heart rate stayed in the 50s to 

60s range.


Progress Note #2:  


   Time:  18:18


Progress Note


The patient's pain has significant decreased. She is still feeling poorly but 

having no nausea. Her urine, labs, CT are all unremarkable. I suggested some 

MiraLAX cleanout and follow-up with primary care later this week. Her family 

member will be able look in on her throughout the week.





Diagnostic Imaging





   Diagonstic Imaging:  Xray


   Plain Films/CT/US/NM/MRI:  abdomen (kub)


Comments


No overt air-fluid levels or areas transition/obstruction.





NAME:      YOHANNES TAY


MED REC#:   [id number]


ACCOUNT#:   [id number]


PHYSICIAN:    MARIN MACKENZIE


 








CC:   JERRY FORBES MD; MARIN MACKENZIE


 Page 1 of 1


 RADIOLOGY REPORT





 [facility], [place], [place]





CC:   JERRY FORBES MD; MARIN MACKENZIE


 Page 1 of 1


 RADIOLOGY REPORT





NAME:      YOHANNES TAY


MED REC#:   [id number]


ACCOUNT#:   [id number]


PT STATUS:   REG CLI


:      1955


PHYSICIAN:    MARIN MACKENZIE


ADMIT DATE:   19/RAD


 ***Signed***


Date of Exam:   19





ABDOMEN, FLAT & UPRIGHT/DECUB


 





INDICATION: Right flank pain





EXAM: Supine upright abdominal images





FINDINGS:  


The gallbladder is surgically absent. Bowel gas pattern is


normal. There are no pathologic masses or calcifications.





IMPRESSION:


No acute abnormalities are seen in the abdomen.





Dictated by: 





  Dictated on workstation # [id number]





[id number]





Dict:      19 1637


Trans:      19 164





Interpreted by:         JERRY FORBES MD


Electronically signed by:   JERRY FORBES MD 19 164


   Reviewed:  Reviewed by Me








   Diagonstic Imaging:  CT (with contrast)


   Plain Films/CT/US/NM/MRI:  abdomen, pelvis


Comments


 [facility], [facility].


 [place], [place]





NAME:   YOHANNES TAY


MED REC#:   [id number]


ACCOUNT#:   [id number]


PT STATUS:   REG ER


:   1955


PHYSICIAN:   KAIA JUAREZ MD


ADMIT DATE:   19/ER


 ***Draft***


Date of Exam:19





CT ABDOMEN/PELVIS W








PROCEDURE: CT abdomen and pelvis with contrast.





TECHNIQUE: Multiple contiguous axial images were obtained through


the abdomen and pelvis after administration of intravenous


contrast. 





DATE: 2019.





COMPARISON: Abdominal radiographs 2019. 





INDICATION: 63-year-old female, right lower quadrant abdominal


pain and nausea.





FINDINGS: 


The visualized portions of the lung bases are grossly clear. The


heart is not enlarged. There is no identified pericardial


effusion.





The liver is normal in size and contour. There is no identified


liver lesion. The main, right, and left portal veins are patent.


The gallbladder is surgically absent. There is no intrahepatic or


extrahepatic bile duct dilation. The main pancreatic duct is not


abnormally dilated. There is fatty replacement of the pancreatic


parenchyma. The spleen is not enlarged. There is a nonspecific


low-attenuation lesion in the spleen on axial image 28 which


measures 8 mm in size. The adrenal glands are unremarkable.





Unremarkable appearance of the renal parenchyma. The urinary


collecting systems are not distended. There is no identified


renal or ureteral stone. The urinary bladder is under distended


and grossly unremarkable in appearance.





There is diverticulosis without evidence of acute diverticulitis.


The intestinal tract is not distended. The appendix is best seen


on axial image 79 and adjacent sequential images. The appendix is


normal. There is no identified free intraperitoneal air. There is


no focal fluid collection. There is no free pelvic fluid.





There are atherosclerotic calcifications. There is no identified


abnormally enlarged lymph node in the abdomen or pelvis which


meets CT size criteria for adenopathy.





There are arthritic changes of both hips. There are advanced


multilevel degenerative changes of the spine. There is grade 1-2


anterolisthesis of L5 on S1 relating to bilateral L5 pars


interarticularis defects. There is no identified acute bony


abnormality.





IMPRESSION: 


CT ABDOMEN AND PELVIS.


1. No evidence of acute appendicitis or other acute abnormality


in the abdomen or pelvis.

















  Dictated on workstation # [id number]








Dict:   19 1724


Trans:   19 1734


[facility] [id number]





Interpreted by:     PORTILLO BOOGIE MD


Electronically signed by:


   Reviewed:  Reviewed by Me





Departure


Impression





 Primary Impression:  


 Abdominal wall pain


 Additional Impressions:  


 Constipation


 Qualified Codes:  K59.01 - Slow transit constipation


 Nausea alone


Disposition:   HOME, SELF-CARE


Condition:  Stable





Departure-Patient Inst.


Decision time for Depature:  18:19


Referrals:  


VAN LOPEZ DO (PCP/Family)


Primary Care Physician


Patient Instructions:  Constipation, Adult (DC)





Add. Discharge Instructions:  


Obtain some MiraLAX and use it once or twice a day to help move your bowels 

along. 


Follow up later this week with primary care. 


If you have nausea take Zofran 1 tablet every 6 hours as needed.


If necessary can use an enema daily for one or 2 days in a row.


If you have pain you can use Tylenol 1000 mg every 8 hours in addition to 

ibuprofen 800 mg every 8 hours and heating pads. 


If your pain becomes severe, intractable or you have intractable nausea with 

vomiting despite the medicines then you should follow-up sooner.


All discharge instructions reviewed with patient and/or family. Voiced 

understanding.


Scripts


Ondansetron (Ondansetron Odt) 4 Mg Tab.rapdis


4 MG PO Q6H PRN for NAUSEA/VOMITING, #8 TAB 0 Refills


   Prov: KAIA JUAREZ         19





Copy


Copies To 1:   VAN LOPEZ DO











KAIA JUAREZ 2019 16:34

## 2019-02-11 NOTE — XMS REPORT
MU2 Ambulatory Summary

 Created on: 2015



Elena Deluca

External Reference #: [id number]

: 1955

Sex: Female



Demographics







 Address  [address]

[place]  [address]

 

 Home Phone  [phone]

 

 Preferred Language  English

 

 Marital Status  

 

 Synagogue Affiliation  Unknown

 

 Race  White

 

 Ethnic Group  Not  or 





Author







 Author  Brain Hauser

 

 [facility]

 

 Address  [address]

[place], [place]  [id number]



 

 Phone  [phone]







Care Team Providers







 Care Team Member Name  Role  Phone

 

 Brain Hauser  PCP  Unavailable







Allergies and Adverse Reactions







 Name  Reaction  Notes

 

 NO KNOWN DRUG ALLERGIES      







Plan of Treatment

Not available.



Medications







 Active 

 

 Name  Start Date  Estimated Completion Date  SIG  Comments

 

 Calcium 600mg        one tablet daily   

 

 Multivitamin Oral Tablet        take 1 tablet by oral route daily   

 

 aspirin 81 mg oral tablet        take 1 tablet (81 mg) by oral route once 
daily   

 

 Fish Oil 1,000 mg oral capsule  2010     take 2 capsules by oral route 
daily   

 

 valsartan-hydrochlorothiazide 160-12.5 mg oral tablet  2014     TAKE ONE 
TABLET BY MOUTH EVERY DAY   

 

 simvastatin 20 mg oral tablet  2014     TAKE ONE TABLET BY MOUTH EVERY 
DAY IN THE EVENING   

 

 amlodipine 10 mg oral tablet  2014  TAKE ONE TABLET BY MOUTH 
EVERY DAY   

 

 Osteo Bi-Flex 250-200 mg oral tablet        take 1 tablet by oral route daily 
  

 

 simvastatin 20 mg oral tablet  2014     TAKE ONE TABLET BY MOUTH EVERY 
DAY IN THE EVENING   

 

 valsartan-hydrochlorothiazide 160-12.5 mg oral tablet  2015     TAKE ONE 
TABLET BY MOUTH EVERY DAY   

 

 simvastatin 20 mg oral tablet  2015     TAKE ONE TABLET BY MOUTH ONCE 
DAILY AT BEDTIME   

 

 valsartan-hydrochlorothiazide 160-12.5 mg oral tablet  2015     TAKE ONE 
TABLET BY MOUTH ONCE DAILY   

 

 valsartan-hydrochlorothiazide 160-12.5 mg oral tablet  8/3/2015     TAKE ONE 
TABLET BY MOUTH ONCE DAILY   

 

 simvastatin 20 mg oral tablet  2015     TAKE ONE TABLET BY MOUTH ONCE 
DAILY AT BEDTIME   

 

 amlodipine 10 mg oral tablet  10/18/2015     TAKE ONE TABLET BY MOUTH ONCE 
DAILY   

 

 prednisone 20 mg oral tablet  10/26/2015     take 3 tablets (60 mg) by oral 
route once daily for 3 days then 2 tablets (40 mg) daily for 2 days   

 

 cyclobenzaprine 10 mg oral tablet  10/30/2015     take 1 tablet (10 mg) by 
oral route 3 times per day   









  

 

 Name  Start Date  Expiration Date  SIG  Comments

 

 Augmentin 500-125 mg oral tablet  10/8/2012  10/15/2012  take 1 tablet by oral 
route every 12 hours for 7 days   

 

 Diflucan 100 mg oral tablet  10/8/2012  10/11/2012  take 1 tablet (100 mg) by 
oral route once daily   

 

 amoxicillin 500 mg oral capsule  2013  take 1 capsule (500 mg) 
by oral route 3 times per day for 10 days   

 

 simvastatin 20 mg oral tablet  9/10/2013  10/10/2013  TAKE ONE TABLET BY MOUTH 
EVERY DAY IN THE EVENING   

 

 Augmentin 500-125 mg oral tablet  2015  take 1 tablet by oral 
route every 12 hours for 7 days   









 Discontinued 

 

 Name  Start Date  Discontinued Date  SIG  Comments

 

 niacin 500 mg oral tablet extended release 24 hr     2011  take 1 tablets 
(500 mg) by oral route once daily  Starting on Simvastatin

 

 Vesicare 5 mg oral tablet  2011  take 1 tablet by oral route 
daily for 30 days   

 

 Osteo Bi-Flex (5-Loxin) 1,500-400-100 mg-unit-mg oral tablet     2013  
take 1 tablet by oral route daily   

 

 Diovan -12.5 mg oral tablet  2012  TAKE ONE TABLET BY 
MOUTH EVERY DAY  generic on list

 

 Medrol (Lei) 4 mg oral tablets,dose pack  2013  take as 
directed   

 

 nystatin-triamcinolone 100,000-0.1 unit/g-% topical cream  2013  apply to affected area(s) by topical route 2 times a day   

 

 fexofenadine 180 mg oral tablet  2013  take 1 tablet (180 mg) 
by oral route once daily   







Problem List







 Description  Status  Onset

 

 Arthritis unspecified  Active   

 

 Hyperlipidemia  Active   

 

 Hypertension  Active   







Vital Signs







 Date  Time  BP-Sys(mm[Hg]  BP-Edelmira(mm[Hg])  HR(bpm)  RR(rpm)  Temp  WT  HT  HC  
BMI  BSA  BMI Percentile  O2 Sat(%)

 

 10/26/2015  8:35:00 AM  124 mmHg  70 mmHg  80 bpm  18 rpm  97.1 F     64 in   
            

 

 2015  9:50:00 AM  132 mmHg  88 mmHg  90 bpm  22 rpm  98.5 F  387 lbs  64 
in     66.4277 kg/m  2.8154 m     97 %

 

 2014  10:36:00 AM  142 mmHg  80 mmHg  78 bpm  22 rpm  97.9 F  381 lbs  64 
in     65.40 kg/m2  2.79 m2     97 %

 

 2013  8:04:00 AM  138 mmHg  70 mmHg  88 bpm  22 rpm  98.1 F  388 lbs  64 
in     66.5994 kg/m  2.8191 m     98 %

 

 2013  9:54:00 AM  136 mmHg  70 mmHg  88 bpm  24 rpm  97.1 F  386 lbs  64 
in     66.26 kg/m2  2.81 m2     97 %

 

 2013  9:47:00 AM  134 mmHg  78 mmHg  70 bpm  16 rpm  97.8 F  381 lbs  64 
in     65.3978 kg/m  2.7935 m      

 

 2013  10:27:00 AM  136 mmHg  84 mmHg  82 bpm  22 rpm  98.2 F  384.2 lbs  
64 in     65.95 kg/m2  2.81 m2     96 %

 

 2012  7:59:00 AM  138 mmHg  72 mmHg  70 bpm  18 rpm  98 F  377 lbs  64 
in     64.7112 kg/m  2.7788 m      

 

 10/8/2012  8:29:00 AM  138 mmHg  70 mmHg  72 bpm  18 rpm  98.1 F  371 lbs  64 
in     63.68 kg/m2  2.76 m2      

 

 2012  10:57:00 AM  136 mmHg  72 mmHg  68 bpm  18 rpm  98 F  371 lbs  64 
in     63.6814 kg/m  2.7566 m      

 

 2011  8:43:00 AM  144 mmHg  78 mmHg  72 bpm  18 rpm  98.2 F  396 lbs  64 
in     67.97 kg/m2  2.85 m2      

 

 2010  1:51:00 PM  142 mmHg  80 mmHg  76 bpm  16 rpm  98.7 F  382 lbs  64 
in     65.5695 kg/m  2.7972 m      

 

 2009  8:23:00 AM  132 mmHg  78 mmHg  70 bpm  24 rpm  98.3 F  377 lbs  64 
in     64.71 kg/m2  2.78 m2      







Social History







 Name  Description  Comments

 

 Tobacco  Former smoker  for 2 years

 

       







History of Procedures







 Date Ordered  Description  Order Status

 

 10/26/2015 12:00 AM  X-RAY EXAM OF HIP  Reviewed

 

 10/26/2015 12:00 AM  X-RAY EXAM OF KNEE 3  Reviewed

 

 2011 12:00 AM  COMPREHEN METABOLIC PANEL  Reviewed

 

 2011 12:00 AM  LIPID PANEL  Reviewed

 

 2011 12:00 AM  GLYCOSYLATED HEMOGLOBIN TEST  Reviewed

 

 2011 12:00 AM  CYTOPATH C/V THIN LAYER  Reviewed

 

 2011 12:00 AM  MAMMOGRAM SCREENING  Reviewed

 

 2012 12:00 AM  ASSAY GLUCOSE BLOOD QUANT  Reviewed

 

 2012 12:00 AM  TDAP VACCINE 7 YRS/> IM  Reviewed

 

 2009 12:00 AM  COMPREHEN METABOLIC PANEL  Reviewed

 

 2009 12:00 AM  LIPID PANEL  Reviewed

 

 2009 12:00 AM  GLYCOSYLATED HEMOGLOBIN TEST  Reviewed

 

 2009 12:00 AM  MAMMOGRAM SCREENING  Reviewed

 

 12/10/2012 12:00 AM  COMPREHEN METABOLIC PANEL  Reviewed

 

 12/10/2012 12:00 AM  LIPID PANEL  Reviewed

 

 12/10/2012 12:00 AM  GLYCOSYLATED HEMOGLOBIN TEST  Reviewed

 

 2012 12:00 AM  MAMMOGRAM SCREENING  Reviewed

 

 2012 12:00 AM  ASSAY GLUCOSE BLOOD QUANT  Reviewed

 

 2013 12:00 AM  COMPREHEN METABOLIC PANEL  Reviewed

 

 2013 12:00 AM  LIPID PANEL  Reviewed

 

 2013 12:00 AM  GLYCOSYLATED HEMOGLOBIN TEST  Reviewed

 

 2013 12:00 AM  CYTOPATH C/V THIN LAYER  Reviewed

 

 2013 12:00 AM  MAMMOGRAM SCREENING  Reviewed

 

 2009 12:00 AM  CYTOPATH C/V THIN LAYER  Reviewed

 

 2010 12:00 AM  COMPREHEN METABOLIC PANEL  Reviewed

 

 2010 12:00 AM  LIPID PANEL  Reviewed

 

 2010 12:00 AM  GLYCOSYLATED HEMOGLOBIN TEST  Reviewed

 

 2010 12:00 AM  CYTOPATH C/V THIN LAYER  Reviewed

 

 2010 12:00 AM  MAMMOGRAM SCREENING  Reviewed

 

 2014 12:00 AM  COMPREHEN METABOLIC PANEL  Reviewed

 

 2014 12:00 AM  LIPID PANEL  Reviewed

 

 2014 12:00 AM  GLYCOSYLATED HEMOGLOBIN TEST  Reviewed







Results Summary







 Data and Description  Results

 

 2009 10:01 AM  .0 mg/dLCHOLESTEROL 258 mg/dLTRIGLYCERIDES 252.0 mg
/dLHDL 51.0 mg/dLLDL 153.0 mg/dLTRIGLYCERIDES 252.0 mg/dLHDL 51.0 mg/
dLCHOLESTEROL 258 mg/dLGLUCOSE 116 SODIUM 144.0 mmol/LPOTASSIUM 4.80 mmol/
LCHLORIDE 105.0 mmol/LCO2 24.0 mmol/LBUN 18.0 mg/dLCREATININE 0.80 mg/dLSGOT/
AST 23.0 IU/LSGPT/ALT 26.0 IU/LALK PHOS 98.0 IU/LTOTAL PROTEIN 7.30 g/dLALBUMIN 
4.20 g/dLTOTAL BILI 0.40 mg/dLCALCIUM 10.30 mg/dLeGFR >60 mL/minGLUCOSE 116 
SODIUM 144.0 mmol/LPOTASSIUM 4.80 mmol/LCHLORIDE 105.0 mmol/LCO2 24.0 mmol/LBUN 
18.0 mg/dLCREATININE 0.80 mg/dLSGOT/AST 23.0 IU/LSGPT/ALT 26.0 IU/LALK PHOS 
98.0 IU/LTOTAL PROTEIN 7.30 g/dLALBUMIN 4.20 g/dLTOTAL BILI 0.40 mg/dLCALCIUM 
10.30 mg/dLeGFR >60 mL/min

 

 2011 9:00 AM  GLUCOSE 130.0 mg/dLSODIUM 139.0 mmol/LPOTASSIUM 4.40 mmol/
LCHLORIDE 104.0 mmol/LCO2 26.0 mmol/LBUN 21.0 mg/dLCREATININE 0.80 mg/dLSGOT/
AST 19.0 IU/LSGPT/ALT 25.0 IU/LALK PHOS 108.0 IU/LTOTAL PROTEIN 7.10 g/
dLALBUMIN 4.20 g/dLTOTAL BILI 0.40 mg/dLCALCIUM 10.20 mg/dLeGFR >60 mL/min/1.73 
[id number] 227.0 mg/dLCHOLESTEROL 282.0 mg/dLHDL 45.0 mg/dLLDL (CALC) 
192.0 mg/dL

 

 2012 9:42 AM  GLUCOSE 131.0 mg/dL

 

 2012 10:02 AM  GLUCOSE 150.0 mg/dLSODIUM 140.0 mmol/LPOTASSIUM 4.60 mmol/
LCHLORIDE 104.0 mmol/LCO2 25.0 mmol/LBUN 21.0 mg/dLCREATININE 0.90 mg/dLSGOT/
AST 19.0 IU/LSGPT/ALT 26.0 IU/LALK PHOS 99.0 IU/LTOTAL PROTEIN 7.10 g/dLALBUMIN 
4.20 g/dLTOTAL BILI 0.50 mg/dLCALCIUM 10.80 mg/dLeGFR 60 TRIGLYCERIDES 210.0 mg/
dLCHOLESTEROL 241.0 mg/dLHDL 48.0 mg/dLLDL (CALC) 151.0 mg/dL

 

 2012 8:54 AM  GLUCOSE 125.0 mg/dL

 

 12/3/2013 8:58 AM  TRIGLYCERIDES 210.0 mg/dLCHOLESTEROL 211.0 mg/dLHDL 49.0 mg/
dLLDL (CALC) 120.0 mg/dLGLUCOSE 125.0 mg/dLSODIUM 140.0 mmol/LPOTASSIUM 4.20 
mmol/LCHLORIDE 105.0 mmol/LCO2 25.0 mmol/LBUN 24.0 mg/dLCREATININE 0.90 mg/
dLSGOT/AST 23.0 IU/LSGPT/ALT 28.0 IU/LALK PHOS 111.0 IU/LTOTAL PROTEIN 7.0 g/
dLALBUMIN 4.30 g/dLTOTAL BILI 0.60 mg/dLCALCIUM 10.60 mg/dLeGFR >60 mL/min/1.73 
m2Est Avg Glucose 151.3 mg/dL

 

 2014 9:05 AM  TRIGLYCERIDES 210.0 mg/dLCHOLESTEROL 204.0 mg/dLHDL 51.0 mg/
dLLDL (CALC) 111.0 mg/dLGLUCOSE 120.0 mg/dLSODIUM 141.0 mmol/LPOTASSIUM 4.40 
mmol/LCHLORIDE 103.0 mmol/LCO2 25.0 mmol/LBUN 18.0 mg/dLCREATININE 0.90 mg/
dLSGOT/AST 24.0 IU/LSGPT/ALT 29.0 IU/LALK PHOS 98.0 IU/LTOTAL PROTEIN 7.0 g/
dLALBUMIN 4.10 g/dLTOTAL BILI 0.60 mg/dLCALCIUM 10.20 mg/dLeGFR 60 Est Avg 
Glucose 148.5 mg/dL







History Of Immunizations







 Name  Date Admin  Mfg Name  Mfg Code  Trade Name  Lot#  Route  Inj  Vis Given  
Vis Pub  CVX

 

 Tdap  2012  [email or web address]  SKB  ADACEL  [id number]  Intramuscular  Right 
Deltoid  2008  115







History of Past Illness







 Name  Date of Onset  Comments

 

 Hypertension  Dec  7 2009  1:45PM   

 

 Glucose Intolerance  Dec  7 2009  1:45PM   

 

 Hyperlipidemia  Dec  7 2009  1:45PM   

 

 Screening Mammogram  Dec  7 2009  1:45PM   

 

 Routine gynecological examination  Dec 21 2009  8:25AM   

 

 Hypertension Stable  Dec 21 2009  8:25AM   

 

 Hyperlipidemia, unspecified Stable  Dec 21 2009  8:25AM   

 

 Arthritis unspecified      

 

 Hyperlipidemia      

 

 Hypertension      

 

 Hypertension  Dec 17 2010  1:41PM   

 

 Glucose Intolerance  Dec 17 2010  1:41PM   

 

 Hyperlipidemia  Dec 17 2010  1:41PM   

 

 Hyperglycemia  Dec 17 2010  1:41PM   

 

 Routine gynecological examination  Dec 20 2010  1:52PM   

 

 Overactive bladder  Dec 20 2010  1:52PM   

 

 Hypertension  2011 12:05PM   

 

 Glucose Intolerance  2011 12:05PM   

 

 Hyperlipidemia  2011 12:05PM   

 

 Hyperglycemia  2011 12:05PM   

 

 Routine gynecological examination  Dec  8 2011  8:45AM   

 

 Overactive bladder  Dec  8 2011  8:45AM   

 

 Screening Examination for Breast Cancer  Dec  8 2011  8:45AM   

 

 Hyperglycemia  Dec  8 2011  8:45AM   

 

 Laceration 2nd digit right hand  May 21 2012 10:59AM   

 

 Acute Pharyngitis  Oct  8 2012  8:30AM   

 

 Hypertension  Dec 10 2012 11:59AM   

 

 Glucose Intolerance  Dec 10 2012 11:59AM   

 

 Hyperlipidemia  Dec 10 2012 11:59AM   

 

 Hyperglycemia  Dec 10 2012 11:59AM   

 

 Screening Examination for Breast Cancer  Dec 19 2012  8:02AM   

 

 Hypertension  Dec 19 2012  8:02AM   

 

 Hyperlipidemia, Mixed  Dec 19 2012  8:02AM   

 

 Hyperglycemia  Dec 19 2012  8:02AM   

 

 Upper Respiratory Infections  May 23 2013 10:32AM   

 

 Eczema  Aug 14 2013  9:49AM   

 

 Eustachian Tube Dysfunction, Bilateral  Aug 14 2013  9:49AM   

 

 Worried well  Sep 16 2013  9:56AM   

 

 Hypertension  Dec  2 2013 11:57AM   

 

 Glucose Intolerance  Dec  2 2013 11:57AM   

 

 Hyperlipidemia  Dec  2 2013 11:57AM   

 

 Hyperglycemia  Dec  2 2013 11:57AM   

 

 Routine gynecological examination  Dec 17 2013  8:06AM   

 

 Screening Examination for Breast Cancer  Dec 17 2013  8:06AM   

 

 Hypertension  2014 11:58AM   

 

 Glucose Intolerance  2014 11:58AM   

 

 Hyperlipidemia  2014 11:58AM   

 

 Hyperglycemia  2014 11:58AM   

 

 Essential Hypertension  2014 10:39AM   

 

 Diabetes Mellitus, Type II  2014 10:39AM   

 

 Hyperlipidemia, Mixed  2014 10:39AM   

 

 Sinusitis, Acute  Sep 14 2015  9:52AM   

 

 Bronchitis, Acute  Sep 14 2015  9:52AM   

 

 Arthritis unspecified  Oct 26 2015  8:37AM   







Payers







 Insurance Name  Company Name  Plan Name  Plan Number  Policy Number  Policy 
Group Number  Start Date

 

    Medicare Part A  Medicare Part A     [id number]     2008

 

    Medicare Part B  [employer] [place]     [id number]     2008







History of Encounters







 Visit Date  Visit Type  Provider

 

 10/26/2015  Office visit  Brain Hauser DO

 

 2015  Office visit  Brain Hauser DO

 

 2014  Office visit  Brain Hauser DO

 

 2013  Office visit  Brain Hauser DO

 

 2013  Office visit  Brain Hauser DO

 

 2013  Office visit  Brain Hauser DO

 

 2013  Office visit  Margie RODAS

 

 2012  Office visit  Brain Hauser DO

 

 10/8/2012  Office visit  Brain Hauser DO

 

 2012  Office visit  Brain Hauser DO

 

 2011  Office visit  Brain Hauser DO

 

 2010  Office visit  Brain Hauser DO

 

 2009  Office visit  Brain Hauser DO

 

 2009  Office visit  Brain Hauser DO

## 2019-02-11 NOTE — XMS REPORT
MU2 Ambulatory Summary

 Created on: 10/20/2016



Elena Deluca

External Reference #: [id number]

: 1955

Sex: Female



Demographics







 Address  [address]

[place]  [address]

 

 Home Phone  [phone]

 

 Preferred Language  English

 

 Marital Status  

 

 Shinto Affiliation  Unknown

 

 Race  White

 

 Ethnic Group  Not  or 





Author







 Author  Brain Hauser

 

 [facility]

 

 Address  [address]

[place], [place]  [id number]



 

 Phone  [phone]







Care Team Providers







 Care Team Member Name  Role  Phone

 

 Brain Hauser  PCP  Unavailable







Allergies and Adverse Reactions







 Name  Reaction  Notes

 

 NO KNOWN DRUG ALLERGIES      







Plan of Treatment







 Planned Activity  Comments  Planned Date  Planned Time  Plan/Goal

 

 COMPREHEN METABOLIC PANEL     10/20/2016  12:00 AM   

 

 LIPID PANEL     10/20/2016  12:00 AM   

 

 GLYCOSYLATED HEMOGLOBIN TEST     10/20/2016  12:00 AM   

 

 MICROALBUMIN QUANTITATIVE     10/20/2016  12:00 AM   

 

 VITAMIN B-12     10/20/2016  12:00 AM   







Medications







 Active 

 

 Name  Start Date  Estimated Completion Date  SIG  Comments

 

 Calcium 600mg        one tablet daily   

 

 Multivitamin Oral Tablet        take 1 tablet by oral route daily   

 

 aspirin 81 mg oral tablet        take 1 tablet (81 mg) by oral route once 
daily   

 

 Fish Oil 1,000 mg oral capsule  2010     take 2 capsules by oral route 
daily   

 

 valsartan-hydrochlorothiazide 160-12.5 mg oral tablet  2014     TAKE ONE 
TABLET BY MOUTH EVERY DAY   

 

 simvastatin 20 mg oral tablet  2014     TAKE ONE TABLET BY MOUTH EVERY 
DAY IN THE EVENING   

 

 Osteo Bi-Flex 250-200 mg oral tablet        take 1 tablet by oral route daily 
  

 

 simvastatin 20 mg oral tablet  2014     TAKE ONE TABLET BY MOUTH EVERY 
DAY IN THE EVENING   

 

 valsartan-hydrochlorothiazide 160-12.5 mg oral tablet  2015     TAKE ONE 
TABLET BY MOUTH EVERY DAY   

 

 simvastatin 20 mg oral tablet  2015     TAKE ONE TABLET BY MOUTH ONCE 
DAILY AT BEDTIME   

 

 valsartan-hydrochlorothiazide 160-12.5 mg oral tablet  2015     TAKE ONE 
TABLET BY MOUTH ONCE DAILY   

 

 valsartan-hydrochlorothiazide 160-12.5 mg oral tablet  8/3/2015     TAKE ONE 
TABLET BY MOUTH ONCE DAILY   

 

 simvastatin 20 mg oral tablet  2015     TAKE ONE TABLET BY MOUTH ONCE 
DAILY AT BEDTIME   

 

 amlodipine 10 mg oral tablet  10/18/2015     TAKE ONE TABLET BY MOUTH ONCE 
DAILY   

 

 prednisone 20 mg oral tablet  10/26/2015     take 3 tablets (60 mg) by oral 
route once daily for 3 days then 2 tablets (40 mg) daily for 2 days   

 

 cyclobenzaprine 10 mg oral tablet  10/30/2015     take 1 tablet (10 mg) by 
oral route 3 times per day   

 

 valsartan-hydrochlorothiazide 160-12.5 mg oral tablet  2016     TAKE ONE 
TABLET BY MOUTH ONCE DAILY   

 

 simvastatin 20 mg oral tablet  2016     TAKE ONE TABLET BY MOUTH ONCE 
DAILY AT BEDTIME   

 

 valsartan-hydrochlorothiazide 160-12.5 mg oral tablet  2016     TAKE ONE 
TABLET BY MOUTH ONCE DAILY   

 

 simvastatin 20 mg oral tablet  2016     TAKE ONE TABLET BY MOUTH ONCE 
DAILY AT BEDTIME   

 

 amlodipine 10 mg oral tablet  10/9/2016     TAKE ONE TABLET BY MOUTH ONCE 
DAILY   









  

 

 Name  Start Date  Expiration Date  SIG  Comments

 

 Augmentin 500-125 mg oral tablet  10/8/2012  10/15/2012  take 1 tablet by oral 
route every 12 hours for 7 days   

 

 Diflucan 100 mg oral tablet  10/8/2012  10/11/2012  take 1 tablet (100 mg) by 
oral route once daily   

 

 amoxicillin 500 mg oral capsule  2013  take 1 capsule (500 mg) 
by oral route 3 times per day for 10 days   

 

 simvastatin 20 mg oral tablet  9/10/2013  10/10/2013  TAKE ONE TABLET BY MOUTH 
EVERY DAY IN THE EVENING   

 

 amlodipine 10 mg oral tablet  2014  TAKE ONE TABLET BY MOUTH 
EVERY DAY   

 

 Augmentin 500-125 mg oral tablet  2015  take 1 tablet by oral 
route every 12 hours for 7 days   









 Discontinued 

 

 Name  Start Date  Discontinued Date  SIG  Comments

 

 niacin 500 mg oral tablet extended release 24 hr     2011  take 1 tablets 
(500 mg) by oral route once daily  Starting on Simvastatin

 

 Vesicare 5 mg oral tablet  2011  take 1 tablet by oral route 
daily for 30 days   

 

 Osteo Bi-Flex (5-Loxin) 1,500-400-100 mg-unit-mg oral tablet     2013  
take 1 tablet by oral route daily   

 

 Diovan -12.5 mg oral tablet  2012  TAKE ONE TABLET BY 
MOUTH EVERY DAY  generic on list

 

 Medrol (Lei) 4 mg oral tablets,dose pack  2013  2013  take as 
directed   

 

 nystatin-triamcinolone 100,000-0.1 unit/g-% topical cream  2013  apply to affected area(s) by topical route 2 times a day   

 

 fexofenadine 180 mg oral tablet  2013  take 1 tablet (180 mg) 
by oral route once daily   







Problem List







 Description  Status  Onset

 

 Arthritis unspecified  Active   

 

 Hyperlipidemia  Active   

 

 Hypertension  Active   







Vital Signs







 Date  Time  BP-Sys(mm[Hg]  BP-Edelmira(mm[Hg])  HR(bpm)  RR(rpm)  Temp  WT  HT  HC  
BMI  BSA  BMI Percentile  O2 Sat(%)

 

 10/26/2015  8:35:00 AM  124 mmHg  70 mmHg  80 bpm  18 rpm  97.1 F     64 in   
            

 

 2015  9:50:00 AM  132 mmHg  88 mmHg  90 bpm  22 rpm  98.5 F  387 lbs  64 
in     66.4277 kg/m  2.8154 m     97 %

 

 2014  10:36:00 AM  142 mmHg  80 mmHg  78 bpm  22 rpm  97.9 F  381 lbs  64 
in     65.40 kg/m2  2.79 m2     97 %

 

 2013  8:04:00 AM  138 mmHg  70 mmHg  88 bpm  22 rpm  98.1 F  388 lbs  64 
in     66.5994 kg/m  2.8191 m     98 %

 

 2013  9:54:00 AM  136 mmHg  70 mmHg  88 bpm  24 rpm  97.1 F  386 lbs  64 
in     66.26 kg/m2  2.81 m2     97 %

 

 2013  9:47:00 AM  134 mmHg  78 mmHg  70 bpm  16 rpm  97.8 F  381 lbs  64 
in     65.3978 kg/m  2.7935 m      

 

 2013  10:27:00 AM  136 mmHg  84 mmHg  82 bpm  22 rpm  98.2 F  384.2 lbs  
64 in     65.95 kg/m2  2.81 m2     96 %

 

 2012  7:59:00 AM  138 mmHg  72 mmHg  70 bpm  18 rpm  98 F  377 lbs  64 
in     64.7112 kg/m  2.7788 m      

 

 10/8/2012  8:29:00 AM  138 mmHg  70 mmHg  72 bpm  18 rpm  98.1 F  371 lbs  64 
in     63.68 kg/m2  2.76 m2      

 

 2012  10:57:00 AM  136 mmHg  72 mmHg  68 bpm  18 rpm  98 F  371 lbs  64 
in     63.6814 kg/m  2.7566 m      

 

 2011  8:43:00 AM  144 mmHg  78 mmHg  72 bpm  18 rpm  98.2 F  396 lbs  64 
in     67.97 kg/m2  2.85 m2      

 

 2010  1:51:00 PM  142 mmHg  80 mmHg  76 bpm  16 rpm  98.7 F  382 lbs  64 
in     65.5695 kg/m  2.7972 m      

 

 2009  8:23:00 AM  132 mmHg  78 mmHg  70 bpm  24 rpm  98.3 F  377 lbs  64 
in     64.71 kg/m2  2.78 m2      







Social History







 Name  Description  Comments

 

 Tobacco  Former smoker  for 2 years

 

       







History of Procedures







 Date Ordered  Description  Order Status

 

 10/26/2015 12:00 AM  X-RAY EXAM OF HIP  Reviewed

 

 10/26/2015 12:00 AM  X-RAY EXAM OF KNEE 3  Reviewed

 

 2011 12:00 AM  COMPREHEN METABOLIC PANEL  Reviewed

 

 2011 12:00 AM  LIPID PANEL  Reviewed

 

 2011 12:00 AM  GLYCOSYLATED HEMOGLOBIN TEST  Reviewed

 

 2011 12:00 AM  CYTOPATH C/V THIN LAYER  Reviewed

 

 2011 12:00 AM  MAMMOGRAM SCREENING  Reviewed

 

 2012 12:00 AM  ASSAY GLUCOSE BLOOD QUANT  Reviewed

 

 2012 12:00 AM  TDAP VACCINE 7 YRS/> IM  Reviewed

 

 2009 12:00 AM  COMPREHEN METABOLIC PANEL  Reviewed

 

 2009 12:00 AM  LIPID PANEL  Reviewed

 

 2009 12:00 AM  GLYCOSYLATED HEMOGLOBIN TEST  Reviewed

 

 2009 12:00 AM  MAMMOGRAM SCREENING  Reviewed

 

 12/10/2012 12:00 AM  COMPREHEN METABOLIC PANEL  Reviewed

 

 12/10/2012 12:00 AM  LIPID PANEL  Reviewed

 

 12/10/2012 12:00 AM  GLYCOSYLATED HEMOGLOBIN TEST  Reviewed

 

 2012 12:00 AM  MAMMOGRAM SCREENING  Reviewed

 

 2012 12:00 AM  ASSAY GLUCOSE BLOOD QUANT  Reviewed

 

 2013 12:00 AM  COMPREHEN METABOLIC PANEL  Reviewed

 

 2013 12:00 AM  LIPID PANEL  Reviewed

 

 2013 12:00 AM  GLYCOSYLATED HEMOGLOBIN TEST  Reviewed

 

 2013 12:00 AM  CYTOPATH C/V THIN LAYER  Reviewed

 

 2013 12:00 AM  MAMMOGRAM SCREENING  Reviewed

 

 2009 12:00 AM  CYTOPATH C/V THIN LAYER  Reviewed

 

 2010 12:00 AM  COMPREHEN METABOLIC PANEL  Reviewed

 

 2010 12:00 AM  LIPID PANEL  Reviewed

 

 2010 12:00 AM  GLYCOSYLATED HEMOGLOBIN TEST  Reviewed

 

 2010 12:00 AM  CYTOPATH C/V THIN LAYER  Reviewed

 

 2010 12:00 AM  MAMMOGRAM SCREENING  Reviewed

 

 2014 12:00 AM  COMPREHEN METABOLIC PANEL  Reviewed

 

 2014 12:00 AM  LIPID PANEL  Reviewed

 

 2014 12:00 AM  GLYCOSYLATED HEMOGLOBIN TEST  Reviewed







Results Summary







 Data and Description  Results

 

 2009 10:01 AM  .0 mg/dLCHOLESTEROL 258 mg/dLTRIGLYCERIDES 252.0 mg
/dLHDL 51.0 mg/dLLDL 153.0 mg/dLTRIGLYCERIDES 252.0 mg/dLHDL 51.0 mg/
dLCHOLESTEROL 258 mg/dLGLUCOSE 116 SODIUM 144.0 mmol/LPOTASSIUM 4.80 mmol/
LCHLORIDE 105.0 mmol/LCO2 24.0 mmol/LBUN 18.0 mg/dLCREATININE 0.80 mg/dLSGOT/
AST 23.0 IU/LSGPT/ALT 26.0 IU/LALK PHOS 98.0 IU/LTOTAL PROTEIN 7.30 g/dLALBUMIN 
4.20 g/dLTOTAL BILI 0.40 mg/dLCALCIUM 10.30 mg/dLeGFR >60 mL/minGLUCOSE 116 
SODIUM 144.0 mmol/LPOTASSIUM 4.80 mmol/LCHLORIDE 105.0 mmol/LCO2 24.0 mmol/LBUN 
18.0 mg/dLCREATININE 0.80 mg/dLSGOT/AST 23.0 IU/LSGPT/ALT 26.0 IU/LALK PHOS 
98.0 IU/LTOTAL PROTEIN 7.30 g/dLALBUMIN 4.20 g/dLTOTAL BILI 0.40 mg/dLCALCIUM 
10.30 mg/dLeGFR >60 mL/min

 

 2011 9:00 AM  GLUCOSE 130.0 mg/dLSODIUM 139.0 mmol/LPOTASSIUM 4.40 mmol/
LCHLORIDE 104.0 mmol/LCO2 26.0 mmol/LBUN 21.0 mg/dLCREATININE 0.80 mg/dLSGOT/
AST 19.0 IU/LSGPT/ALT 25.0 IU/LALK PHOS 108.0 IU/LTOTAL PROTEIN 7.10 g/
dLALBUMIN 4.20 g/dLTOTAL BILI 0.40 mg/dLCALCIUM 10.20 mg/dLeGFR >60 mL/min/1.73 
[id number] 227.0 mg/dLCHOLESTEROL 282.0 mg/dLHDL 45.0 mg/dLLDL (CALC) 
192.0 mg/dL

 

 2012 9:42 AM  GLUCOSE 131.0 mg/dL

 

 2012 10:02 AM  GLUCOSE 150.0 mg/dLSODIUM 140.0 mmol/LPOTASSIUM 4.60 mmol/
LCHLORIDE 104.0 mmol/LCO2 25.0 mmol/LBUN 21.0 mg/dLCREATININE 0.90 mg/dLSGOT/
AST 19.0 IU/LSGPT/ALT 26.0 IU/LALK PHOS 99.0 IU/LTOTAL PROTEIN 7.10 g/dLALBUMIN 
4.20 g/dLTOTAL BILI 0.50 mg/dLCALCIUM 10.80 mg/dLeGFR 60 TRIGLYCERIDES 210.0 mg/
dLCHOLESTEROL 241.0 mg/dLHDL 48.0 mg/dLLDL (CALC) 151.0 mg/dL

 

 2012 8:54 AM  GLUCOSE 125.0 mg/dL

 

 12/3/2013 8:58 AM  TRIGLYCERIDES 210.0 mg/dLCHOLESTEROL 211.0 mg/dLHDL 49.0 mg/
dLLDL (CALC) 120.0 mg/dLGLUCOSE 125.0 mg/dLSODIUM 140.0 mmol/LPOTASSIUM 4.20 
mmol/LCHLORIDE 105.0 mmol/LCO2 25.0 mmol/LBUN 24.0 mg/dLCREATININE 0.90 mg/
dLSGOT/AST 23.0 IU/LSGPT/ALT 28.0 IU/LALK PHOS 111.0 IU/LTOTAL PROTEIN 7.0 g/
dLALBUMIN 4.30 g/dLTOTAL BILI 0.60 mg/dLCALCIUM 10.60 mg/dLeGFR >60 mL/min/1.73 
m2Est Avg Glucose 151.3 mg/dL

 

 2014 9:05 AM  TRIGLYCERIDES 210.0 mg/dLCHOLESTEROL 204.0 mg/dLHDL 51.0 mg/
dLLDL (CALC) 111.0 mg/dLGLUCOSE 120.0 mg/dLSODIUM 141.0 mmol/LPOTASSIUM 4.40 
mmol/LCHLORIDE 103.0 mmol/LCO2 25.0 mmol/LBUN 18.0 mg/dLCREATININE 0.90 mg/
dLSGOT/AST 24.0 IU/LSGPT/ALT 29.0 IU/LALK PHOS 98.0 IU/LTOTAL PROTEIN 7.0 g/
dLALBUMIN 4.10 g/dLTOTAL BILI 0.60 mg/dLCALCIUM 10.20 mg/dLeGFR 60 Est Avg 
Glucose 148.5 mg/dL







History Of Immunizations







 Name  Date Admin  Mfg Name  Mfg Code  Trade Name  Lot#  Route  Inj  Vis Given  
Vis Pub  CVX

 

 Tdap  2012  Starteedine  SKB  ADACEL  [id number]  Intramuscular  Right 
Deltoid  2008  115

 

 Influenza  2015  Not Entered  NE  Afluria     Not Entered  Not Entered  1  141







History of Past Illness







 Name  Date of Onset  Comments

 

 Hypertension  Dec  7 2009  1:45PM   

 

 Glucose Intolerance  Dec  7 2009  1:45PM   

 

 Hyperlipidemia  Dec  7 2009  1:45PM   

 

 Screening Mammogram  Dec  7 2009  1:45PM   

 

 Routine gynecological examination  Dec 21 2009  8:25AM   

 

 Hypertension Stable  Dec 21 2009  8:25AM   

 

 Hyperlipidemia, unspecified Stable  Dec 21 2009  8:25AM   

 

 Arthritis unspecified      

 

 Hyperlipidemia      

 

 Hypertension      

 

 Hypertension  Dec 17 2010  1:41PM   

 

 Glucose Intolerance  Dec 17 2010  1:41PM   

 

 Hyperlipidemia  Dec 17 2010  1:41PM   

 

 Hyperglycemia  Dec 17 2010  1:41PM   

 

 Routine gynecological examination  Dec 20 2010  1:52PM   

 

 Overactive bladder  Dec 20 2010  1:52PM   

 

 Hypertension  2011 12:05PM   

 

 Glucose Intolerance  2011 12:05PM   

 

 Hyperlipidemia  2011 12:05PM   

 

 Hyperglycemia  2011 12:05PM   

 

 Routine gynecological examination  Dec  8 2011  8:45AM   

 

 Overactive bladder  Dec  8 2011  8:45AM   

 

 Screening Examination for Breast Cancer  Dec  8 2011  8:45AM   

 

 Hyperglycemia  Dec  8 2011  8:45AM   

 

 Laceration 2nd digit right hand  May 21 2012 10:59AM   

 

 Acute Pharyngitis  Oct  8 2012  8:30AM   

 

 Hypertension  Dec 10 2012 11:59AM   

 

 Glucose Intolerance  Dec 10 2012 11:59AM   

 

 Hyperlipidemia  Dec 10 2012 11:59AM   

 

 Hyperglycemia  Dec 10 2012 11:59AM   

 

 Screening Examination for Breast Cancer  Dec 19 2012  8:02AM   

 

 Hypertension  Dec 19 2012  8:02AM   

 

 Hyperlipidemia, Mixed  Dec 19 2012  8:02AM   

 

 Hyperglycemia  Dec 19 2012  8:02AM   

 

 Upper Respiratory Infections  May 23 2013 10:32AM   

 

 Eczema  Aug 14 2013  9:49AM   

 

 Eustachian Tube Dysfunction, Bilateral  Aug 14 2013  9:49AM   

 

 Worried well  Sep 16 2013  9:56AM   

 

 Hypertension  Dec  2 2013 11:57AM   

 

 Glucose Intolerance  Dec  2 2013 11:57AM   

 

 Hyperlipidemia  Dec  2 2013 11:57AM   

 

 Hyperglycemia  Dec  2 2013 11:57AM   

 

 Routine gynecological examination  Dec 17 2013  8:06AM   

 

 Screening Examination for Breast Cancer  Dec 17 2013  8:06AM   

 

 Hypertension  2014 11:58AM   

 

 Glucose Intolerance  2014 11:58AM   

 

 Hyperlipidemia  2014 11:58AM   

 

 Hyperglycemia  2014 11:58AM   

 

 Essential Hypertension  2014 10:39AM   

 

 Diabetes Mellitus, Type II  2014 10:39AM   

 

 Hyperlipidemia, Mixed  2014 10:39AM   

 

 Sinusitis, Acute  Sep 14 2015  9:52AM   

 

 Bronchitis, Acute  Sep 14 2015  9:52AM   

 

 Arthritis unspecified  Oct 26 2015  8:37AM   

 

 Hypertension  Oct 20 2016  9:16AM   

 

 Diabetes Mellitus, Type II  Oct 20 2016  9:16AM   

 

 Hyperlipidemia  Oct 20 2016  9:16AM   







Payers







 Insurance Name  Company Name  Plan Name  Plan Number  Policy Number  Policy 
Group Number  Start Date

 

    Medicare Part A  Medicare Part A     [id number]     2008

 

    Medicare Part A  Medicare - Lab/Xray     [id number]     2008

 

    Medicare Part B  [employer] [place]     [id number]     2008







History of Encounters







 Visit Date  Visit Type  Provider

 

 10/26/2015  Office visit  Brain Hauser DO

 

 2015  Office visit  Brain Hauser DO

 

 2014  Office visit  Brain Hauser DO

 

 2013  Office visit  Brain Hauser DO

 

 2013  Office visit  Brain Hauser DO

 

 2013  Office visit  Brain Hauser DO

 

 2013  Office visit  Margie Zurita APRWALLY

 

 2012  Office visit  Brain Hauser DO

 

 10/8/2012  Office visit  Barin Hauser DO

 

 2012  Office visit  Brain Hauser DO

 

 2011  Office visit  Brain Hauser DO

 

 2010  Office visit  Brain Hauser DO

 

 2009  Office visit  Brain Hauser DO

 

 2009  Office visit  Brain Hauser DO

## 2019-02-11 NOTE — XMS REPORT
MU2 Ambulatory Summary

 Created on: 10/27/2015



Elena Deluca

External Reference #: [id number]

: 1955

Sex: Female



Demographics







 Address  [address]

[place]  [address]

 

 Home Phone  [phone]

 

 Preferred Language  English

 

 Marital Status  

 

 Zoroastrian Affiliation  Unknown

 

 Race  White

 

 Ethnic Group  Not  or 





Author







 Author  Brain Hauser

 

 [facility]

 

 Address  [address]

[place], [place]  [id number]



 

 Phone  [phone]







Care Team Providers







 Care Team Member Name  Role  Phone

 

 Brain Hauser  PCP  Unavailable







Allergies and Adverse Reactions







 Name  Reaction  Notes

 

 NO KNOWN DRUG ALLERGIES      







Plan of Treatment







 Planned Activity  Comments  Planned Date  Planned Time  Plan/Goal

 

 X-RAY EXAM OF KNEE 3     10/26/2015  12:00 AM   







Medications







 Active 

 

 Name  Start Date  Estimated Completion Date  SIG  Comments

 

 Calcium 600mg        one tablet daily   

 

 Multivitamin Oral Tablet        take 1 tablet by oral route daily   

 

 aspirin 81 mg oral tablet        take 1 tablet (81 mg) by oral route once 
daily   

 

 Fish Oil 1,000 mg oral capsule  2010     take 2 capsules by oral route 
daily   

 

 valsartan-hydrochlorothiazide 160-12.5 mg oral tablet  2014     TAKE ONE 
TABLET BY MOUTH EVERY DAY   

 

 simvastatin 20 mg oral tablet  2014     TAKE ONE TABLET BY MOUTH EVERY 
DAY IN THE EVENING   

 

 amlodipine 10 mg oral tablet  2014  TAKE ONE TABLET BY MOUTH 
EVERY DAY   

 

 Osteo Bi-Flex 250-200 mg oral tablet        take 1 tablet by oral route daily 
  

 

 simvastatin 20 mg oral tablet  2014     TAKE ONE TABLET BY MOUTH EVERY 
DAY IN THE EVENING   

 

 valsartan-hydrochlorothiazide 160-12.5 mg oral tablet  2015     TAKE ONE 
TABLET BY MOUTH EVERY DAY   

 

 simvastatin 20 mg oral tablet  2015     TAKE ONE TABLET BY MOUTH ONCE 
DAILY AT BEDTIME   

 

 valsartan-hydrochlorothiazide 160-12.5 mg oral tablet  2015     TAKE ONE 
TABLET BY MOUTH ONCE DAILY   

 

 valsartan-hydrochlorothiazide 160-12.5 mg oral tablet  8/3/2015     TAKE ONE 
TABLET BY MOUTH ONCE DAILY   

 

 simvastatin 20 mg oral tablet  2015     TAKE ONE TABLET BY MOUTH ONCE 
DAILY AT BEDTIME   

 

 amlodipine 10 mg oral tablet  10/18/2015     TAKE ONE TABLET BY MOUTH ONCE 
DAILY   

 

 cyclobenzaprine 10 mg oral tablet  10/26/2015     take 1 tablet (10 mg) by 
oral route 3 times per day   

 

 prednisone 20 mg oral tablet  10/26/2015     take 3 tablets (60 mg) by oral 
route once daily for 3 days then 2 tablets (40 mg) daily for 2 days   









  

 

 Name  Start Date  Expiration Date  SIG  Comments

 

 Augmentin 500-125 mg oral tablet  10/8/2012  10/15/2012  take 1 tablet by oral 
route every 12 hours for 7 days   

 

 Diflucan 100 mg oral tablet  10/8/2012  10/11/2012  take 1 tablet (100 mg) by 
oral route once daily   

 

 amoxicillin 500 mg oral capsule  2013  take 1 capsule (500 mg) 
by oral route 3 times per day for 10 days   

 

 simvastatin 20 mg oral tablet  9/10/2013  10/10/2013  TAKE ONE TABLET BY MOUTH 
EVERY DAY IN THE EVENING   

 

 Augmentin 500-125 mg oral tablet  2015  take 1 tablet by oral 
route every 12 hours for 7 days   









 Discontinued 

 

 Name  Start Date  Discontinued Date  SIG  Comments

 

 niacin 500 mg oral tablet extended release 24 hr     2011  take 1 tablets 
(500 mg) by oral route once daily  Starting on Simvastatin

 

 Vesicare 5 mg oral tablet  2011  take 1 tablet by oral route 
daily for 30 days   

 

 Osteo Bi-Flex (5-Loxin) 1,500-400-100 mg-unit-mg oral tablet     2013  
take 1 tablet by oral route daily   

 

 Diovan -12.5 mg oral tablet  2012  TAKE ONE TABLET BY 
MOUTH EVERY DAY  generic on list

 

 Medrol (Lei) 4 mg oral tablets,dose pack  2013  take as 
directed   

 

 nystatin-triamcinolone 100,000-0.1 unit/g-% topical cream  2013  apply to affected area(s) by topical route 2 times a day   

 

 fexofenadine 180 mg oral tablet  2013  take 1 tablet (180 mg) 
by oral route once daily   







Problem List







 Description  Status  Onset

 

 Arthritis unspecified  Active   

 

 Hyperlipidemia  Active   

 

 Hypertension  Active   







Vital Signs







 Date  Time  BP-Sys(mm[Hg]  BP-Edelmira(mm[Hg])  HR(bpm)  RR(rpm)  Temp  WT  HT  HC  
BMI  BSA  BMI Percentile  O2 Sat(%)

 

 10/26/2015  8:35:00 AM  124 mmHg  70 mmHg  80 bpm  18 rpm  97.1 F     64 in   
            

 

 2015  9:50:00 AM  132 mmHg  88 mmHg  90 bpm  22 rpm  98.5 F  387 lbs  64 
in     66.4277 kg/m  2.8154 m     97 %

 

 2014  10:36:00 AM  142 mmHg  80 mmHg  78 bpm  22 rpm  97.9 F  381 lbs  64 
in     65.40 kg/m2  2.79 m2     97 %

 

 2013  8:04:00 AM  138 mmHg  70 mmHg  88 bpm  22 rpm  98.1 F  388 lbs  64 
in     66.5994 kg/m  2.8191 m     98 %

 

 2013  9:54:00 AM  136 mmHg  70 mmHg  88 bpm  24 rpm  97.1 F  386 lbs  64 
in     66.26 kg/m2  2.81 m2     97 %

 

 2013  9:47:00 AM  134 mmHg  78 mmHg  70 bpm  16 rpm  97.8 F  381 lbs  64 
in     65.3978 kg/m  2.7935 m      

 

 2013  10:27:00 AM  136 mmHg  84 mmHg  82 bpm  22 rpm  98.2 F  384.2 lbs  
64 in     65.95 kg/m2  2.81 m2     96 %

 

 2012  7:59:00 AM  138 mmHg  72 mmHg  70 bpm  18 rpm  98 F  377 lbs  64 
in     64.7112 kg/m  2.7788 m      

 

 10/8/2012  8:29:00 AM  138 mmHg  70 mmHg  72 bpm  18 rpm  98.1 F  371 lbs  64 
in     63.68 kg/m2  2.76 m2      

 

 2012  10:57:00 AM  136 mmHg  72 mmHg  68 bpm  18 rpm  98 F  371 lbs  64 
in     63.6814 kg/m  2.7566 m      

 

 2011  8:43:00 AM  144 mmHg  78 mmHg  72 bpm  18 rpm  98.2 F  396 lbs  64 
in     67.97 kg/m2  2.85 m2      

 

 2010  1:51:00 PM  142 mmHg  80 mmHg  76 bpm  16 rpm  98.7 F  382 lbs  64 
in     65.5695 kg/m  2.7972 m      

 

 2009  8:23:00 AM  132 mmHg  78 mmHg  70 bpm  24 rpm  98.3 F  377 lbs  64 
in     64.71 kg/m2  2.78 m2      







Social History







 Name  Description  Comments

 

 Tobacco  Former smoker  for 2 years

 

       







History of Procedures







 Date Ordered  Description  Order Status

 

 10/26/2015 12:00 AM  X-RAY EXAM OF HIP  Reviewed

 

 2011 12:00 AM  COMPREHEN METABOLIC PANEL  Reviewed

 

 2011 12:00 AM  LIPID PANEL  Reviewed

 

 2011 12:00 AM  GLYCOSYLATED HEMOGLOBIN TEST  Reviewed

 

 2011 12:00 AM  CYTOPATH C/V THIN LAYER  Reviewed

 

 2011 12:00 AM  MAMMOGRAM SCREENING  Reviewed

 

 2012 12:00 AM  ASSAY GLUCOSE BLOOD QUANT  Reviewed

 

 2012 12:00 AM  TDAP VACCINE 7 YRS/> IM  Reviewed

 

 2009 12:00 AM  COMPREHEN METABOLIC PANEL  Reviewed

 

 2009 12:00 AM  LIPID PANEL  Reviewed

 

 2009 12:00 AM  GLYCOSYLATED HEMOGLOBIN TEST  Reviewed

 

 2009 12:00 AM  MAMMOGRAM SCREENING  Reviewed

 

 12/10/2012 12:00 AM  COMPREHEN METABOLIC PANEL  Reviewed

 

 12/10/2012 12:00 AM  LIPID PANEL  Reviewed

 

 12/10/2012 12:00 AM  GLYCOSYLATED HEMOGLOBIN TEST  Reviewed

 

 2012 12:00 AM  MAMMOGRAM SCREENING  Reviewed

 

 2012 12:00 AM  ASSAY GLUCOSE BLOOD QUANT  Reviewed

 

 2013 12:00 AM  COMPREHEN METABOLIC PANEL  Reviewed

 

 2013 12:00 AM  LIPID PANEL  Reviewed

 

 2013 12:00 AM  GLYCOSYLATED HEMOGLOBIN TEST  Reviewed

 

 2013 12:00 AM  CYTOPATH C/V THIN LAYER  Reviewed

 

 2013 12:00 AM  MAMMOGRAM SCREENING  Reviewed

 

 2009 12:00 AM  CYTOPATH C/V THIN LAYER  Reviewed

 

 2010 12:00 AM  COMPREHEN METABOLIC PANEL  Reviewed

 

 2010 12:00 AM  LIPID PANEL  Reviewed

 

 2010 12:00 AM  GLYCOSYLATED HEMOGLOBIN TEST  Reviewed

 

 2010 12:00 AM  CYTOPATH C/V THIN LAYER  Reviewed

 

 2010 12:00 AM  MAMMOGRAM SCREENING  Reviewed

 

 2014 12:00 AM  COMPREHEN METABOLIC PANEL  Reviewed

 

 2014 12:00 AM  LIPID PANEL  Reviewed

 

 2014 12:00 AM  GLYCOSYLATED HEMOGLOBIN TEST  Reviewed







Results Summary







 Data and Description  Results

 

 2009 10:01 AM  .0 mg/dLCHOLESTEROL 258 mg/dLTRIGLYCERIDES 252.0 mg
/dLHDL 51.0 mg/dLLDL 153.0 mg/dLTRIGLYCERIDES 252.0 mg/dLHDL 51.0 mg/
dLCHOLESTEROL 258 mg/dLGLUCOSE 116 SODIUM 144.0 mmol/LPOTASSIUM 4.80 mmol/
LCHLORIDE 105.0 mmol/LCO2 24.0 mmol/LBUN 18.0 mg/dLCREATININE 0.80 mg/dLSGOT/
AST 23.0 IU/LSGPT/ALT 26.0 IU/LALK PHOS 98.0 IU/LTOTAL PROTEIN 7.30 g/dLALBUMIN 
4.20 g/dLTOTAL BILI 0.40 mg/dLCALCIUM 10.30 mg/dLeGFR >60 mL/minGLUCOSE 116 
SODIUM 144.0 mmol/LPOTASSIUM 4.80 mmol/LCHLORIDE 105.0 mmol/LCO2 24.0 mmol/LBUN 
18.0 mg/dLCREATININE 0.80 mg/dLSGOT/AST 23.0 IU/LSGPT/ALT 26.0 IU/LALK PHOS 
98.0 IU/LTOTAL PROTEIN 7.30 g/dLALBUMIN 4.20 g/dLTOTAL BILI 0.40 mg/dLCALCIUM 
10.30 mg/dLeGFR >60 mL/min

 

 2011 9:00 AM  GLUCOSE 130.0 mg/dLSODIUM 139.0 mmol/LPOTASSIUM 4.40 mmol/
LCHLORIDE 104.0 mmol/LCO2 26.0 mmol/LBUN 21.0 mg/dLCREATININE 0.80 mg/dLSGOT/
AST 19.0 IU/LSGPT/ALT 25.0 IU/LALK PHOS 108.0 IU/LTOTAL PROTEIN 7.10 g/
dLALBUMIN 4.20 g/dLTOTAL BILI 0.40 mg/dLCALCIUM 10.20 mg/dLeGFR >60 mL/min/1.73 
[id number] 227.0 mg/dLCHOLESTEROL 282.0 mg/dLHDL 45.0 mg/dLLDL (CALC) 
192.0 mg/dL

 

 2012 9:42 AM  GLUCOSE 131.0 mg/dL

 

 2012 10:02 AM  GLUCOSE 150.0 mg/dLSODIUM 140.0 mmol/LPOTASSIUM 4.60 mmol/
LCHLORIDE 104.0 mmol/LCO2 25.0 mmol/LBUN 21.0 mg/dLCREATININE 0.90 mg/dLSGOT/
AST 19.0 IU/LSGPT/ALT 26.0 IU/LALK PHOS 99.0 IU/LTOTAL PROTEIN 7.10 g/dLALBUMIN 
4.20 g/dLTOTAL BILI 0.50 mg/dLCALCIUM 10.80 mg/dLeGFR 60 TRIGLYCERIDES 210.0 mg/
dLCHOLESTEROL 241.0 mg/dLHDL 48.0 mg/dLLDL (CALC) 151.0 mg/dL

 

 2012 8:54 AM  GLUCOSE 125.0 mg/dL

 

 12/3/2013 8:58 AM  TRIGLYCERIDES 210.0 mg/dLCHOLESTEROL 211.0 mg/dLHDL 49.0 mg/
dLLDL (CALC) 120.0 mg/dLGLUCOSE 125.0 mg/dLSODIUM 140.0 mmol/LPOTASSIUM 4.20 
mmol/LCHLORIDE 105.0 mmol/LCO2 25.0 mmol/LBUN 24.0 mg/dLCREATININE 0.90 mg/
dLSGOT/AST 23.0 IU/LSGPT/ALT 28.0 IU/LALK PHOS 111.0 IU/LTOTAL PROTEIN 7.0 g/
dLALBUMIN 4.30 g/dLTOTAL BILI 0.60 mg/dLCALCIUM 10.60 mg/dLeGFR >60 mL/min/1.73 
m2Est Avg Glucose 151.3 mg/dL

 

 2014 9:05 AM  TRIGLYCERIDES 210.0 mg/dLCHOLESTEROL 204.0 mg/dLHDL 51.0 mg/
dLLDL (CALC) 111.0 mg/dLGLUCOSE 120.0 mg/dLSODIUM 141.0 mmol/LPOTASSIUM 4.40 
mmol/LCHLORIDE 103.0 mmol/LCO2 25.0 mmol/LBUN 18.0 mg/dLCREATININE 0.90 mg/
dLSGOT/AST 24.0 IU/LSGPT/ALT 29.0 IU/LALK PHOS 98.0 IU/LTOTAL PROTEIN 7.0 g/
dLALBUMIN 4.10 g/dLTOTAL BILI 0.60 mg/dLCALCIUM 10.20 mg/dLeGFR 60 Est Avg 
Glucose 148.5 mg/dL







History Of Immunizations







 Name  Date Admin  Mfg Name  Mfg Code  Trade Name  Lot#  Route  Inj  Vis Given  
Vis Pub  CVX

 

 Tdap  2012  [employer]era  SKB  ADACEL  [id number]  Intramuscular  Right 
Deltoid  2008  115







History of Past Illness







 Name  Date of Onset  Comments

 

 Hypertension  Dec  7 2009  1:45PM   

 

 Glucose Intolerance  Dec  7 2009  1:45PM   

 

 Hyperlipidemia  Dec  7 2009  1:45PM   

 

 Screening Mammogram  Dec  7 2009  1:45PM   

 

 Routine gynecological examination  Dec 21 2009  8:25AM   

 

 Hypertension Stable  Dec 21 2009  8:25AM   

 

 Hyperlipidemia, unspecified Stable  Dec 21 2009  8:25AM   

 

 Arthritis unspecified      

 

 Hyperlipidemia      

 

 Hypertension      

 

 Hypertension  Dec 17 2010  1:41PM   

 

 Glucose Intolerance  Dec 17 2010  1:41PM   

 

 Hyperlipidemia  Dec 17 2010  1:41PM   

 

 Hyperglycemia  Dec 17 2010  1:41PM   

 

 Routine gynecological examination  Dec 20 2010  1:52PM   

 

 Overactive bladder  Dec 20 2010  1:52PM   

 

 Hypertension  2011 12:05PM   

 

 Glucose Intolerance  2011 12:05PM   

 

 Hyperlipidemia  2011 12:05PM   

 

 Hyperglycemia  2011 12:05PM   

 

 Routine gynecological examination  Dec  8 2011  8:45AM   

 

 Overactive bladder  Dec  8 2011  8:45AM   

 

 Screening Examination for Breast Cancer  Dec  8 2011  8:45AM   

 

 Hyperglycemia  Dec  8 2011  8:45AM   

 

 Laceration 2nd digit right hand  May 21 2012 10:59AM   

 

 Acute Pharyngitis  Oct  8 2012  8:30AM   

 

 Hypertension  Dec 10 2012 11:59AM   

 

 Glucose Intolerance  Dec 10 2012 11:59AM   

 

 Hyperlipidemia  Dec 10 2012 11:59AM   

 

 Hyperglycemia  Dec 10 2012 11:59AM   

 

 Screening Examination for Breast Cancer  Dec 19 2012  8:02AM   

 

 Hypertension  Dec 19 2012  8:02AM   

 

 Hyperlipidemia, Mixed  Dec 19 2012  8:02AM   

 

 Hyperglycemia  Dec 19 2012  8:02AM   

 

 Upper Respiratory Infections  May 23 2013 10:32AM   

 

 Eczema  Aug 14 2013  9:49AM   

 

 Eustachian Tube Dysfunction, Bilateral  Aug 14 2013  9:49AM   

 

 Worried well  Sep 16 2013  9:56AM   

 

 Hypertension  Dec  2 2013 11:57AM   

 

 Glucose Intolerance  Dec  2 2013 11:57AM   

 

 Hyperlipidemia  Dec  2 2013 11:57AM   

 

 Hyperglycemia  Dec  2 2013 11:57AM   

 

 Routine gynecological examination  Dec 17 2013  8:06AM   

 

 Screening Examination for Breast Cancer  Dec 17 2013  8:06AM   

 

 Hypertension  2014 11:58AM   

 

 Glucose Intolerance  2014 11:58AM   

 

 Hyperlipidemia  2014 11:58AM   

 

 Hyperglycemia  2014 11:58AM   

 

 Essential Hypertension  2014 10:39AM   

 

 Diabetes Mellitus, Type II  2014 10:39AM   

 

 Hyperlipidemia, Mixed  2014 10:39AM   

 

 Sinusitis, Acute  Sep 14 2015  9:52AM   

 

 Bronchitis, Acute  Sep 14 2015  9:52AM   

 

 Arthritis unspecified  Oct 26 2015  8:37AM   







Payers







 Insurance Name  Company Name  Plan Name  Plan Number  Policy Number  Policy 
Group Number  Start Date

 

    Medicare Part A  Medicare Part A     [id number]     2008

 

    Medicare Part B  [employer] [place]     [id number]     2008







History of Encounters







 Visit Date  Visit Type  Provider

 

 10/26/2015  Office visit  Brain Hauser DO

 

 2015  Office visit  Brain Hauser DO

 

 2014  Office visit  Brain Hauser DO

 

 2013  Office visit  Brain Hauser DO

 

 2013  Office visit  Brain Hauser DO

 

 2013  Office visit  Brain Hauser DO

 

 2013  Office visit  Margie RODAS

 

 2012  Office visit  Brain Hauser DO

 

 10/8/2012  Office visit  Brain Hauser DO

 

 2012  Office visit  Brain Hauser DO

 

 2011  Office visit  Brain Hauser DO

 

 2010  Office visit  Brain Hauser DO

 

 2009  Office visit  Brain Hauser DO

 

 2009  Office visit  Brain Hauser DO

## 2019-02-11 NOTE — XMS REPORT
MU2 Ambulatory Summary

 Created on: 2017



Elena Deluca

External Reference #: [id number]

: 1955

Sex: Female



Demographics







 Address  [address]

[place]  [address]

 

 Home Phone  [phone]

 

 Preferred Language  English

 

 Marital Status  

 

 Hoahaoism Affiliation  Unknown

 

 Race  White

 

 Ethnic Group  Not  or 





Author







 Author  Brain Hauser

 

 [facility]

 

 Address  [address]

[place], [place]  [id number]



 

 Phone  [phone]







Care Team Providers







 Care Team Member Name  Role  Phone

 

 Brain Hauser  PCP  Unavailable

 

 Brain Hauser  PreferredProvider  Unavailable







Allergies and Adverse Reactions







 Name  Reaction  Notes

 

 NO KNOWN DRUG ALLERGIES      







Plan of Treatment

Not available.



Medications







 Active 

 

 Name  Start Date  Estimated Completion Date  SIG  Comments

 

 Calcium 600mg        one tablet daily   

 

 Multivitamin Oral Tablet        take 1 tablet by oral route daily   

 

 aspirin 81 mg oral tablet        take 1 tablet (81 mg) by oral route once 
daily   

 

 Fish Oil 1,000 mg oral capsule  2010     take 2 capsules by oral route 
daily   

 

 valsartan-hydrochlorothiazide 160-12.5 mg oral tablet  2014     TAKE ONE 
TABLET BY MOUTH EVERY DAY   

 

 simvastatin 20 mg oral tablet  2014     TAKE ONE TABLET BY MOUTH EVERY 
DAY IN THE EVENING   

 

 Osteo Bi-Flex 250-200 mg oral tablet        take 1 tablet by oral route daily 
  

 

 simvastatin 20 mg oral tablet  2014     TAKE ONE TABLET BY MOUTH EVERY 
DAY IN THE EVENING   

 

 valsartan-hydrochlorothiazide 160-12.5 mg oral tablet  2015     TAKE ONE 
TABLET BY MOUTH EVERY DAY   

 

 simvastatin 20 mg oral tablet  2015     TAKE ONE TABLET BY MOUTH ONCE 
DAILY AT BEDTIME   

 

 valsartan-hydrochlorothiazide 160-12.5 mg oral tablet  2015     TAKE ONE 
TABLET BY MOUTH ONCE DAILY   

 

 valsartan-hydrochlorothiazide 160-12.5 mg oral tablet  8/3/2015     TAKE ONE 
TABLET BY MOUTH ONCE DAILY   

 

 simvastatin 20 mg oral tablet  2015     TAKE ONE TABLET BY MOUTH ONCE 
DAILY AT BEDTIME   

 

 amlodipine 10 mg oral tablet  10/18/2015     TAKE ONE TABLET BY MOUTH ONCE 
DAILY   

 

 valsartan-hydrochlorothiazide 160-12.5 mg oral tablet  2016     TAKE ONE 
TABLET BY MOUTH ONCE DAILY   

 

 simvastatin 20 mg oral tablet  2016     TAKE ONE TABLET BY MOUTH ONCE 
DAILY AT BEDTIME   

 

 valsartan-hydrochlorothiazide 160-12.5 mg oral tablet  2016     TAKE ONE 
TABLET BY MOUTH ONCE DAILY   

 

 simvastatin 20 mg oral tablet  2016     TAKE ONE TABLET BY MOUTH ONCE 
DAILY AT BEDTIME   

 

 amlodipine 10 mg oral tablet  10/9/2016     TAKE ONE TABLET BY MOUTH ONCE 
DAILY   

 

 simvastatin 20 mg oral tablet  2016     TAKE ONE TABLET BY MOUTH ONCE 
DAILY AT BEDTIME   

 

 amlodipine 10 mg oral tablet  2017     TAKE ONE TABLET BY MOUTH ONCE DAILY
   

 

 cyclobenzaprine 10 mg oral tablet  2017     take 1 tablet (10 mg) by oral 
route at bedtime   









  

 

 Name  Start Date  Expiration Date  SIG  Comments

 

 Augmentin 500-125 mg oral tablet  10/8/2012  10/15/2012  take 1 tablet by oral 
route every 12 hours for 7 days   

 

 Diflucan 100 mg oral tablet  10/8/2012  10/11/2012  take 1 tablet (100 mg) by 
oral route once daily   

 

 amoxicillin 500 mg oral capsule  2013  take 1 capsule (500 mg) 
by oral route 3 times per day for 10 days   

 

 simvastatin 20 mg oral tablet  9/10/2013  10/10/2013  TAKE ONE TABLET BY MOUTH 
EVERY DAY IN THE EVENING   

 

 amlodipine 10 mg oral tablet  2014  TAKE ONE TABLET BY MOUTH 
EVERY DAY   

 

 Augmentin 500-125 mg oral tablet  2015  take 1 tablet by oral 
route every 12 hours for 7 days   









 Discontinued 

 

 Name  Start Date  Discontinued Date  SIG  Comments

 

 niacin 500 mg oral tablet extended release 24 hr     2011  take 1 tablets 
(500 mg) by oral route once daily  Starting on Simvastatin

 

 Vesicare 5 mg oral tablet  2011  take 1 tablet by oral route 
daily for 30 days   

 

 Osteo Bi-Flex (5-Loxin) 1,500-400-100 mg-unit-mg oral tablet     2013  
take 1 tablet by oral route daily   

 

 Diovan -12.5 mg oral tablet  2012  TAKE ONE TABLET BY 
MOUTH EVERY DAY  generic on list

 

 Medrol (Lei) 4 mg oral tablets,dose pack  2013  take as 
directed   

 

 nystatin-triamcinolone 100,000-0.1 unit/g-% topical cream  2013  apply to affected area(s) by topical route 2 times a day   

 

 fexofenadine 180 mg oral tablet  2013  take 1 tablet (180 mg) 
by oral route once daily   

 

 prednisone 20 mg oral tablet  10/26/2015  2016  take 3 tablets (60 mg) by 
oral route once daily for 3 days then 2 tablets (40 mg) daily for 2 days   

 

 cyclobenzaprine 10 mg oral tablet  10/30/2015  2016  take 1 tablet (10 mg
) by oral route 3 times per day   







Problem List







 Description  Status  Onset

 

 Arthritis unspecified  Active   

 

 Hyperlipidemia  Active   

 

 Hypertension  Active   







Vital Signs







 Date  Time  BP-Sys(mm[Hg]  BP-Edelmira(mm[Hg])  HR(bpm)  RR(rpm)  Temp  WT  HT  HC  
BMI  BSA  BMI Percentile  O2 Sat(%)

 

 2017  9:36:00 AM  132 mmHg  72 mmHg  76 bpm  20 rpm  97.2 F  396.25 lbs  
64 in     68.02 kg/m2  2.85 m2     97 %

 

 2016  7:58:00 AM  136 mmHg  70 mmHg  85 bpm  20 rpm  98.1 F  391 lbs  64 
in     67.1143 kg/m  2.8299 m     95 %

 

 10/26/2015  8:35:00 AM  124 mmHg  70 mmHg  80 bpm  18 rpm  97.1 F     64 in   
            

 

 2015  9:50:00 AM  132 mmHg  88 mmHg  90 bpm  22 rpm  98.5 F  387 lbs  64 
in     66.4277 kg/m  2.8154 m     97 %

 

 2014  10:36:00 AM  142 mmHg  80 mmHg  78 bpm  22 rpm  97.9 F  381 lbs  64 
in     65.40 kg/m2  2.79 m2     97 %

 

 2013  8:04:00 AM  138 mmHg  70 mmHg  88 bpm  22 rpm  98.1 F  388 lbs  64 
in     66.5994 kg/m  2.8191 m     98 %

 

 2013  9:54:00 AM  136 mmHg  70 mmHg  88 bpm  24 rpm  97.1 F  386 lbs  64 
in     66.26 kg/m2  2.81 m2     97 %

 

 2013  9:47:00 AM  134 mmHg  78 mmHg  70 bpm  16 rpm  97.8 F  381 lbs  64 
in     65.3978 kg/m  2.7935 m      

 

 2013  10:27:00 AM  136 mmHg  84 mmHg  82 bpm  22 rpm  98.2 F  384.2 lbs  
64 in     65.95 kg/m2  2.81 m2     96 %

 

 2012  7:59:00 AM  138 mmHg  72 mmHg  70 bpm  18 rpm  98 F  377 lbs  64 
in     64.7112 kg/m  2.7788 m      

 

 10/8/2012  8:29:00 AM  138 mmHg  70 mmHg  72 bpm  18 rpm  98.1 F  371 lbs  64 
in     63.68 kg/m2  2.76 m2      

 

 2012  10:57:00 AM  136 mmHg  72 mmHg  68 bpm  18 rpm  98 F  371 lbs  64 
in     63.6814 kg/m  2.7566 m      

 

 2011  8:43:00 AM  144 mmHg  78 mmHg  72 bpm  18 rpm  98.2 F  396 lbs  64 
in     67.97 kg/m2  2.85 m2      

 

 2010  1:51:00 PM  142 mmHg  80 mmHg  76 bpm  16 rpm  98.7 F  382 lbs  64 
in     65.5695 kg/m  2.7972 m      

 

 2009  8:23:00 AM  132 mmHg  78 mmHg  70 bpm  24 rpm  98.3 F  377 lbs  64 
in     64.71 kg/m2  2.78 m2      







Social History







 Name  Description  Comments

 

 Tobacco  Former smoker  for 2 years

 

       







History of Procedures







 Date Ordered  Description  Order Status

 

 10/26/2015 12:00 AM  X-RAY EXAM OF HIP  Reviewed

 

 10/26/2015 12:00 AM  X-RAY EXAM OF KNEE 3  Reviewed

 

 2011 12:00 AM  COMPREHEN METABOLIC PANEL  Reviewed

 

 2011 12:00 AM  LIPID PANEL  Reviewed

 

 2011 12:00 AM  GLYCOSYLATED HEMOGLOBIN TEST  Reviewed

 

 2011 12:00 AM  CYTOPATH C/V THIN LAYER  Reviewed

 

 2011 12:00 AM  MAMMOGRAM SCREENING  Reviewed

 

 2012 12:00 AM  ASSAY GLUCOSE BLOOD QUANT  Reviewed

 

 10/20/2016 12:00 AM  COMPREHEN METABOLIC PANEL  Reviewed

 

 10/20/2016 12:00 AM  LIPID PANEL  Reviewed

 

 10/20/2016 12:00 AM  GLYCOSYLATED HEMOGLOBIN TEST  Reviewed

 

 10/20/2016 12:00 AM  MICROALBUMIN QUANTITATIVE  Reviewed

 

 10/20/2016 12:00 AM  VITAMIN B-12  Reviewed

 

 2017 12:00 AM  RADEX SHOULDER COMPLETE MINIMUM 2 VIEWS  Reviewed

 

 2017 12:00 AM  RADEX HUMERUS MINIMUM 2 VIEWS  Reviewed

 

 2017 12:00 AM  Decadron 8mg Injection  Reviewed

 

 2017 12:00 AM  Depo-Medrol 80mg Injection  Reviewed

 

 2012 12:00 AM  TDAP VACCINE 7 YRS/> IM  Reviewed

 

 2009 12:00 AM  COMPREHEN METABOLIC PANEL  Reviewed

 

 2009 12:00 AM  LIPID PANEL  Reviewed

 

 2009 12:00 AM  GLYCOSYLATED HEMOGLOBIN TEST  Reviewed

 

 2009 12:00 AM  MAMMOGRAM SCREENING  Reviewed

 

 12/10/2012 12:00 AM  COMPREHEN METABOLIC PANEL  Reviewed

 

 12/10/2012 12:00 AM  LIPID PANEL  Reviewed

 

 12/10/2012 12:00 AM  GLYCOSYLATED HEMOGLOBIN TEST  Reviewed

 

 2012 12:00 AM  MAMMOGRAM SCREENING  Reviewed

 

 2012 12:00 AM  ASSAY GLUCOSE BLOOD QUANT  Reviewed

 

 2013 12:00 AM  COMPREHEN METABOLIC PANEL  Reviewed

 

 2013 12:00 AM  LIPID PANEL  Reviewed

 

 2013 12:00 AM  GLYCOSYLATED HEMOGLOBIN TEST  Reviewed

 

 2013 12:00 AM  CYTOPATH C/V THIN LAYER  Reviewed

 

 2013 12:00 AM  MAMMOGRAM SCREENING  Reviewed

 

 2009 12:00 AM  CYTOPATH C/V THIN LAYER  Reviewed

 

 2010 12:00 AM  COMPREHEN METABOLIC PANEL  Reviewed

 

 2010 12:00 AM  LIPID PANEL  Reviewed

 

 2010 12:00 AM  GLYCOSYLATED HEMOGLOBIN TEST  Reviewed

 

 2010 12:00 AM  CYTOPATH C/V THIN LAYER  Reviewed

 

 2010 12:00 AM  MAMMOGRAM SCREENING  Reviewed

 

 2014 12:00 AM  COMPREHEN METABOLIC PANEL  Reviewed

 

 2014 12:00 AM  LIPID PANEL  Reviewed

 

 2014 12:00 AM  GLYCOSYLATED HEMOGLOBIN TEST  Reviewed







Results Summary







 Data and Description  Results

 

 2009 10:01 AM  .0 mg/dLCHOLESTEROL 258 mg/dLTRIGLYCERIDES 252.0 mg
/dLHDL 51.0 mg/dLTOT CHOL/HDL 5.1 .0 mg/dLTRIGLYCERIDES 252.0 mg/dLTOT 
CHOL/HDL 5.1 HDL 51.0 mg/dLCHOLESTEROL 258 mg/dLGLYCOHEMOGLOBIN A1C 6.3 
GLYCOHEMOGLOBIN A1C 6.3 GLUCOSE 116 SODIUM 144.0 mmol/LPOTASSIUM 4.80 mmol/
LCHLORIDE 105.0 mmol/LCO2 24.0 mmol/LBUN 18.0 mg/dLCREATININE 0.80 mg/dLSGOT/
AST 23.0 IU/LSGPT/ALT 26.0 IU/LALK PHOS 98.0 IU/LTOTAL PROTEIN 7.30 g/dLALBUMIN 
4.20 g/dLTOTAL BILI 0.40 mg/dLCALCIUM 10.30 mg/dLAGE 54 GFR NonAA 75 GFR AA 91 
eGFR >60 mL/mineGFR AA* >60 GLUCOSE 116 SODIUM 144.0 mmol/LPOTASSIUM 4.80 mmol/
LCHLORIDE 105.0 mmol/LCO2 24.0 mmol/LBUN 18.0 mg/dLCREATININE 0.80 mg/dLSGOT/
AST 23.0 IU/LSGPT/ALT 26.0 IU/LALK PHOS 98.0 IU/LTOTAL PROTEIN 7.30 g/dLALBUMIN 
4.20 g/dLTOTAL BILI 0.40 mg/dLCALCIUM 10.30 mg/dLAGE 54 GFR NonAA 75 GFR AA 91 
eGFR >60 mL/mineGFR AA* >60 

 

 2011 9:00 AM  GLUCOSE 130.0 mg/dLSODIUM 139.0 mmol/LPOTASSIUM 4.40 mmol/
LCHLORIDE 104.0 mmol/LCO2 26.0 mmol/LBUN 21.0 mg/dLCREATININE 0.80 mg/dLSGOT/
AST 19.0 IU/LSGPT/ALT 25.0 IU/LALK PHOS 108.0 IU/LTOTAL PROTEIN 7.10 g/
dLALBUMIN 4.20 g/dLTOTAL BILI 0.40 mg/dLCALCIUM 10.20 mg/dLAGE 56 GFR NonAA 74 
GFR AA 90 eGFR >60 mL/min/1.73 m2eGFR AA* >60 TRIGLYCERIDES 227.0 mg/
dLCHOLESTEROL 282.0 mg/dLHDL 45.0 mg/dLTOT CHOL/HDL 6.3 LDL (CALC) 192.0 mg/
dLGLYCOHEMOGLOBIN A1C 6.10 %

 

 2012 9:42 AM  GLUCOSE 131.0 mg/dL

 

 2012 10:02 AM  GLUCOSE 150.0 mg/dLSODIUM 140.0 mmol/LPOTASSIUM 4.60 mmol/
LCHLORIDE 104.0 mmol/LCO2 25.0 mmol/LBUN 21.0 mg/dLCREATININE 0.90 mg/dLSGOT/
AST 19.0 IU/LSGPT/ALT 26.0 IU/LALK PHOS 99.0 IU/LTOTAL PROTEIN 7.10 g/dLALBUMIN 
4.20 g/dLTOTAL BILI 0.50 mg/dLCALCIUM 10.80 mg/dLAGE 57 GFR NonAA 65 GFR AA 79 
eGFR 60 eGFR AA* 60 TRIGLYCERIDES 210.0 mg/dLCHOLESTEROL 241.0 mg/dLHDL 48.0 mg/
dLTOT CHOL/HDL 5.0 LDL (CALC) 151.0 mg/dLGLYCOHEMOGLOBIN A1C 6.30 %

 

 2012 8:54 AM  GLUCOSE 125.0 mg/dL

 

 12/3/2013 8:58 AM  TRIGLYCERIDES 210.0 mg/dLCHOLESTEROL 211.0 mg/dLHDL 49.0 mg/
dLTOT CHOL/HDL 4.3 LDL (CALC) 120.0 mg/dLGLUCOSE 125.0 mg/dLSODIUM 140.0 mmol/
LPOTASSIUM 4.20 mmol/LCHLORIDE 105.0 mmol/LCO2 25.0 mmol/LBUN 24.0 mg/
dLCREATININE 0.90 mg/dLSGOT/AST 23.0 IU/LSGPT/ALT 28.0 IU/LALK PHOS 111.0 IU/
LTOTAL PROTEIN 7.0 g/dLALBUMIN 4.30 g/dLTOTAL BILI 0.60 mg/dLCALCIUM 10.60 mg/
dLAGE 58 GFR NonAA 64 GFR AA 78 eGFR >60 mL/min/1.73 m2eGFR AA* >60 HGB A1C 
6.90 %Est Avg Glucose 151.3 mg/dL

 

 2014 9:05 AM  TRIGLYCERIDES 210.0 mg/dLCHOLESTEROL 204.0 mg/dLHDL 51.0 mg/
dLTOT CHOL/HDL 4.0 LDL (CALC) 111.0 mg/dLGLUCOSE 120.0 mg/dLSODIUM 141.0 mmol/
LPOTASSIUM 4.40 mmol/LCHLORIDE 103.0 mmol/LCO2 25.0 mmol/LBUN 18.0 mg/
dLCREATININE 0.90 mg/dLSGOT/AST 24.0 IU/LSGPT/ALT 29.0 IU/LALK PHOS 98.0 IU/
LTOTAL PROTEIN 7.0 g/dLALBUMIN 4.10 g/dLTOTAL BILI 0.60 mg/dLCALCIUM 10.20 mg/
dLAGE 59 GFR NonAA 64 GFR AA 78 eGFR 60 eGFR AA* 60 HGB A1C 6.80 %Est Avg 
Glucose 148.5 mg/dL

 

 10/24/2016 10:22 AM  HGB A1C 6.50 %Est Avg Glucose 139.9 mg/dLGLUCOSE 129.0 mg/
dLSODIUM 143.0 mmol/LPOTASSIUM 4.40 mmol/LCHLORIDE 104.0 mmol/LCO2 24.0 mmol/
LBUN 25.0 mg/dLCREATININE 0.90 mg/dLSGOT/AST 25.0 IU/LSGPT/ALT 35.0 IU/LALK 
PHOS 96.0 IU/LTOTAL PROTEIN 7.10 g/dLALBUMIN 4.20 g/dLTOTAL BILI 0.50 mg/
dLCALCIUM 10.30 mg/dLAGE 61 GFR NonAA 64 GFR AA 78 eGFR >60 mL/min/1.73meGFR 
AA* >60 TRIGLYCERIDES 181.0 mg/dLCHOLESTEROL 201.0 mg/dLHDL 47.0 mg/dLTOT CHOL/
HDL 4.3 LDL (CALC) 118.0 mg/dLMICROALBUMIN UR <0.5 ug/mLVITAMIN B12 485.0 pg/mL







History Of Immunizations







 Name  Date Admin  Mfg Name  Mfg Code  Trade Name  Lot#  Route  Inj  Vis Given  
Vis Pub  CVX

 

 Tdap  2012  [employer]  SKB  ADACEL  [id number]  Intramuscular  Right 
Deltoid  2008  115

 

 Influenza  2015  Not Entered  NE  Afluria     Not Entered  Not Entered  1  141

 

 Influenza  2016  Not Entered  NE  Afluria     Not Entered  Not Entered  1  141







History of Past Illness







 Name  Date of Onset  Comments

 

 Hypertension  Dec  7 2009  1:45PM   

 

 Glucose Intolerance  Dec  7 2009  1:45PM   

 

 Hyperlipidemia  Dec  7 2009  1:45PM   

 

 Screening Mammogram  Dec  7 2009  1:45PM   

 

 Routine gynecological examination  Dec 21 2009  8:25AM   

 

 Hypertension Stable  Dec 21 2009  8:25AM   

 

 Hyperlipidemia, unspecified Stable  Dec 21 2009  8:25AM   

 

 Arthritis unspecified      

 

 Hyperlipidemia      

 

 Hypertension      

 

 Hypertension  Dec 17 2010  1:41PM   

 

 Glucose Intolerance  Dec 17 2010  1:41PM   

 

 Hyperlipidemia  Dec 17 2010  1:41PM   

 

 Hyperglycemia  Dec 17 2010  1:41PM   

 

 Routine gynecological examination  Dec 20 2010  1:52PM   

 

 Overactive bladder  Dec 20 2010  1:52PM   

 

 Hypertension  2011 12:05PM   

 

 Glucose Intolerance  2011 12:05PM   

 

 Hyperlipidemia  2011 12:05PM   

 

 Hyperglycemia  2011 12:05PM   

 

 Routine gynecological examination  Dec  8 2011  8:45AM   

 

 Overactive bladder  Dec  8 2011  8:45AM   

 

 Screening Examination for Breast Cancer  Dec  8 2011  8:45AM   

 

 Hyperglycemia  Dec  8 2011  8:45AM   

 

 Laceration 2nd digit right hand  May 21 2012 10:59AM   

 

 Acute Pharyngitis  Oct  8 2012  8:30AM   

 

 Hypertension  Dec 10 2012 11:59AM   

 

 Glucose Intolerance  Dec 10 2012 11:59AM   

 

 Hyperlipidemia  Dec 10 2012 11:59AM   

 

 Hyperglycemia  Dec 10 2012 11:59AM   

 

 Screening Examination for Breast Cancer  Dec 19 2012  8:02AM   

 

 Hypertension  Dec 19 2012  8:02AM   

 

 Hyperlipidemia, Mixed  Dec 19 2012  8:02AM   

 

 Hyperglycemia  Dec 19 2012  8:02AM   

 

 Upper Respiratory Infections  May 23 2013 10:32AM   

 

 Eczema  Aug 14 2013  9:49AM   

 

 Eustachian Tube Dysfunction, Bilateral  Aug 14 2013  9:49AM   

 

 Worried well  Sep 16 2013  9:56AM   

 

 Hypertension  Dec  2 2013 11:57AM   

 

 Glucose Intolerance  Dec  2 2013 11:57AM   

 

 Hyperlipidemia  Dec  2 2013 11:57AM   

 

 Hyperglycemia  Dec  2 2013 11:57AM   

 

 Routine gynecological examination  Dec 17 2013  8:06AM   

 

 Screening Examination for Breast Cancer  Dec 17 2013  8:06AM   

 

 Hypertension  2014 11:58AM   

 

 Glucose Intolerance  2014 11:58AM   

 

 Hyperlipidemia  2014 11:58AM   

 

 Hyperglycemia  2014 11:58AM   

 

 Essential Hypertension  2014 10:39AM   

 

 Diabetes Mellitus, Type II  2014 10:39AM   

 

 Hyperlipidemia, Mixed  2014 10:39AM   

 

 Sinusitis, Acute  Sep 14 2015  9:52AM   

 

 Bronchitis, Acute  Sep 14 2015  9:52AM   

 

 Arthritis unspecified  Oct 26 2015  8:37AM   

 

 Hypertension  Oct 20 2016  9:16AM   

 

 Diabetes Mellitus, Type II  Oct 20 2016  9:16AM   

 

 Hyperlipidemia  Oct 20 2016  9:16AM   

 

 Routine gynecological examination  2016  8:11AM   

 

 Screening Examination for Breast Cancer  2016  8:11AM   

 

 Visit for screening mammogram  2016  8:11AM   

 

 Hyperlipidemia  2016  8:11AM   

 

 Hypertension  2016  8:11AM   

 

 Controlled diabetes mellitus type II without complication  2016  8:11AM
   

 

 Pain in joint of right shoulder  2017  9:41AM   







Payers







 Insurance Name  Company Name  Plan Name  Plan Number  Policy Number  Policy 
Group Number  Start Date

 

    Medicare RHC  Medicare RHC     [id number]     N/A

 

    Medicare Part B  [employer] [place]     [id number]     2008

 

    Medicare Part A  Medicare Part A     [id number]     2008

 

    Medicare Part A  Medicare - Lab/Xray     [id number]     2008







History of Encounters







 Visit Date  Visit Type  Provider

 

 2017  Office visit  Brain Hauser DO

 

 2016  Office visit  Brain Hauser DO

 

 10/26/2015  Office visit  Brain Shafferte DO

 

 2015  Office visit  Brain Hauser DO

 

 2014  Office visit  Brain Hauser DO

 

 2013  Office visit  Brain Shafferte DO

 

 2013  Office visit  Brain Hauser DO

 

 2013  Office visit  Brain Hauser DO

 

 2013  Office visit  Margie RODAS

 

 2012  Office visit  Brain Hauser DO

 

 10/8/2012  Office visit  Brain Shafferte DO

 

 2012  Office visit  Brain Hauser DO

 

 2011  Office visit  Brain Hauser DO

 

 2010  Office visit  Brain Hauser DO

 

 2009  Office visit  Brain Shafferte DO

 

 2009  Office visit  Brain Hauser DO

## 2019-02-11 NOTE — DIAGNOSTIC IMAGING REPORT
INDICATION: Right flank pain



EXAM: Supine upright abdominal images



FINDINGS:  

The gallbladder is surgically absent. Bowel gas pattern is

normal. There are no pathologic masses or calcifications.



IMPRESSION:

No acute abnormalities are seen in the abdomen.



Dictated by: 



  Dictated on workstation # [id number]

## 2019-02-11 NOTE — XMS REPORT
MU2 Ambulatory Summary

 Created on: 10/27/2015



Elena Deluca

External Reference #: [id number]

: 1955

Sex: Female



Demographics







 Address  [address]

[place]  [address]

 

 Home Phone  [phone]

 

 Preferred Language  English

 

 Marital Status  

 

 Congregational Affiliation  Unknown

 

 Race  White

 

 Ethnic Group  Not  or 





Author







 Author  Brain Hauser

 

 [facility]

 

 Address  [address]

[place], [place]  [id number]



 

 Phone  [phone]







Care Team Providers







 Care Team Member Name  Role  Phone

 

 Brain Hauser  PCP  Unavailable







Allergies and Adverse Reactions







 Name  Reaction  Notes

 

 NO KNOWN DRUG ALLERGIES      







Plan of Treatment







 Planned Activity  Comments  Planned Date  Planned Time  Plan/Goal

 

 X-RAY EXAM OF KNEE 3     10/26/2015  12:00 AM   







Medications







 Active 

 

 Name  Start Date  Estimated Completion Date  SIG  Comments

 

 Calcium 600mg        one tablet daily   

 

 Multivitamin Oral Tablet        take 1 tablet by oral route daily   

 

 aspirin 81 mg oral tablet        take 1 tablet (81 mg) by oral route once 
daily   

 

 Fish Oil 1,000 mg oral capsule  2010     take 2 capsules by oral route 
daily   

 

 valsartan-hydrochlorothiazide 160-12.5 mg oral tablet  2014     TAKE ONE 
TABLET BY MOUTH EVERY DAY   

 

 simvastatin 20 mg oral tablet  2014     TAKE ONE TABLET BY MOUTH EVERY 
DAY IN THE EVENING   

 

 amlodipine 10 mg oral tablet  2014  TAKE ONE TABLET BY MOUTH 
EVERY DAY   

 

 Osteo Bi-Flex 250-200 mg oral tablet        take 1 tablet by oral route daily 
  

 

 simvastatin 20 mg oral tablet  2014     TAKE ONE TABLET BY MOUTH EVERY 
DAY IN THE EVENING   

 

 valsartan-hydrochlorothiazide 160-12.5 mg oral tablet  2015     TAKE ONE 
TABLET BY MOUTH EVERY DAY   

 

 simvastatin 20 mg oral tablet  2015     TAKE ONE TABLET BY MOUTH ONCE 
DAILY AT BEDTIME   

 

 valsartan-hydrochlorothiazide 160-12.5 mg oral tablet  2015     TAKE ONE 
TABLET BY MOUTH ONCE DAILY   

 

 valsartan-hydrochlorothiazide 160-12.5 mg oral tablet  8/3/2015     TAKE ONE 
TABLET BY MOUTH ONCE DAILY   

 

 simvastatin 20 mg oral tablet  2015     TAKE ONE TABLET BY MOUTH ONCE 
DAILY AT BEDTIME   

 

 amlodipine 10 mg oral tablet  10/18/2015     TAKE ONE TABLET BY MOUTH ONCE 
DAILY   

 

 cyclobenzaprine 10 mg oral tablet  10/26/2015     take 1 tablet (10 mg) by 
oral route 3 times per day   

 

 prednisone 20 mg oral tablet  10/26/2015     take 3 tablets (60 mg) by oral 
route once daily for 3 days then 2 tablets (40 mg) daily for 2 days   









  

 

 Name  Start Date  Expiration Date  SIG  Comments

 

 Augmentin 500-125 mg oral tablet  10/8/2012  10/15/2012  take 1 tablet by oral 
route every 12 hours for 7 days   

 

 Diflucan 100 mg oral tablet  10/8/2012  10/11/2012  take 1 tablet (100 mg) by 
oral route once daily   

 

 amoxicillin 500 mg oral capsule  2013  take 1 capsule (500 mg) 
by oral route 3 times per day for 10 days   

 

 simvastatin 20 mg oral tablet  9/10/2013  10/10/2013  TAKE ONE TABLET BY MOUTH 
EVERY DAY IN THE EVENING   

 

 Augmentin 500-125 mg oral tablet  2015  take 1 tablet by oral 
route every 12 hours for 7 days   









 Discontinued 

 

 Name  Start Date  Discontinued Date  SIG  Comments

 

 niacin 500 mg oral tablet extended release 24 hr     2011  take 1 tablets 
(500 mg) by oral route once daily  Starting on Simvastatin

 

 Vesicare 5 mg oral tablet  2011  take 1 tablet by oral route 
daily for 30 days   

 

 Osteo Bi-Flex (5-Loxin) 1,500-400-100 mg-unit-mg oral tablet     2013  
take 1 tablet by oral route daily   

 

 Diovan -12.5 mg oral tablet  2012  TAKE ONE TABLET BY 
MOUTH EVERY DAY  generic on list

 

 Medrol (Lei) 4 mg oral tablets,dose pack  2013  take as 
directed   

 

 nystatin-triamcinolone 100,000-0.1 unit/g-% topical cream  2013  apply to affected area(s) by topical route 2 times a day   

 

 fexofenadine 180 mg oral tablet  2013  take 1 tablet (180 mg) 
by oral route once daily   







Problem List







 Description  Status  Onset

 

 Arthritis unspecified  Active   

 

 Hyperlipidemia  Active   

 

 Hypertension  Active   







Vital Signs







 Date  Time  BP-Sys(mm[Hg]  BP-Edelmira(mm[Hg])  HR(bpm)  RR(rpm)  Temp  WT  HT  HC  
BMI  BSA  BMI Percentile  O2 Sat(%)

 

 10/26/2015  8:35:00 AM  124 mmHg  70 mmHg  80 bpm  18 rpm  97.1 F     64 in   
            

 

 2015  9:50:00 AM  132 mmHg  88 mmHg  90 bpm  22 rpm  98.5 F  387 lbs  64 
in     66.4277 kg/m  2.8154 m     97 %

 

 2014  10:36:00 AM  142 mmHg  80 mmHg  78 bpm  22 rpm  97.9 F  381 lbs  64 
in     65.40 kg/m2  2.79 m2     97 %

 

 2013  8:04:00 AM  138 mmHg  70 mmHg  88 bpm  22 rpm  98.1 F  388 lbs  64 
in     66.5994 kg/m  2.8191 m     98 %

 

 2013  9:54:00 AM  136 mmHg  70 mmHg  88 bpm  24 rpm  97.1 F  386 lbs  64 
in     66.26 kg/m2  2.81 m2     97 %

 

 2013  9:47:00 AM  134 mmHg  78 mmHg  70 bpm  16 rpm  97.8 F  381 lbs  64 
in     65.3978 kg/m  2.7935 m      

 

 2013  10:27:00 AM  136 mmHg  84 mmHg  82 bpm  22 rpm  98.2 F  384.2 lbs  
64 in     65.95 kg/m2  2.81 m2     96 %

 

 2012  7:59:00 AM  138 mmHg  72 mmHg  70 bpm  18 rpm  98 F  377 lbs  64 
in     64.7112 kg/m  2.7788 m      

 

 10/8/2012  8:29:00 AM  138 mmHg  70 mmHg  72 bpm  18 rpm  98.1 F  371 lbs  64 
in     63.68 kg/m2  2.76 m2      

 

 2012  10:57:00 AM  136 mmHg  72 mmHg  68 bpm  18 rpm  98 F  371 lbs  64 
in     63.6814 kg/m  2.7566 m      

 

 2011  8:43:00 AM  144 mmHg  78 mmHg  72 bpm  18 rpm  98.2 F  396 lbs  64 
in     67.97 kg/m2  2.85 m2      

 

 2010  1:51:00 PM  142 mmHg  80 mmHg  76 bpm  16 rpm  98.7 F  382 lbs  64 
in     65.5695 kg/m  2.7972 m      

 

 2009  8:23:00 AM  132 mmHg  78 mmHg  70 bpm  24 rpm  98.3 F  377 lbs  64 
in     64.71 kg/m2  2.78 m2      







Social History







 Name  Description  Comments

 

 Tobacco  Former smoker  for 2 years

 

       







History of Procedures







 Date Ordered  Description  Order Status

 

 10/26/2015 12:00 AM  X-RAY EXAM OF HIP  Reviewed

 

 2011 12:00 AM  COMPREHEN METABOLIC PANEL  Reviewed

 

 2011 12:00 AM  LIPID PANEL  Reviewed

 

 2011 12:00 AM  GLYCOSYLATED HEMOGLOBIN TEST  Reviewed

 

 2011 12:00 AM  CYTOPATH C/V THIN LAYER  Reviewed

 

 2011 12:00 AM  MAMMOGRAM SCREENING  Reviewed

 

 2012 12:00 AM  ASSAY GLUCOSE BLOOD QUANT  Reviewed

 

 2012 12:00 AM  TDAP VACCINE 7 YRS/> IM  Reviewed

 

 2009 12:00 AM  COMPREHEN METABOLIC PANEL  Reviewed

 

 2009 12:00 AM  LIPID PANEL  Reviewed

 

 2009 12:00 AM  GLYCOSYLATED HEMOGLOBIN TEST  Reviewed

 

 2009 12:00 AM  MAMMOGRAM SCREENING  Reviewed

 

 12/10/2012 12:00 AM  COMPREHEN METABOLIC PANEL  Reviewed

 

 12/10/2012 12:00 AM  LIPID PANEL  Reviewed

 

 12/10/2012 12:00 AM  GLYCOSYLATED HEMOGLOBIN TEST  Reviewed

 

 2012 12:00 AM  MAMMOGRAM SCREENING  Reviewed

 

 2012 12:00 AM  ASSAY GLUCOSE BLOOD QUANT  Reviewed

 

 2013 12:00 AM  COMPREHEN METABOLIC PANEL  Reviewed

 

 2013 12:00 AM  LIPID PANEL  Reviewed

 

 2013 12:00 AM  GLYCOSYLATED HEMOGLOBIN TEST  Reviewed

 

 2013 12:00 AM  CYTOPATH C/V THIN LAYER  Reviewed

 

 2013 12:00 AM  MAMMOGRAM SCREENING  Reviewed

 

 2009 12:00 AM  CYTOPATH C/V THIN LAYER  Reviewed

 

 2010 12:00 AM  COMPREHEN METABOLIC PANEL  Reviewed

 

 2010 12:00 AM  LIPID PANEL  Reviewed

 

 2010 12:00 AM  GLYCOSYLATED HEMOGLOBIN TEST  Reviewed

 

 2010 12:00 AM  CYTOPATH C/V THIN LAYER  Reviewed

 

 2010 12:00 AM  MAMMOGRAM SCREENING  Reviewed

 

 2014 12:00 AM  COMPREHEN METABOLIC PANEL  Reviewed

 

 2014 12:00 AM  LIPID PANEL  Reviewed

 

 2014 12:00 AM  GLYCOSYLATED HEMOGLOBIN TEST  Reviewed







Results Summary







 Data and Description  Results

 

 2009 10:01 AM  .0 mg/dLCHOLESTEROL 258 mg/dLTRIGLYCERIDES 252.0 mg
/dLHDL 51.0 mg/dLLDL 153.0 mg/dLTRIGLYCERIDES 252.0 mg/dLHDL 51.0 mg/
dLCHOLESTEROL 258 mg/dLGLUCOSE 116 SODIUM 144.0 mmol/LPOTASSIUM 4.80 mmol/
LCHLORIDE 105.0 mmol/LCO2 24.0 mmol/LBUN 18.0 mg/dLCREATININE 0.80 mg/dLSGOT/
AST 23.0 IU/LSGPT/ALT 26.0 IU/LALK PHOS 98.0 IU/LTOTAL PROTEIN 7.30 g/dLALBUMIN 
4.20 g/dLTOTAL BILI 0.40 mg/dLCALCIUM 10.30 mg/dLeGFR >60 mL/minGLUCOSE 116 
SODIUM 144.0 mmol/LPOTASSIUM 4.80 mmol/LCHLORIDE 105.0 mmol/LCO2 24.0 mmol/LBUN 
18.0 mg/dLCREATININE 0.80 mg/dLSGOT/AST 23.0 IU/LSGPT/ALT 26.0 IU/LALK PHOS 
98.0 IU/LTOTAL PROTEIN 7.30 g/dLALBUMIN 4.20 g/dLTOTAL BILI 0.40 mg/dLCALCIUM 
10.30 mg/dLeGFR >60 mL/min

 

 2011 9:00 AM  GLUCOSE 130.0 mg/dLSODIUM 139.0 mmol/LPOTASSIUM 4.40 mmol/
LCHLORIDE 104.0 mmol/LCO2 26.0 mmol/LBUN 21.0 mg/dLCREATININE 0.80 mg/dLSGOT/
AST 19.0 IU/LSGPT/ALT 25.0 IU/LALK PHOS 108.0 IU/LTOTAL PROTEIN 7.10 g/
dLALBUMIN 4.20 g/dLTOTAL BILI 0.40 mg/dLCALCIUM 10.20 mg/dLeGFR >60 mL/min/1.73 
[id number] 227.0 mg/dLCHOLESTEROL 282.0 mg/dLHDL 45.0 mg/dLLDL (CALC) 
192.0 mg/dL

 

 2012 9:42 AM  GLUCOSE 131.0 mg/dL

 

 2012 10:02 AM  GLUCOSE 150.0 mg/dLSODIUM 140.0 mmol/LPOTASSIUM 4.60 mmol/
LCHLORIDE 104.0 mmol/LCO2 25.0 mmol/LBUN 21.0 mg/dLCREATININE 0.90 mg/dLSGOT/
AST 19.0 IU/LSGPT/ALT 26.0 IU/LALK PHOS 99.0 IU/LTOTAL PROTEIN 7.10 g/dLALBUMIN 
4.20 g/dLTOTAL BILI 0.50 mg/dLCALCIUM 10.80 mg/dLeGFR 60 TRIGLYCERIDES 210.0 mg/
dLCHOLESTEROL 241.0 mg/dLHDL 48.0 mg/dLLDL (CALC) 151.0 mg/dL

 

 2012 8:54 AM  GLUCOSE 125.0 mg/dL

 

 12/3/2013 8:58 AM  TRIGLYCERIDES 210.0 mg/dLCHOLESTEROL 211.0 mg/dLHDL 49.0 mg/
dLLDL (CALC) 120.0 mg/dLGLUCOSE 125.0 mg/dLSODIUM 140.0 mmol/LPOTASSIUM 4.20 
mmol/LCHLORIDE 105.0 mmol/LCO2 25.0 mmol/LBUN 24.0 mg/dLCREATININE 0.90 mg/
dLSGOT/AST 23.0 IU/LSGPT/ALT 28.0 IU/LALK PHOS 111.0 IU/LTOTAL PROTEIN 7.0 g/
dLALBUMIN 4.30 g/dLTOTAL BILI 0.60 mg/dLCALCIUM 10.60 mg/dLeGFR >60 mL/min/1.73 
m2Est Avg Glucose 151.3 mg/dL

 

 2014 9:05 AM  TRIGLYCERIDES 210.0 mg/dLCHOLESTEROL 204.0 mg/dLHDL 51.0 mg/
dLLDL (CALC) 111.0 mg/dLGLUCOSE 120.0 mg/dLSODIUM 141.0 mmol/LPOTASSIUM 4.40 
mmol/LCHLORIDE 103.0 mmol/LCO2 25.0 mmol/LBUN 18.0 mg/dLCREATININE 0.90 mg/
dLSGOT/AST 24.0 IU/LSGPT/ALT 29.0 IU/LALK PHOS 98.0 IU/LTOTAL PROTEIN 7.0 g/
dLALBUMIN 4.10 g/dLTOTAL BILI 0.60 mg/dLCALCIUM 10.20 mg/dLeGFR 60 Est Avg 
Glucose 148.5 mg/dL







History Of Immunizations







 Name  Date Admin  Mfg Name  Mfg Code  Trade Name  Lot#  Route  Inj  Vis Given  
Vis Pub  CVX

 

 Tdap  2012  Tener[employer]  SKB  ADACEL  [id number]  Intramuscular  Right 
Deltoid  2008  115







History of Past Illness







 Name  Date of Onset  Comments

 

 Hypertension  Dec  7 2009  1:45PM   

 

 Glucose Intolerance  Dec  7 2009  1:45PM   

 

 Hyperlipidemia  Dec  7 2009  1:45PM   

 

 Screening Mammogram  Dec  7 2009  1:45PM   

 

 Routine gynecological examination  Dec 21 2009  8:25AM   

 

 Hypertension Stable  Dec 21 2009  8:25AM   

 

 Hyperlipidemia, unspecified Stable  Dec 21 2009  8:25AM   

 

 Arthritis unspecified      

 

 Hyperlipidemia      

 

 Hypertension      

 

 Hypertension  Dec 17 2010  1:41PM   

 

 Glucose Intolerance  Dec 17 2010  1:41PM   

 

 Hyperlipidemia  Dec 17 2010  1:41PM   

 

 Hyperglycemia  Dec 17 2010  1:41PM   

 

 Routine gynecological examination  Dec 20 2010  1:52PM   

 

 Overactive bladder  Dec 20 2010  1:52PM   

 

 Hypertension  2011 12:05PM   

 

 Glucose Intolerance  2011 12:05PM   

 

 Hyperlipidemia  2011 12:05PM   

 

 Hyperglycemia  2011 12:05PM   

 

 Routine gynecological examination  Dec  8 2011  8:45AM   

 

 Overactive bladder  Dec  8 2011  8:45AM   

 

 Screening Examination for Breast Cancer  Dec  8 2011  8:45AM   

 

 Hyperglycemia  Dec  8 2011  8:45AM   

 

 Laceration 2nd digit right hand  May 21 2012 10:59AM   

 

 Acute Pharyngitis  Oct  8 2012  8:30AM   

 

 Hypertension  Dec 10 2012 11:59AM   

 

 Glucose Intolerance  Dec 10 2012 11:59AM   

 

 Hyperlipidemia  Dec 10 2012 11:59AM   

 

 Hyperglycemia  Dec 10 2012 11:59AM   

 

 Screening Examination for Breast Cancer  Dec 19 2012  8:02AM   

 

 Hypertension  Dec 19 2012  8:02AM   

 

 Hyperlipidemia, Mixed  Dec 19 2012  8:02AM   

 

 Hyperglycemia  Dec 19 2012  8:02AM   

 

 Upper Respiratory Infections  May 23 2013 10:32AM   

 

 Eczema  Aug 14 2013  9:49AM   

 

 Eustachian Tube Dysfunction, Bilateral  Aug 14 2013  9:49AM   

 

 Worried well  Sep 16 2013  9:56AM   

 

 Hypertension  Dec  2 2013 11:57AM   

 

 Glucose Intolerance  Dec  2 2013 11:57AM   

 

 Hyperlipidemia  Dec  2 2013 11:57AM   

 

 Hyperglycemia  Dec  2 2013 11:57AM   

 

 Routine gynecological examination  Dec 17 2013  8:06AM   

 

 Screening Examination for Breast Cancer  Dec 17 2013  8:06AM   

 

 Hypertension  2014 11:58AM   

 

 Glucose Intolerance  2014 11:58AM   

 

 Hyperlipidemia  2014 11:58AM   

 

 Hyperglycemia  2014 11:58AM   

 

 Essential Hypertension  2014 10:39AM   

 

 Diabetes Mellitus, Type II  2014 10:39AM   

 

 Hyperlipidemia, Mixed  2014 10:39AM   

 

 Sinusitis, Acute  Sep 14 2015  9:52AM   

 

 Bronchitis, Acute  Sep 14 2015  9:52AM   

 

 Arthritis unspecified  Oct 26 2015  8:37AM   







Payers







 Insurance Name  Company Name  Plan Name  Plan Number  Policy Number  Policy 
Group Number  Start Date

 

    Medicare Part A  Medicare Part A     [id number]     2008

 

    Medicare Part B  [employer] [place]     [id number]     2008







History of Encounters







 Visit Date  Visit Type  Provider

 

 10/26/2015  Office visit  Brain Hauser DO

 

 2015  Office visit  Brain Hauser DO

 

 2014  Office visit  Brain Hauser DO

 

 2013  Office visit  Brain Hauser DO

 

 2013  Office visit  Brain Hauser DO

 

 2013  Office visit  Brain Hauser DO

 

 2013  Office visit  Margie RODAS

 

 2012  Office visit  Brain Hauser DO

 

 10/8/2012  Office visit  Brain Hauser DO

 

 2012  Office visit  Brain Hauser DO

 

 2011  Office visit  Brain Hauser DO

 

 2010  Office visit  Brain Hauser DO

 

 2009  Office visit  Brain Hauser DO

 

 2009  Office visit  Brain Hauser DO

## 2019-02-11 NOTE — XMS REPORT
Continuity of Care Document

 Created on: 2019



Elena Deluca

External Reference #: [id number]

: 1955

Sex: Female



Demographics







 Address  [address] [place]  [address]

 

 Home Phone  [phone] x

 

 Preferred Language  Unknown

 

 Marital Status  Unknown

 

 Yazidi Affiliation  Unknown

 

 Race  Unknown

 

 Ethnic Group  Unknown





Author







 Author  [facility]

 

 Organization  [facility]

 

 Address  Unknown

 

 Phone  Unavailable



              



Allergies

      





 Active            Description            Code            Type            
Severity            Reaction            Onset            Reported/Identified   
         Relationship to Patient            Clinical Status        

 

 Yes            No Known Allergies            [id number]                         N
/A            N/A                                                            



                  



Medications

      



There is no data.                  



Problems

      



There is no data.                  



Procedures

      



There is no data.                  



Results

      



There is no data.              



Encounters

      





 ACCT No.            Visit Date/Time            Discharge            Status    
        Pt. Type            Provider            Facility            Loc./Unit  
          Complaint        

 

 [id number]            2018 08:54:46            2018 23:59:59          
  Brain Azul                        
                       

 

 [id number]            10/12/2017 08:59:55            10/12/2017 23:59:59          
  Brain Azul                        
                       

 

 [id number]            2017 10:42:27            2017 23:59:59          
  [facility]            Outpatient            Brain Hauser                        
                       

 

 [id number]            2017 10:03:10            2017 23:59:59          
  [facility]            Outpatient            Brain Hauser                        
                       

 

 [id number]            2016 08:50:38            2016 23:59:59          
  [facility]            Outpatient            Brain Hauser                        
                       

 

 [id number]            10/26/2015 09:23:33            10/26/2015 23:59:59          
  Brain Azul                        
                       

 

 [id number]            2015 10:44:50            2015 23:59:59          
  [facility]            Outpatient            Brain Hauser                        
                       

 

 [id number]            2014 11:01:48            2014 23:59:59          
  Brain Azul                        
                       

 

 [id number]            2018 12:52:16                                      
Document Registration

## 2019-02-11 NOTE — XMS REPORT
MU2 Ambulatory Summary

 Created on: 2018



Elena Deluca

External Reference #: [id number]

: 1955

Sex: Female



Demographics







 Address  [address] [place]  [address]

 

 Home Phone  [phone]

 

 Preferred Language  English

 

 Marital Status  

 

 Pentecostal Affiliation  Unknown

 

 Race  White

 

 Ethnic Group  Not  or 





Author







 Author  Brain Hauser

 

 [facility]

 

 Address  [address]

[place], [place]  [id number]



 

 Phone  [phone]







Care Team Providers







 Care Team Member Name  Role  Phone

 

 Brain Hauser  PCP  [phone]

 

 Brain Hauser  PreferredProvider  [phone]







Allergies and Adverse Reactions







 Name  Reaction  Notes

 

 NO KNOWN DRUG ALLERGIES      







Plan of Treatment







 Planned Activity  Comments  Planned Date  Planned Time  Plan/Goal

 

 Discuss Bariatric Surgery options. She needs to lose 100 lbs in order to have 
Left Total Knee Replacement.            







Medications







 Active 

 

 Name  Start Date  Estimated Completion Date  SIG  Comments

 

 Multivitamin Oral Tablet        take 1 tablet by oral route daily   

 

 aspirin 81 mg oral tablet        take 1 tablet (81 mg) by oral route once 
daily   

 

 Fish Oil 1,000 mg oral capsule  2010     take 2 capsules by oral route 
daily   

 

 valsartan-hydrochlorothiazide 160-12.5 mg oral tablet  2014     TAKE ONE 
TABLET BY MOUTH EVERY DAY   

 

 simvastatin 20 mg oral tablet  2014     TAKE ONE TABLET BY MOUTH EVERY 
DAY IN THE EVENING   

 

 simvastatin 20 mg oral tablet  2014     TAKE ONE TABLET BY MOUTH EVERY 
DAY IN THE EVENING   

 

 valsartan-hydrochlorothiazide 160-12.5 mg oral tablet  2015     TAKE ONE 
TABLET BY MOUTH EVERY DAY   

 

 simvastatin 20 mg oral tablet  2015     TAKE ONE TABLET BY MOUTH ONCE 
DAILY AT BEDTIME   

 

 valsartan-hydrochlorothiazide 160-12.5 mg oral tablet  2015     TAKE ONE 
TABLET BY MOUTH ONCE DAILY   

 

 valsartan-hydrochlorothiazide 160-12.5 mg oral tablet  8/3/2015     TAKE ONE 
TABLET BY MOUTH ONCE DAILY   

 

 simvastatin 20 mg oral tablet  2015     TAKE ONE TABLET BY MOUTH ONCE 
DAILY AT BEDTIME   

 

 amlodipine 10 mg oral tablet  10/18/2015     TAKE ONE TABLET BY MOUTH ONCE 
DAILY   

 

 valsartan-hydrochlorothiazide 160-12.5 mg oral tablet  2016     TAKE ONE 
TABLET BY MOUTH ONCE DAILY   

 

 simvastatin 20 mg oral tablet  2016     TAKE ONE TABLET BY MOUTH ONCE 
DAILY AT BEDTIME   

 

 valsartan-hydrochlorothiazide 160-12.5 mg oral tablet  2016     TAKE ONE 
TABLET BY MOUTH ONCE DAILY   

 

 simvastatin 20 mg oral tablet  2016     TAKE ONE TABLET BY MOUTH ONCE 
DAILY AT BEDTIME   

 

 amlodipine 10 mg oral tablet  10/9/2016     TAKE ONE TABLET BY MOUTH ONCE 
DAILY   

 

 amlodipine 10 mg oral tablet  2017     TAKE ONE TABLET BY MOUTH ONCE DAILY
   

 

 valsartan-hydrochlorothiazide 160-12.5 mg oral tablet  2017     TAKE ONE 
TABLET BY MOUTH ONCE DAILY   

 

 magnesium 250 mg oral tablet        take 1 tablet by oral route daily   

 

 potassium 99 mg oral tablet        take 1 tablet by oral route daily   

 

 ibuprofen 800 mg oral tablet  8/15/2018     TAKE 1 TABLET BY MOUTH THREE TIMES 
DAILY AS NEEDED WITH FOOD   

 

 irbesartan-hydrochlorothiazide 300-12.5 mg oral tablet  2018  
take 1 tablet by oral route once daily for 90 days   









  

 

 Name  Start Date  Expiration Date  SIG  Comments

 

 Augmentin 500-125 mg oral tablet  10/8/2012  10/15/2012  take 1 tablet by oral 
route every 12 hours for 7 days   

 

 Diflucan 100 mg oral tablet  10/8/2012  10/11/2012  take 1 tablet (100 mg) by 
oral route once daily   

 

 amoxicillin 500 mg oral capsule  2013  take 1 capsule (500 mg) 
by oral route 3 times per day for 10 days   

 

 simvastatin 20 mg oral tablet  9/10/2013  10/10/2013  TAKE ONE TABLET BY MOUTH 
EVERY DAY IN THE EVENING   

 

 amlodipine 10 mg oral tablet  2014  TAKE ONE TABLET BY MOUTH 
EVERY DAY   

 

 Augmentin 500-125 mg oral tablet  2015  take 1 tablet by oral 
route every 12 hours for 7 days   

 

 clindamycin HCl 300 mg oral capsule  3/27/2017  4/3/2017  take 1 capsule (300 
mg) by oral route 2 times per day for 7 days   

 

 simvastatin 20 mg oral tablet  2017  TAKE ONE TABLET BY 
MOUTH ONCE DAILY AT BEDTIME   

 

 amlodipine 10 mg oral tablet  2018  TAKE ONE TABLET BY MOUTH 
ONCE DAILY   









 Discontinued 

 

 Name  Start Date  Discontinued Date  SIG  Comments

 

 niacin 500 mg oral tablet extended release 24 hr     2011  take 1 tablets 
(500 mg) by oral route once daily  Starting on Simvastatin

 

 Calcium 600mg     2018  one tablet daily   

 

 Vesicare 5 mg oral tablet  2011  take 1 tablet by oral route 
daily for 30 days   

 

 Osteo Bi-Flex (5-Loxin) 1,500-400-100 mg-unit-mg oral tablet     2013  
take 1 tablet by oral route daily   

 

 Diovan -12.5 mg oral tablet  2012  TAKE ONE TABLET BY 
MOUTH EVERY DAY  generic on list

 

 Medrol (Lei) 4 mg oral tablets,dose pack  2013  take as 
directed   

 

 nystatin-triamcinolone 100,000-0.1 unit/g-% topical cream  2013  apply to affected area(s) by topical route 2 times a day   

 

 fexofenadine 180 mg oral tablet  2013  take 1 tablet (180 mg) 
by oral route once daily   

 

 Osteo Bi-Flex 250-200 mg oral tablet     2018  take 1 tablet by oral route 
daily   

 

 prednisone 20 mg oral tablet  10/26/2015  2016  take 3 tablets (60 mg) by 
oral route once daily for 3 days then 2 tablets (40 mg) daily for 2 days   

 

 cyclobenzaprine 10 mg oral tablet  10/30/2015  2016  take 1 tablet (10 mg
) by oral route 3 times per day   

 

 promethazine-codeine 6.25-10 mg/5 mL oral syrup  3/27/2017  10/12/2017  take 5 
milliliters by oral route every 6 hours as needed, not to exceed 30 mL in 24 
hours   

 

 cyclobenzaprine 10 mg oral tablet  10/12/2017  2018  take 1 tablet (10 mg) 
by oral route at bedtime   

 

 valsartan-hydrochlorothiazide 160-12.5 mg oral tablet  2018  
TAKE ONE TABLET BY MOUTH ONCE DAILY for 90 days   recall

 

 olmesartan-hydrochlorothiazide 40-12.5 mg oral tablet  2018  
take 1 tablet by oral route once daily for 90 days   

 

 losartan-hydrochlorothiazide 100-12.5 mg oral tablet  2018  
take 1 tablet by oral route once daily for 90 days   







Problem List







 Description  Status  Onset

 

 Arthritis unspecified  Active   

 

 Hyperlipidemia  Active   

 

 Hypertension  Active   







Vital Signs







 Date  Time  BP-Sys(mm[Hg]  BP-Edelmira(mm[Hg])  HR(bpm)  RR(rpm)  Temp  WT  HT  HC  
BMI  BSA  BMI Percentile  O2 Sat(%)

 

 2018  8:02:00 AM  122 mmHg  60 mmHg  74 bpm  22 rpm  98.2 F  366 lbs  64 
in     62.8231 kg/m  2.738 m     97 %

 

 10/12/2017  8:06:00 AM  144 mmHg  82 mmHg  77 bpm  16 rpm  97.7 F  398 lbs  64 
in     68.32 kg/m2  2.86 m2     97 %

 

 3/27/2017  9:55:00 AM  127 mmHg  72 mmHg  76 bpm  22 rpm  98.1 F  393.5 lbs  
63 in     69.7047 kg/m  2.8167 m     97 %

 

 2017  9:36:00 AM  132 mmHg  72 mmHg  76 bpm  20 rpm  97.2 F  396.25 lbs  
64 in     68.02 kg/m2  2.85 m2     97 %

 

 2016  7:58:00 AM  136 mmHg  70 mmHg  85 bpm  20 rpm  98.1 F  391 lbs  64 
in     67.1143 kg/m  2.8299 m     95 %

 

 10/26/2015  8:35:00 AM  124 mmHg  70 mmHg  80 bpm  18 rpm  97.1 F     64 in   
            

 

 2015  9:50:00 AM  132 mmHg  88 mmHg  90 bpm  22 rpm  98.5 F  387 lbs  64 
in     66.43 kg/m2  2.82 m2     97 %

 

 2014  10:36:00 AM  142 mmHg  80 mmHg  78 bpm  22 rpm  97.9 F  381 lbs  64 
in     65.3978 kg/m  2.7935 m     97 %

 

 2013  8:04:00 AM  138 mmHg  70 mmHg  88 bpm  22 rpm  98.1 F  388 lbs  64 
in     66.60 kg/m2  2.82 m2     98 %

 

 2013  9:54:00 AM  136 mmHg  70 mmHg  88 bpm  24 rpm  97.1 F  386 lbs  64 
in     66.2561 kg/m  2.8118 m     97 %

 

 2013  9:47:00 AM  134 mmHg  78 mmHg  70 bpm  16 rpm  97.8 F  381 lbs  64 
in     65.40 kg/m2  2.79 m2      

 

 2013  10:27:00 AM  136 mmHg  84 mmHg  82 bpm  22 rpm  98.2 F  384.2 lbs  
64 in     65.9471 kg/m  2.8052 m     96 %

 

 2012  7:59:00 AM  138 mmHg  72 mmHg  70 bpm  18 rpm  98 F  377 lbs  64 
in     64.71 kg/m2  2.78 m2      

 

 10/8/2012  8:29:00 AM  138 mmHg  70 mmHg  72 bpm  18 rpm  98.1 F  371 lbs  64 
in     63.6814 kg/m  2.7566 m      

 

 2012  10:57:00 AM  136 mmHg  72 mmHg  68 bpm  18 rpm  98 F  371 lbs  64 
in     63.68 kg/m2  2.76 m2      

 

 2011  8:43:00 AM  144 mmHg  78 mmHg  72 bpm  18 rpm  98.2 F  396 lbs  64 
in     67.9725 kg/m  2.848 m      

 

 2010  1:51:00 PM  142 mmHg  80 mmHg  76 bpm  16 rpm  98.7 F  382 lbs  64 
in     65.57 kg/m2  2.80 m2      

 

 2009  8:23:00 AM  132 mmHg  78 mmHg  70 bpm  24 rpm  98.3 F  377 lbs  64 
in     64.7112 kg/m  2.7788 m      







Social History







 Name  Description  Comments

 

 Tobacco  Former smoker  for 2 years

 

       







History of Procedures







 Date Ordered  Description  Order Status

 

 10/26/2015 12:00 AM  X-RAY EXAM OF HIP  Reviewed

 

 10/26/2015 12:00 AM  X-RAY EXAM OF KNEE 3  Reviewed

 

 2011 12:00 AM  COMPREHEN METABOLIC PANEL  Reviewed

 

 2011 12:00 AM  LIPID PANEL  Reviewed

 

 2011 12:00 AM  GLYCOSYLATED HEMOGLOBIN TEST  Reviewed

 

 2011 12:00 AM  CYTOPATH C/V THIN LAYER  Reviewed

 

 2011 12:00 AM  MAMMOGRAM SCREENING  Reviewed

 

 2012 12:00 AM  ASSAY GLUCOSE BLOOD QUANT  Reviewed

 

 10/20/2016 12:00 AM  COMPREHEN METABOLIC PANEL  Reviewed

 

 10/20/2016 12:00 AM  LIPID PANEL  Reviewed

 

 10/20/2016 12:00 AM  GLYCOSYLATED HEMOGLOBIN TEST  Reviewed

 

 10/20/2016 12:00 AM  MICROALBUMIN QUANTITATIVE  Reviewed

 

 10/20/2016 12:00 AM  VITAMIN B-12  Reviewed

 

 2017 12:00 AM  RADEX SHOULDER COMPLETE MINIMUM 2 VIEWS  Reviewed

 

 2017 12:00 AM  RADEX HUMERUS MINIMUM 2 VIEWS  Reviewed

 

 2017 12:00 AM  Decadron 8mg Injection  Reviewed

 

 2017 12:00 AM  Depo-Medrol 80mg Injection  Reviewed

 

 2012 12:00 AM  TDAP VACCINE 7 YRS/> IM  Reviewed

 

 2009 12:00 AM  COMPREHEN METABOLIC PANEL  Reviewed

 

 2009 12:00 AM  LIPID PANEL  Reviewed

 

 2009 12:00 AM  GLYCOSYLATED HEMOGLOBIN TEST  Reviewed

 

 2009 12:00 AM  MAMMOGRAM SCREENING  Reviewed

 

 12/10/2012 12:00 AM  COMPREHEN METABOLIC PANEL  Reviewed

 

 12/10/2012 12:00 AM  LIPID PANEL  Reviewed

 

 12/10/2012 12:00 AM  GLYCOSYLATED HEMOGLOBIN TEST  Reviewed

 

 2012 12:00 AM  MAMMOGRAM SCREENING  Reviewed

 

 2012 12:00 AM  ASSAY GLUCOSE BLOOD QUANT  Reviewed

 

 3/19/2018 12:00 AM  COMPLETE CBC W/AUTO DIFF WBC  Reviewed

 

 3/19/2018 12:00 AM  COMPREHEN METABOLIC PANEL  Reviewed

 

 3/19/2018 12:00 AM  LIPID PANEL  Reviewed

 

 3/19/2018 12:00 AM  GLYCOSYLATED HEMOGLOBIN TEST  Reviewed

 

 2013 12:00 AM  COMPREHEN METABOLIC PANEL  Reviewed

 

 2013 12:00 AM  LIPID PANEL  Reviewed

 

 2013 12:00 AM  GLYCOSYLATED HEMOGLOBIN TEST  Reviewed

 

 2013 12:00 AM  CYTOPATH C/V THIN LAYER  Reviewed

 

 2013 12:00 AM  MAMMOGRAM SCREENING  Reviewed

 

 2009 12:00 AM  CYTOPATH C/V THIN LAYER  Reviewed

 

 2010 12:00 AM  COMPREHEN METABOLIC PANEL  Reviewed

 

 2010 12:00 AM  LIPID PANEL  Reviewed

 

 2010 12:00 AM  GLYCOSYLATED HEMOGLOBIN TEST  Reviewed

 

 2010 12:00 AM  CYTOPATH C/V THIN LAYER  Reviewed

 

 2010 12:00 AM  MAMMOGRAM SCREENING  Reviewed

 

 2014 12:00 AM  COMPREHEN METABOLIC PANEL  Reviewed

 

 2014 12:00 AM  LIPID PANEL  Reviewed

 

 2014 12:00 AM  GLYCOSYLATED HEMOGLOBIN TEST  Reviewed







Results Summary







 Date and Description  Results

 

 2011 9:00 AM  GLUCOSE 130.0 mg/dLSODIUM 139.0 mmol/LPOTASSIUM 4.40 mmol/
LCHLORIDE 104.0 mmol/LCO2 26.0 mmol/LBUN 21.0 mg/dLCREATININE 0.80 mg/dLSGOT/
AST 19.0 IU/LSGPT/ALT 25.0 IU/LALK PHOS 108.0 IU/LTOTAL PROTEIN 7.10 g/
dLALBUMIN 4.20 g/dLTOTAL BILI 0.40 mg/dLCALCIUM 10.20 mg/dLAGE 56 GFR NonAA 74 
GFR AA 90 eGFR >60 mL/min/1.73 m2eGFR AA* >60 TRIGLYCERIDES 227.0 mg/
dLCHOLESTEROL 282.0 mg/dLHDL 45.0 mg/dLTOT CHOL/HDL 6.3 LDL (CALC) 192.0 mg/
dLGLYCOHEMOGLOBIN A1C 6.10 %

 

 2012 9:42 AM  GLUCOSE 131.0 mg/dL

 

 2012 10:02 AM  GLUCOSE 150.0 mg/dLSODIUM 140.0 mmol/LPOTASSIUM 4.60 mmol/
LCHLORIDE 104.0 mmol/LCO2 25.0 mmol/LBUN 21.0 mg/dLCREATININE 0.90 mg/dLSGOT/
AST 19.0 IU/LSGPT/ALT 26.0 IU/LALK PHOS 99.0 IU/LTOTAL PROTEIN 7.10 g/dLALBUMIN 
4.20 g/dLTOTAL BILI 0.50 mg/dLCALCIUM 10.80 mg/dLAGE 57 GFR NonAA 65 GFR AA 79 
eGFR 60 eGFR AA* 60 TRIGLYCERIDES 210.0 mg/dLCHOLESTEROL 241.0 mg/dLHDL 48.0 mg/
dLTOT CHOL/HDL 5.0 LDL (CALC) 151.0 mg/dLGLYCOHEMOGLOBIN A1C 6.30 %

 

 2012 8:54 AM  GLUCOSE 125.0 mg/dL

 

 12/3/2013 8:58 AM  TRIGLYCERIDES 210.0 mg/dLCHOLESTEROL 211.0 mg/dLHDL 49.0 mg/
dLTOT CHOL/HDL 4.3 LDL (CALC) 120.0 mg/dLGLUCOSE 125.0 mg/dLSODIUM 140.0 mmol/
LPOTASSIUM 4.20 mmol/LCHLORIDE 105.0 mmol/LCO2 25.0 mmol/LBUN 24.0 mg/
dLCREATININE 0.90 mg/dLSGOT/AST 23.0 IU/LSGPT/ALT 28.0 IU/LALK PHOS 111.0 IU/
LTOTAL PROTEIN 7.0 g/dLALBUMIN 4.30 g/dLTOTAL BILI 0.60 mg/dLCALCIUM 10.60 mg/
dLAGE 58 GFR NonAA 64 GFR AA 78 eGFR >60 mL/min/1.73 m2eGFR AA* >60 HGB A1C 
6.90 %Est Avg Glucose 151.3 mg/dL

 

 2014 9:05 AM  TRIGLYCERIDES 210.0 mg/dLCHOLESTEROL 204.0 mg/dLHDL 51.0 mg/
dLTOT CHOL/HDL 4.0 LDL (CALC) 111.0 mg/dLGLUCOSE 120.0 mg/dLSODIUM 141.0 mmol/
LPOTASSIUM 4.40 mmol/LCHLORIDE 103.0 mmol/LCO2 25.0 mmol/LBUN 18.0 mg/
dLCREATININE 0.90 mg/dLSGOT/AST 24.0 IU/LSGPT/ALT 29.0 IU/LALK PHOS 98.0 IU/
LTOTAL PROTEIN 7.0 g/dLALBUMIN 4.10 g/dLTOTAL BILI 0.60 mg/dLCALCIUM 10.20 mg/
dLAGE 59 GFR NonAA 64 GFR AA 78 eGFR 60 eGFR AA* 60 HGB A1C 6.80 %Est Avg 
Glucose 148.5 mg/dL

 

 10/24/2016 10:22 AM  HGB A1C 6.50 %Est Avg Glucose 139.9 mg/dLGLUCOSE 129.0 mg/
dLSODIUM 143.0 mmol/LPOTASSIUM 4.40 mmol/LCHLORIDE 104.0 mmol/LCO2 24.0 mmol/
LBUN 25.0 mg/dLCREATININE 0.90 mg/dLSGOT/AST 25.0 IU/LSGPT/ALT 35.0 IU/LALK 
PHOS 96.0 IU/LTOTAL PROTEIN 7.10 g/dLALBUMIN 4.20 g/dLTOTAL BILI 0.50 mg/
dLCALCIUM 10.30 mg/dLAGE 61 GFR NonAA 64 GFR AA 78 eGFR >60 mL/min/1.73meGFR 
AA* >60 TRIGLYCERIDES 181.0 mg/dLCHOLESTEROL 201.0 mg/dLHDL 47.0 mg/dLTOT CHOL/
HDL 4.3 LDL (CALC) 118.0 mg/dLMICROALBUMIN UR <0.5 ug/mLVITAMIN B12 485.0 pg/mL

 

 3/24/2018 1:00 PM  GLUCOSE 123.0 mg/dLSODIUM 140.0 mmol/LPOTASSIUM 4.20 mmol/
LCHLORIDE 104.0 mmol/LCO2 26.0 mmol/LBUN 30.0 mg/dLCREATININE 1.10 mg/dLSGOT/
AST 23.0 IU/LSGPT/ALT 28.0 IU/LALK PHOS 93.0 IU/LTOTAL PROTEIN 7.60 g/dLALBUMIN 
4.30 g/dLTOTAL BILI 0.50 mg/dLCALCIUM 11.20 mg/dLAGE 63 GFR NonAA 50 GFR AA 61 
eGFR 50 eGFR AA* >60 WBC 8.3 RBC 4.33 HGB 13.20 g/dLHCT 40.30 %MCV 93.0 fLMCH 
30.50 pgMCHC 32.80 g/dLRDW SD 44 RDW CV 12.80 %MPV 10.30 fLPLT 300 NRBC# 0.00 
NRBC% 0.0 %NEUT 66.90 %%LYMP 23.50 %%MONO 7.60 %%EOS 1.60 %%BASO 0.20 %#NEUT 
5.54 #LYMP 1.95 #MONO 0.63 #EOS 0.13 #BASO 0.02 MANUAL DIFF NOT IND HGB A1C 
5.80 %Est Avg Glucose 119.8 mg/dLTRIGLYCERIDES 154.0 mg/dLCHOLESTEROL 186.0 mg/
dLHDL 46.0 mg/dLTOT CHOL/HDL 4.0 LDL (CALC) 109.0 mg/dL







History Of Immunizations







 Name  Date Admin  Mfg Name  Mfg Code  Trade Name  Lot#  Route  Inj  Vis Given  
Vis Pub  CVX

 

 Tdap  2012  [employer]  SKB  ADACEL  [id number]  Intramuscular  Right 
Deltoid  2008  115

 

 Influenza  2015  Not Entered  NE  AFLURIA     Not Entered  Not Entered  1  141

 

 Influenza  2016  Not Entered  NE  AFLURIA     Not Entered  Not Entered  1  141







History of Past Illness







 Name  Date of Onset  Comments

 

 Hypertension  Dec  7 2009  1:45PM   

 

 Glucose Intolerance  Dec  7 2009  1:45PM   

 

 Hyperlipidemia  Dec  7 2009  1:45PM   

 

 Screening Mammogram  Dec  7 2009  1:45PM   

 

 Routine gynecological examination  Dec 21 2009  8:25AM   

 

 Hypertension Stable  Dec 21 2009  8:25AM   

 

 Hyperlipidemia, unspecified Stable  Dec 21 2009  8:25AM   

 

 Arthritis unspecified      

 

 Hyperlipidemia      

 

 Hypertension      

 

 Hypertension  Dec 17 2010  1:41PM   

 

 Glucose Intolerance  Dec 17 2010  1:41PM   

 

 Hyperlipidemia  Dec 17 2010  1:41PM   

 

 Hyperglycemia  Dec 17 2010  1:41PM   

 

 Routine gynecological examination  Dec 20 2010  1:52PM   

 

 Overactive bladder  Dec 20 2010  1:52PM   

 

 Hypertension  2011 12:05PM   

 

 Glucose Intolerance  2011 12:05PM   

 

 Hyperlipidemia  2011 12:05PM   

 

 Hyperglycemia  2011 12:05PM   

 

 Routine gynecological examination  Dec  8 2011  8:45AM   

 

 Overactive bladder  Dec  8 2011  8:45AM   

 

 Screening Examination for Breast Cancer  Dec  8 2011  8:45AM   

 

 Hyperglycemia  Dec  8 2011  8:45AM   

 

 Laceration 2nd digit right hand  May 21 2012 10:59AM   

 

 Acute Pharyngitis  Oct  8 2012  8:30AM   

 

 Hypertension  Dec 10 2012 11:59AM   

 

 Glucose Intolerance  Dec 10 2012 11:59AM   

 

 Hyperlipidemia  Dec 10 2012 11:59AM   

 

 Hyperglycemia  Dec 10 2012 11:59AM   

 

 Screening Examination for Breast Cancer  Dec 19 2012  8:02AM   

 

 Hypertension  Dec 19 2012  8:02AM   

 

 Hyperlipidemia, Mixed  Dec 19 2012  8:02AM   

 

 Hyperglycemia  Dec 19 2012  8:02AM   

 

 Upper Respiratory Infections  May 23 2013 10:32AM   

 

 Eczema  Aug 14 2013  9:49AM   

 

 Eustachian Tube Dysfunction, Bilateral  Aug 14 2013  9:49AM   

 

 Worried well  Sep 16 2013  9:56AM   

 

 Hypertension  Dec  2 2013 11:57AM   

 

 Glucose Intolerance  Dec  2 2013 11:57AM   

 

 Hyperlipidemia  Dec  2 2013 11:57AM   

 

 Hyperglycemia  Dec  2 2013 11:57AM   

 

 Routine gynecological examination  Dec 17 2013  8:06AM   

 

 Screening Examination for Breast Cancer  Dec 17 2013  8:06AM   

 

 Hypertension  2014 11:58AM   

 

 Glucose Intolerance  2014 11:58AM   

 

 Hyperlipidemia  2014 11:58AM   

 

 Hyperglycemia  2014 11:58AM   

 

 Essential Hypertension  2014 10:39AM   

 

 Diabetes Mellitus, Type II  2014 10:39AM   

 

 Hyperlipidemia, Mixed  2014 10:39AM   

 

 Sinusitis, Acute  Sep 14 2015  9:52AM   

 

 Bronchitis, Acute  Sep 14 2015  9:52AM   

 

 Arthritis unspecified  Oct 26 2015  8:37AM   

 

 Hypertension  Oct 20 2016  9:16AM   

 

 Diabetes Mellitus, Type II  Oct 20 2016  9:16AM   

 

 Hyperlipidemia  Oct 20 2016  9:16AM   

 

 Routine gynecological examination  2016  8:11AM   

 

 Screening Examination for Breast Cancer  2016  8:11AM   

 

 Visit for screening mammogram  2016  8:11AM   

 

 Hyperlipidemia  2016  8:11AM   

 

 Hypertension  2016  8:11AM   

 

 Controlled diabetes mellitus type II without complication  2016  8:11AM
   

 

 Pain in joint of right shoulder  2017  9:41AM   

 

 Acute bronchitis  Mar 27 2017  9:57AM   

 

 Acute non-recurrent maxillary sinusitis  Mar 27 2017  9:57AM   

 

 Hyperlipidemia  Oct 12 2017  8:08AM   

 

 Morbid Obesity  Oct 12 2017  8:08AM   

 

 Hypertension  Oct 12 2017  8:08AM   

 

 Hypertension  Mar 19 2018 10:08AM   

 

 Hyperlipemia  Mar 19 2018 10:08AM   

 

 Morbid obesity  Mar 19 2018 10:08AM   

 

 Family history of diabetes mellitus  Mar 19 2018 10:08AM   

 

 Hyperlipidemia  2018  8:05AM   

 

 Morbid Obesity  2018  8:05AM   

 

 Hypertension  2018  8:05AM   







Payers







 Insurance Name  Company Name  Plan Name  Plan Number  Policy Number  Policy 
Group Number  Start Date

 

    Medicare [facility]  [facility]icare [facility]     [id number]     2008

 

    Medicare Part B  [employer] [place]     [id number]     2008

 

    Medicare Part A  Medicare Part A     [id number]     2008

 

    Medicare Part A  Medicare - Lab/Xray     [id number]     2008







History of Encounters







 Visit Date  Visit Type  Provider

 

 2018  Office visit  Brain Hauser DO

 

 10/12/2017  Office visit  Brain Hauser DO

 

 3/27/2017  Office visit  Brain Hauser DO

 

 2017  Office visit  Brain Hauser DO

 

 2016  Office visit  Brain Hauser DO

 

 10/26/2015  Office visit  Brain Hauser DO

 

 2015  Office visit  Brain Hauser DO

 

 2014  Office visit  Brain Hauser DO

 

 2013  Office visit  Brain Hauser DO

 

 2013  Office visit  Brain Hauser DO

 

 2013  Office visit  Brain Hauser DO

 

 2013  Office visit  Margie Zurita APRN

 

 2012  Office visit  Brain Hauser DO

 

 10/8/2012  Office visit  Brain Hauser DO

 

 2012  Office visit  Brain Hauser DO

 

 2011  Office visit  Brain Hauser DO

 

 2010  Office visit  Brain Hauser DO

 

 2009  Office visit  Brain Hauser DO

 

 2009  Office visit  Brain Hauser DO

## 2019-02-11 NOTE — XMS REPORT
MU2 Ambulatory Summary

 Created on: 10/12/2017



Elena Deluca

External Reference #: [id number]

: 1955

Sex: Female



Demographics







 Address  [address] [place]  [address]

 

 Home Phone  [phone]

 

 Preferred Language  English

 

 Marital Status  

 

 Roman [employer] Affiliation  Unknown

 

 Race  White

 

 Ethnic Group  Not  or 





Author







 Author  Brain Hauser

 

 [facility]

 

 Address  [address]

[place], [place]  [id number]



 

 Phone  [phone]







Care Team Providers







 Care Team Member Name  Role  Phone

 

 Brain Hauser  PCP  Unavailable

 

 Brain Hauser  PreferredProvider  Unavailable







Allergies and Adverse Reactions







 Name  Reaction  Notes

 

 NO KNOWN DRUG ALLERGIES      







Plan of Treatment







 Planned Activity  Comments  Planned Date  Planned Time  Plan/Goal

 

 Discuss Bariatric Surgery options. She needs to lose 100 lbs in order to have 
Left Total Knee Replacement.            







Medications







 Active 

 

 Name  Start Date  Estimated Completion Date  SIG  Comments

 

 Calcium 600mg        one tablet daily   

 

 Multivitamin Oral Tablet        take 1 tablet by oral route daily   

 

 aspirin 81 mg oral tablet        take 1 tablet (81 mg) by oral route once 
daily   

 

 Fish Oil 1,000 mg oral capsule  2010     take 2 capsules by oral route 
daily   

 

 valsartan-hydrochlorothiazide 160-12.5 mg oral tablet  2014     TAKE ONE 
TABLET BY MOUTH EVERY DAY   

 

 simvastatin 20 mg oral tablet  2014     TAKE ONE TABLET BY MOUTH EVERY 
DAY IN THE EVENING   

 

 Osteo Bi-Flex 250-200 mg oral tablet        take 1 tablet by oral route daily 
  

 

 simvastatin 20 mg oral tablet  2014     TAKE ONE TABLET BY MOUTH EVERY 
DAY IN THE EVENING   

 

 valsartan-hydrochlorothiazide 160-12.5 mg oral tablet  2015     TAKE ONE 
TABLET BY MOUTH EVERY DAY   

 

 simvastatin 20 mg oral tablet  2015     TAKE ONE TABLET BY MOUTH ONCE 
DAILY AT BEDTIME   

 

 valsartan-hydrochlorothiazide 160-12.5 mg oral tablet  2015     TAKE ONE 
TABLET BY MOUTH ONCE DAILY   

 

 valsartan-hydrochlorothiazide 160-12.5 mg oral tablet  8/3/2015     TAKE ONE 
TABLET BY MOUTH ONCE DAILY   

 

 simvastatin 20 mg oral tablet  2015     TAKE ONE TABLET BY MOUTH ONCE 
DAILY AT BEDTIME   

 

 amlodipine 10 mg oral tablet  10/18/2015     TAKE ONE TABLET BY MOUTH ONCE 
DAILY   

 

 valsartan-hydrochlorothiazide 160-12.5 mg oral tablet  2016     TAKE ONE 
TABLET BY MOUTH ONCE DAILY   

 

 simvastatin 20 mg oral tablet  2016     TAKE ONE TABLET BY MOUTH ONCE 
DAILY AT BEDTIME   

 

 valsartan-hydrochlorothiazide 160-12.5 mg oral tablet  2016     TAKE ONE 
TABLET BY MOUTH ONCE DAILY   

 

 simvastatin 20 mg oral tablet  2016     TAKE ONE TABLET BY MOUTH ONCE 
DAILY AT BEDTIME   

 

 amlodipine 10 mg oral tablet  10/9/2016     TAKE ONE TABLET BY MOUTH ONCE 
DAILY   

 

 simvastatin 20 mg oral tablet  2016     TAKE ONE TABLET BY MOUTH ONCE 
DAILY AT BEDTIME   

 

 amlodipine 10 mg oral tablet  2017     TAKE ONE TABLET BY MOUTH ONCE DAILY
   

 

 valsartan-hydrochlorothiazide 160-12.5 mg oral tablet  2017     TAKE ONE 
TABLET BY MOUTH ONCE DAILY   

 

 valsartan-hydrochlorothiazide 160-12.5 mg oral tablet  2017     TAKE ONE 
TABLET BY MOUTH ONCE DAILY   

 

 amlodipine 10 mg oral tablet  10/8/2017  2018  TAKE ONE TABLET BY MOUTH 
ONCE DAILY   

 

 cyclobenzaprine 10 mg oral tablet  10/12/2017     take 1 tablet (10 mg) by 
oral route at bedtime   









  

 

 Name  Start Date  Expiration Date  SIG  Comments

 

 Augmentin 500-125 mg oral tablet  10/8/2012  10/15/2012  take 1 tablet by oral 
route every 12 hours for 7 days   

 

 Diflucan 100 mg oral tablet  10/8/2012  10/11/2012  take 1 tablet (100 mg) by 
oral route once daily   

 

 amoxicillin 500 mg oral capsule  2013  take 1 capsule (500 mg) 
by oral route 3 times per day for 10 days   

 

 simvastatin 20 mg oral tablet  9/10/2013  10/10/2013  TAKE ONE TABLET BY MOUTH 
EVERY DAY IN THE EVENING   

 

 amlodipine 10 mg oral tablet  2014  TAKE ONE TABLET BY MOUTH 
EVERY DAY   

 

 Augmentin 500-125 mg oral tablet  2015  take 1 tablet by oral 
route every 12 hours for 7 days   

 

 clindamycin HCl 300 mg oral capsule  3/27/2017  4/3/2017  take 1 capsule (300 
mg) by oral route 2 times per day for 7 days   









 Discontinued 

 

 Name  Start Date  Discontinued Date  SIG  Comments

 

 niacin 500 mg oral tablet extended release 24 hr     2011  take 1 tablets 
(500 mg) by oral route once daily  Starting on Simvastatin

 

 Vesicare 5 mg oral tablet  2011  take 1 tablet by oral route 
daily for 30 days   

 

 Osteo Bi-Flex (5-Loxin) 1,500-400-100 mg-unit-mg oral tablet     2013  
take 1 tablet by oral route daily   

 

 Diovan -12.5 mg oral tablet  2012  TAKE ONE TABLET BY 
MOUTH EVERY DAY  generic on list

 

 Medrol (Lei) 4 mg oral tablets,dose pack  2013  take as 
directed   

 

 nystatin-triamcinolone 100,000-0.1 unit/g-% topical cream  2013  apply to affected area(s) by topical route 2 times a day   

 

 fexofenadine 180 mg oral tablet  2013  take 1 tablet (180 mg) 
by oral route once daily   

 

 prednisone 20 mg oral tablet  10/26/2015  2016  take 3 tablets (60 mg) by 
oral route once daily for 3 days then 2 tablets (40 mg) daily for 2 days   

 

 cyclobenzaprine 10 mg oral tablet  10/30/2015  2016  take 1 tablet (10 mg
) by oral route 3 times per day   

 

 promethazine-codeine 6.25-10 mg/5 mL oral syrup  3/27/2017  10/12/2017  take 5 
milliliters by oral route every 6 hours as needed, not to exceed 30 mL in 24 
hours   







Problem List







 Description  Status  Onset

 

 Arthritis unspecified  Active   

 

 Hyperlipidemia  Active   

 

 Hypertension  Active   







Vital Signs







 Date  Time  BP-Sys(mm[Hg]  BP-Edelmira(mm[Hg])  HR(bpm)  RR(rpm)  Temp  WT  HT  HC  
BMI  BSA  BMI Percentile  O2 Sat(%)

 

 10/12/2017  8:06:00 AM  144 mmHg  82 mmHg  77 bpm  16 rpm  97.7 F  398 lbs  64 
in     68.32 kg/m2  2.86 m2     97 %

 

 3/27/2017  9:55:00 AM  127 mmHg  72 mmHg  76 bpm  22 rpm  98.1 F  393.5 lbs  
63 in     69.7047 kg/m  2.8167 m     97 %

 

 2017  9:36:00 AM  132 mmHg  72 mmHg  76 bpm  20 rpm  97.2 F  396.25 lbs  
64 in     68.02 kg/m2  2.85 m2     97 %

 

 2016  7:58:00 AM  136 mmHg  70 mmHg  85 bpm  20 rpm  98.1 F  391 lbs  64 
in     67.1143 kg/m  2.8299 m     95 %

 

 10/26/2015  8:35:00 AM  124 mmHg  70 mmHg  80 bpm  18 rpm  97.1 F     64 in   
            

 

 2015  9:50:00 AM  132 mmHg  88 mmHg  90 bpm  22 rpm  98.5 F  387 lbs  64 
in     66.4277 kg/m  2.8154 m     97 %

 

 2014  10:36:00 AM  142 mmHg  80 mmHg  78 bpm  22 rpm  97.9 F  381 lbs  64 
in     65.40 kg/m2  2.79 m2     97 %

 

 2013  8:04:00 AM  138 mmHg  70 mmHg  88 bpm  22 rpm  98.1 F  388 lbs  64 
in     66.5994 kg/m  2.8191 m     98 %

 

 2013  9:54:00 AM  136 mmHg  70 mmHg  88 bpm  24 rpm  97.1 F  386 lbs  64 
in     66.26 kg/m2  2.81 m2     97 %

 

 2013  9:47:00 AM  134 mmHg  78 mmHg  70 bpm  16 rpm  97.8 F  381 lbs  64 
in     65.3978 kg/m  2.7935 m      

 

 2013  10:27:00 AM  136 mmHg  84 mmHg  82 bpm  22 rpm  98.2 F  384.2 lbs  
64 in     65.95 kg/m2  2.81 m2     96 %

 

 2012  7:59:00 AM  138 mmHg  72 mmHg  70 bpm  18 rpm  98 F  377 lbs  64 
in     64.7112 kg/m  2.7788 m      

 

 10/8/2012  8:29:00 AM  138 mmHg  70 mmHg  72 bpm  18 rpm  98.1 F  371 lbs  64 
in     63.68 kg/m2  2.76 m2      

 

 2012  10:57:00 AM  136 mmHg  72 mmHg  68 bpm  18 rpm  98 F  371 lbs  64 
in     63.6814 kg/m  2.7566 m      

 

 2011  8:43:00 AM  144 mmHg  78 mmHg  72 bpm  18 rpm  98.2 F  396 lbs  64 
in     67.97 kg/m2  2.85 m2      

 

 2010  1:51:00 PM  142 mmHg  80 mmHg  76 bpm  16 rpm  98.7 F  382 lbs  64 
in     65.5695 kg/m  2.7972 m      

 

 2009  8:23:00 AM  132 mmHg  78 mmHg  70 bpm  24 rpm  98.3 F  377 lbs  64 
in     64.71 kg/m2  2.78 m2      







Social History







 Name  Description  Comments

 

 Tobacco  Former smoker  for 2 years

 

       







History of Procedures







 Date Ordered  Description  Order Status

 

 10/26/2015 12:00 AM  X-RAY EXAM OF HIP  Reviewed

 

 10/26/2015 12:00 AM  X-RAY EXAM OF KNEE 3  Reviewed

 

 2011 12:00 AM  COMPREHEN METABOLIC PANEL  Reviewed

 

 2011 12:00 AM  LIPID PANEL  Reviewed

 

 2011 12:00 AM  GLYCOSYLATED HEMOGLOBIN TEST  Reviewed

 

 2011 12:00 AM  CYTOPATH C/V THIN LAYER  Reviewed

 

 2011 12:00 AM  MAMMOGRAM SCREENING  Reviewed

 

 2012 12:00 AM  ASSAY GLUCOSE BLOOD QUANT  Reviewed

 

 10/20/2016 12:00 AM  COMPREHEN METABOLIC PANEL  Reviewed

 

 10/20/2016 12:00 AM  LIPID PANEL  Reviewed

 

 10/20/2016 12:00 AM  GLYCOSYLATED HEMOGLOBIN TEST  Reviewed

 

 10/20/2016 12:00 AM  MICROALBUMIN QUANTITATIVE  Reviewed

 

 10/20/2016 12:00 AM  VITAMIN B-12  Reviewed

 

 2017 12:00 AM  RADEX SHOULDER COMPLETE MINIMUM 2 VIEWS  Reviewed

 

 2017 12:00 AM  RADEX HUMERUS MINIMUM 2 VIEWS  Reviewed

 

 2017 12:00 AM  Decadron 8mg Injection  Reviewed

 

 2017 12:00 AM  Depo-Medrol 80mg Injection  Reviewed

 

 2012 12:00 AM  TDAP VACCINE 7 YRS/> IM  Reviewed

 

 2009 12:00 AM  COMPREHEN METABOLIC PANEL  Reviewed

 

 2009 12:00 AM  LIPID PANEL  Reviewed

 

 2009 12:00 AM  GLYCOSYLATED HEMOGLOBIN TEST  Reviewed

 

 2009 12:00 AM  MAMMOGRAM SCREENING  Reviewed

 

 12/10/2012 12:00 AM  COMPREHEN METABOLIC PANEL  Reviewed

 

 12/10/2012 12:00 AM  LIPID PANEL  Reviewed

 

 12/10/2012 12:00 AM  GLYCOSYLATED HEMOGLOBIN TEST  Reviewed

 

 2012 12:00 AM  MAMMOGRAM SCREENING  Reviewed

 

 2012 12:00 AM  ASSAY GLUCOSE BLOOD QUANT  Reviewed

 

 2013 12:00 AM  COMPREHEN METABOLIC PANEL  Reviewed

 

 2013 12:00 AM  LIPID PANEL  Reviewed

 

 2013 12:00 AM  GLYCOSYLATED HEMOGLOBIN TEST  Reviewed

 

 2013 12:00 AM  CYTOPATH C/V THIN LAYER  Reviewed

 

 2013 12:00 AM  MAMMOGRAM SCREENING  Reviewed

 

 2009 12:00 AM  CYTOPATH C/V THIN LAYER  Reviewed

 

 2010 12:00 AM  COMPREHEN METABOLIC PANEL  Reviewed

 

 2010 12:00 AM  LIPID PANEL  Reviewed

 

 2010 12:00 AM  GLYCOSYLATED HEMOGLOBIN TEST  Reviewed

 

 2010 12:00 AM  CYTOPATH C/V THIN LAYER  Reviewed

 

 2010 12:00 AM  MAMMOGRAM SCREENING  Reviewed

 

 2014 12:00 AM  COMPREHEN METABOLIC PANEL  Reviewed

 

 2014 12:00 AM  LIPID PANEL  Reviewed

 

 2014 12:00 AM  GLYCOSYLATED HEMOGLOBIN TEST  Reviewed







Results Summary







 Date and Description  Results

 

 2011 9:00 AM  GLUCOSE 130.0 mg/dLSODIUM 139.0 mmol/LPOTASSIUM 4.40 mmol/
LCHLORIDE 104.0 mmol/LCO2 26.0 mmol/LBUN 21.0 mg/dLCREATININE 0.80 mg/dLSGOT/
AST 19.0 IU/LSGPT/ALT 25.0 IU/LALK PHOS 108.0 IU/LTOTAL PROTEIN 7.10 g/
dLALBUMIN 4.20 g/dLTOTAL BILI 0.40 mg/dLCALCIUM 10.20 mg/dLAGE 56 GFR NonAA 74 
GFR AA 90 eGFR >60 mL/min/1.73 m2eGFR AA* >60 TRIGLYCERIDES 227.0 mg/
dLCHOLESTEROL 282.0 mg/dLHDL 45.0 mg/dLTOT CHOL/HDL 6.3 LDL (CALC) 192.0 mg/
dLGLYCOHEMOGLOBIN A1C 6.10 %

 

 2012 9:42 AM  GLUCOSE 131.0 mg/dL

 

 2012 10:02 AM  GLUCOSE 150.0 mg/dLSODIUM 140.0 mmol/LPOTASSIUM 4.60 mmol/
LCHLORIDE 104.0 mmol/LCO2 25.0 mmol/LBUN 21.0 mg/dLCREATININE 0.90 mg/dLSGOT/
AST 19.0 IU/LSGPT/ALT 26.0 IU/LALK PHOS 99.0 IU/LTOTAL PROTEIN 7.10 g/dLALBUMIN 
4.20 g/dLTOTAL BILI 0.50 mg/dLCALCIUM 10.80 mg/dLAGE 57 GFR NonAA 65 GFR AA 79 
eGFR 60 eGFR AA* 60 TRIGLYCERIDES 210.0 mg/dLCHOLESTEROL 241.0 mg/dLHDL 48.0 mg/
dLTOT CHOL/HDL 5.0 LDL (CALC) 151.0 mg/dLGLYCOHEMOGLOBIN A1C 6.30 %

 

 2012 8:54 AM  GLUCOSE 125.0 mg/dL

 

 12/3/2013 8:58 AM  TRIGLYCERIDES 210.0 mg/dLCHOLESTEROL 211.0 mg/dLHDL 49.0 mg/
dLTOT CHOL/HDL 4.3 LDL (CALC) 120.0 mg/dLGLUCOSE 125.0 mg/dLSODIUM 140.0 mmol/
LPOTASSIUM 4.20 mmol/LCHLORIDE 105.0 mmol/LCO2 25.0 mmol/LBUN 24.0 mg/
dLCREATININE 0.90 mg/dLSGOT/AST 23.0 IU/LSGPT/ALT 28.0 IU/LALK PHOS 111.0 IU/
LTOTAL PROTEIN 7.0 g/dLALBUMIN 4.30 g/dLTOTAL BILI 0.60 mg/dLCALCIUM 10.60 mg/
dLAGE 58 GFR NonAA 64 GFR AA 78 eGFR >60 mL/min/1.73 m2eGFR AA* >60 HGB A1C 
6.90 %Est Avg Glucose 151.3 mg/dL

 

 2014 9:05 AM  TRIGLYCERIDES 210.0 mg/dLCHOLESTEROL 204.0 mg/dLHDL 51.0 mg/
dLTOT CHOL/HDL 4.0 LDL (CALC) 111.0 mg/dLGLUCOSE 120.0 mg/dLSODIUM 141.0 mmol/
LPOTASSIUM 4.40 mmol/LCHLORIDE 103.0 mmol/LCO2 25.0 mmol/LBUN 18.0 mg/
dLCREATININE 0.90 mg/dLSGOT/AST 24.0 IU/LSGPT/ALT 29.0 IU/LALK PHOS 98.0 IU/
LTOTAL PROTEIN 7.0 g/dLALBUMIN 4.10 g/dLTOTAL BILI 0.60 mg/dLCALCIUM 10.20 mg/
dLAGE 59 GFR NonAA 64 GFR AA 78 eGFR 60 eGFR AA* 60 HGB A1C 6.80 %Est Avg 
Glucose 148.5 mg/dL

 

 10/24/2016 10:22 AM  HGB A1C 6.50 %Est Avg Glucose 139.9 mg/dLGLUCOSE 129.0 mg/
dLSODIUM 143.0 mmol/LPOTASSIUM 4.40 mmol/LCHLORIDE 104.0 mmol/LCO2 24.0 mmol/
LBUN 25.0 mg/dLCREATININE 0.90 mg/dLSGOT/AST 25.0 IU/LSGPT/ALT 35.0 IU/LALK 
PHOS 96.0 IU/LTOTAL PROTEIN 7.10 g/dLALBUMIN 4.20 g/dLTOTAL BILI 0.50 mg/
dLCALCIUM 10.30 mg/dLAGE 61 GFR NonAA 64 GFR AA 78 eGFR >60 mL/min/1.73meGFR 
AA* >60 TRIGLYCERIDES 181.0 mg/dLCHOLESTEROL 201.0 mg/dLHDL 47.0 mg/dLTOT CHOL/
HDL 4.3 LDL (CALC) 118.0 mg/dLMICROALBUMIN UR <0.5 ug/mLVITAMIN B12 485.0 pg/mL







History Of Immunizations







 Name  Date Admin  Mfg Name  Mfg Code  Trade Name  Lot#  Route  Inj  Vis Given  
Vis Pub  CVX

 

 Tdap  2012  [employer]  SKB  ADACEL  [id number]  Intramuscular  Right 
Deltoid  2008  115

 

 Influenza  2015  Not Entered  NE  Afluria     Not Entered  Not Entered  1  141

 

 Influenza  2016  Not Entered  NE  Afluria     Not Entered  Not Entered  1  141







History of Past Illness







 Name  Date of Onset  Comments

 

 Hypertension  Dec  7 2009  1:45PM   

 

 Glucose Intolerance  Dec  7 2009  1:45PM   

 

 Hyperlipidemia  Dec  7 2009  1:45PM   

 

 Screening Mammogram  Dec  7 2009  1:45PM   

 

 Routine gynecological examination  Dec 21 2009  8:25AM   

 

 Hypertension Stable  Dec 21 2009  8:25AM   

 

 Hyperlipidemia, unspecified Stable  Dec 21 2009  8:25AM   

 

 Arthritis unspecified      

 

 Hyperlipidemia      

 

 Hypertension      

 

 Hypertension  Dec 17 2010  1:41PM   

 

 Glucose Intolerance  Dec 17 2010  1:41PM   

 

 Hyperlipidemia  Dec 17 2010  1:41PM   

 

 Hyperglycemia  Dec 17 2010  1:41PM   

 

 Routine gynecological examination  Dec 20 2010  1:52PM   

 

 Overactive bladder  Dec 20 2010  1:52PM   

 

 Hypertension  2011 12:05PM   

 

 Glucose Intolerance  2011 12:05PM   

 

 Hyperlipidemia  2011 12:05PM   

 

 Hyperglycemia  2011 12:05PM   

 

 Routine gynecological examination  Dec  8 2011  8:45AM   

 

 Overactive bladder  Dec  8 2011  8:45AM   

 

 Screening Examination for Breast Cancer  Dec  8 2011  8:45AM   

 

 Hyperglycemia  Dec  8 2011  8:45AM   

 

 Laceration 2nd digit right hand  May 21 2012 10:59AM   

 

 Acute Pharyngitis  Oct  8 2012  8:30AM   

 

 Hypertension  Dec 10 2012 11:59AM   

 

 Glucose Intolerance  Dec 10 2012 11:59AM   

 

 Hyperlipidemia  Dec 10 2012 11:59AM   

 

 Hyperglycemia  Dec 10 2012 11:59AM   

 

 Screening Examination for Breast Cancer  Dec 19 2012  8:02AM   

 

 Hypertension  Dec 19 2012  8:02AM   

 

 Hyperlipidemia, Mixed  Dec 19 2012  8:02AM   

 

 Hyperglycemia  Dec 19 2012  8:02AM   

 

 Upper Respiratory Infections  May 23 2013 10:32AM   

 

 Eczema  Aug 14 2013  9:49AM   

 

 Eustachian Tube Dysfunction, Bilateral  Aug 14 2013  9:49AM   

 

 Worried well  Sep 16 2013  9:56AM   

 

 Hypertension  Dec  2 2013 11:57AM   

 

 Glucose Intolerance  Dec  2 2013 11:57AM   

 

 Hyperlipidemia  Dec  2 2013 11:57AM   

 

 Hyperglycemia  Dec  2 2013 11:57AM   

 

 Routine gynecological examination  Dec 17 2013  8:06AM   

 

 Screening Examination for Breast Cancer  Dec 17 2013  8:06AM   

 

 Hypertension  2014 11:58AM   

 

 Glucose Intolerance  2014 11:58AM   

 

 Hyperlipidemia  2014 11:58AM   

 

 Hyperglycemia  2014 11:58AM   

 

 Essential Hypertension  2014 10:39AM   

 

 Diabetes Mellitus, Type II  2014 10:39AM   

 

 Hyperlipidemia, Mixed  2014 10:39AM   

 

 Sinusitis, Acute  Sep 14 2015  9:52AM   

 

 Bronchitis, Acute  Sep 14 2015  9:52AM   

 

 Arthritis unspecified  Oct 26 2015  8:37AM   

 

 Hypertension  Oct 20 2016  9:16AM   

 

 Diabetes Mellitus, Type II  Oct 20 2016  9:16AM   

 

 Hyperlipidemia  Oct 20 2016  9:16AM   

 

 Routine gynecological examination  2016  8:11AM   

 

 Screening Examination for Breast Cancer  2016  8:11AM   

 

 Visit for screening mammogram  2016  8:11AM   

 

 Hyperlipidemia  2016  8:11AM   

 

 Hypertension  2016  8:11AM   

 

 Controlled diabetes mellitus type II without complication  2016  8:11AM
   

 

 Pain in joint of right shoulder  2017  9:41AM   

 

 Acute bronchitis  Mar 27 2017  9:57AM   

 

 Acute non-recurrent maxillary sinusitis  Mar 27 2017  9:57AM   

 

 Hyperlipidemia  Oct 12 2017  8:08AM   

 

 Morbid Obesity  Oct 12 2017  8:08AM   

 

 Hypertension  Oct 12 2017  8:08AM   







Payers







 Insurance Name  Company Name  Plan Name  Plan Number  Policy Number  Policy 
Group Number  Start Date

 

    Medicare RHC Medicare RHC     [id number]     2008

 

    Medicare Part B  [employer] [place]     [id number]     2008

 

    Medicare Part A  Medicare Part A     [id number]     2008

 

    Medicare Part A  Medicare - Lab/Xray     [id number]     2008







History of Encounters







 Visit Date  Visit Type  Provider

 

 10/12/2017  Office visit  Brain Hauser DO

 

 3/27/2017  Office visit  Brain Hauser DO

 

 2017  Office visit  Brain Hauser DO

 

 2016  Office visit  Brain Shafferte DO

 

 10/26/2015  Office visit  Brain Shafferte DO

 

 2015  Office visit  Brain Shafferte DO

 

 2014  Office visit  Brain Hauser DO

 

 2013  Office visit  Brain Shafferte DO

 

 2013  Office visit  Brain Shafferte DO

 

 2013  Office visit  Brain Shafferte DO

 

 2013  Office visit  Margie RODAS

 

 2012  Office visit  Brain Hauser DO

 

 10/8/2012  Office visit  Brain Shafferte DO

 

 2012  Office visit  Brain Hauser DO

 

 2011  Office visit  Brain Hauser DO

 

 2010  Office visit  Brain Hauser DO

 

 2009  Office visit  Brain Mrutaza DO

 

 2009  Office visit  Brain Shafferte DO

## 2019-02-11 NOTE — XMS REPORT
MU2 Ambulatory Summary

 Created on: 04/15/2018



Elena Deluca

External Reference #: [id number]

: 1955

Sex: Female



Demographics







 Address  [address] [place]  [address]

 

 Home Phone  [phone]

 

 Preferred Language  English

 

 Marital Status  

 

 Anglican Affiliation  Unknown

 

 Race  White

 

 Ethnic Group  Not  or 





Author







 Author  Brain Hauser

 

 [facility]

 

 Address  [address]

[place], [place]  [id number]



 

 Phone  [phone]







Care Team Providers







 Care Team Member Name  Role  Phone

 

 Brain Hauser  PCP  [phone]

 

 Brain Hauser  PreferredProvider  [phone]







Allergies and Adverse Reactions







 Name  Reaction  Notes

 

 NO KNOWN DRUG ALLERGIES      







Plan of Treatment







 Planned Activity  Comments  Planned Date  Planned Time  Plan/Goal

 

 Discuss Bariatric Surgery options. She needs to lose 100 lbs in order to have 
Left Total Knee Replacement.            







Medications







 Active 

 

 Name  Start Date  Estimated Completion Date  SIG  Comments

 

 Multivitamin Oral Tablet        take 1 tablet by oral route daily   

 

 aspirin 81 mg oral tablet        take 1 tablet (81 mg) by oral route once 
daily   

 

 Fish Oil 1,000 mg oral capsule  2010     take 2 capsules by oral route 
daily   

 

 valsartan-hydrochlorothiazide 160-12.5 mg oral tablet  2014     TAKE ONE 
TABLET BY MOUTH EVERY DAY   

 

 simvastatin 20 mg oral tablet  2014     TAKE ONE TABLET BY MOUTH EVERY 
DAY IN THE EVENING   

 

 simvastatin 20 mg oral tablet  2014     TAKE ONE TABLET BY MOUTH EVERY 
DAY IN THE EVENING   

 

 valsartan-hydrochlorothiazide 160-12.5 mg oral tablet  2015     TAKE ONE 
TABLET BY MOUTH EVERY DAY   

 

 simvastatin 20 mg oral tablet  2015     TAKE ONE TABLET BY MOUTH ONCE 
DAILY AT BEDTIME   

 

 valsartan-hydrochlorothiazide 160-12.5 mg oral tablet  2015     TAKE ONE 
TABLET BY MOUTH ONCE DAILY   

 

 valsartan-hydrochlorothiazide 160-12.5 mg oral tablet  8/3/2015     TAKE ONE 
TABLET BY MOUTH ONCE DAILY   

 

 simvastatin 20 mg oral tablet  2015     TAKE ONE TABLET BY MOUTH ONCE 
DAILY AT BEDTIME   

 

 amlodipine 10 mg oral tablet  10/18/2015     TAKE ONE TABLET BY MOUTH ONCE 
DAILY   

 

 valsartan-hydrochlorothiazide 160-12.5 mg oral tablet  2016     TAKE ONE 
TABLET BY MOUTH ONCE DAILY   

 

 simvastatin 20 mg oral tablet  2016     TAKE ONE TABLET BY MOUTH ONCE 
DAILY AT BEDTIME   

 

 valsartan-hydrochlorothiazide 160-12.5 mg oral tablet  2016     TAKE ONE 
TABLET BY MOUTH ONCE DAILY   

 

 simvastatin 20 mg oral tablet  2016     TAKE ONE TABLET BY MOUTH ONCE 
DAILY AT BEDTIME   

 

 amlodipine 10 mg oral tablet  10/9/2016     TAKE ONE TABLET BY MOUTH ONCE 
DAILY   

 

 amlodipine 10 mg oral tablet  2017     TAKE ONE TABLET BY MOUTH ONCE DAILY
   

 

 valsartan-hydrochlorothiazide 160-12.5 mg oral tablet  2017     TAKE ONE 
TABLET BY MOUTH ONCE DAILY   

 

 magnesium 250 mg oral tablet        take 1 tablet by oral route daily   

 

 potassium 99 mg oral tablet        take 1 tablet by oral route daily   

 

 ibuprofen 800 mg oral tablet  2018     take 1 tablet (800 mg) by oral 
route 3 times per day with food as needed   









  

 

 Name  Start Date  Expiration Date  SIG  Comments

 

 Augmentin 500-125 mg oral tablet  10/8/2012  10/15/2012  take 1 tablet by oral 
route every 12 hours for 7 days   

 

 Diflucan 100 mg oral tablet  10/8/2012  10/11/2012  take 1 tablet (100 mg) by 
oral route once daily   

 

 amoxicillin 500 mg oral capsule  2013  take 1 capsule (500 mg) 
by oral route 3 times per day for 10 days   

 

 simvastatin 20 mg oral tablet  9/10/2013  10/10/2013  TAKE ONE TABLET BY MOUTH 
EVERY DAY IN THE EVENING   

 

 amlodipine 10 mg oral tablet  2014  TAKE ONE TABLET BY MOUTH 
EVERY DAY   

 

 Augmentin 500-125 mg oral tablet  2015  take 1 tablet by oral 
route every 12 hours for 7 days   

 

 clindamycin HCl 300 mg oral capsule  3/27/2017  4/3/2017  take 1 capsule (300 
mg) by oral route 2 times per day for 7 days   

 

 simvastatin 20 mg oral tablet  2017  TAKE ONE TABLET BY 
MOUTH ONCE DAILY AT BEDTIME   

 

 valsartan-hydrochlorothiazide 160-12.5 mg oral tablet  2018  
TAKE ONE TABLET BY MOUTH ONCE DAILY   

 

 amlodipine 10 mg oral tablet  2018  TAKE ONE TABLET BY MOUTH 
ONCE DAILY   









 Discontinued 

 

 Name  Start Date  Discontinued Date  SIG  Comments

 

 niacin 500 mg oral tablet extended release 24 hr     2011  take 1 tablets 
(500 mg) by oral route once daily  Starting on Simvastatin

 

 Calcium 600mg     2018  one tablet daily   

 

 Vesicare 5 mg oral tablet  2011  take 1 tablet by oral route 
daily for 30 days   

 

 Osteo Bi-Flex (5-Loxin) 1,500-400-100 mg-unit-mg oral tablet     2013  
take 1 tablet by oral route daily   

 

 Diovan -12.5 mg oral tablet  2012  TAKE ONE TABLET BY 
MOUTH EVERY DAY  generic on list

 

 Medrol (Lei) 4 mg oral tablets,dose pack  2013  take as 
directed   

 

 nystatin-triamcinolone 100,000-0.1 unit/g-% topical cream  2013  apply to affected area(s) by topical route 2 times a day   

 

 fexofenadine 180 mg oral tablet  2013  take 1 tablet (180 mg) 
by oral route once daily   

 

 Osteo Bi-Flex 250-200 mg oral tablet     2018  take 1 tablet by oral route 
daily   

 

 prednisone 20 mg oral tablet  10/26/2015  2016  take 3 tablets (60 mg) by 
oral route once daily for 3 days then 2 tablets (40 mg) daily for 2 days   

 

 cyclobenzaprine 10 mg oral tablet  10/30/2015  2016  take 1 tablet (10 mg
) by oral route 3 times per day   

 

 promethazine-codeine 6.25-10 mg/5 mL oral syrup  3/27/2017  10/12/2017  take 5 
milliliters by oral route every 6 hours as needed, not to exceed 30 mL in 24 
hours   

 

 cyclobenzaprine 10 mg oral tablet  10/12/2017  2018  take 1 tablet (10 mg) 
by oral route at bedtime   







Problem List







 Description  Status  Onset

 

 Arthritis unspecified  Active   

 

 Hyperlipidemia  Active   

 

 Hypertension  Active   







Vital Signs







 Date  Time  BP-Sys(mm[Hg]  BP-Edelmira(mm[Hg])  HR(bpm)  RR(rpm)  Temp  WT  HT  HC  
BMI  BSA  BMI Percentile  O2 Sat(%)

 

 2018  8:02:00 AM  122 mmHg  60 mmHg  74 bpm  22 rpm  98.2 F  366 lbs  64 
in     62.8231 kg/m  2.738 m     97 %

 

 10/12/2017  8:06:00 AM  144 mmHg  82 mmHg  77 bpm  16 rpm  97.7 F  398 lbs  64 
in     68.32 kg/m2  2.86 m2     97 %

 

 3/27/2017  9:55:00 AM  127 mmHg  72 mmHg  76 bpm  22 rpm  98.1 F  393.5 lbs  
63 in     69.7047 kg/m  2.8167 m     97 %

 

 2017  9:36:00 AM  132 mmHg  72 mmHg  76 bpm  20 rpm  97.2 F  396.25 lbs  
64 in     68.02 kg/m2  2.85 m2     97 %

 

 2016  7:58:00 AM  136 mmHg  70 mmHg  85 bpm  20 rpm  98.1 F  391 lbs  64 
in     67.1143 kg/m  2.8299 m     95 %

 

 10/26/2015  8:35:00 AM  124 mmHg  70 mmHg  80 bpm  18 rpm  97.1 F     64 in   
            

 

 2015  9:50:00 AM  132 mmHg  88 mmHg  90 bpm  22 rpm  98.5 F  387 lbs  64 
in     66.43 kg/m2  2.82 m2     97 %

 

 2014  10:36:00 AM  142 mmHg  80 mmHg  78 bpm  22 rpm  97.9 F  381 lbs  64 
in     65.3978 kg/m  2.7935 m     97 %

 

 2013  8:04:00 AM  138 mmHg  70 mmHg  88 bpm  22 rpm  98.1 F  388 lbs  64 
in     66.60 kg/m2  2.82 m2     98 %

 

 2013  9:54:00 AM  136 mmHg  70 mmHg  88 bpm  24 rpm  97.1 F  386 lbs  64 
in     66.2561 kg/m  2.8118 m     97 %

 

 2013  9:47:00 AM  134 mmHg  78 mmHg  70 bpm  16 rpm  97.8 F  381 lbs  64 
in     65.40 kg/m2  2.79 m2      

 

 2013  10:27:00 AM  136 mmHg  84 mmHg  82 bpm  22 rpm  98.2 F  384.2 lbs  
64 in     65.9471 kg/m  2.8052 m     96 %

 

 2012  7:59:00 AM  138 mmHg  72 mmHg  70 bpm  18 rpm  98 F  377 lbs  64 
in     64.71 kg/m2  2.78 m2      

 

 10/8/2012  8:29:00 AM  138 mmHg  70 mmHg  72 bpm  18 rpm  98.1 F  371 lbs  64 
in     63.6814 kg/m  2.7566 m      

 

 2012  10:57:00 AM  136 mmHg  72 mmHg  68 bpm  18 rpm  98 F  371 lbs  64 
in     63.68 kg/m2  2.76 m2      

 

 2011  8:43:00 AM  144 mmHg  78 mmHg  72 bpm  18 rpm  98.2 F  396 lbs  64 
in     67.9725 kg/m  2.848 m      

 

 2010  1:51:00 PM  142 mmHg  80 mmHg  76 bpm  16 rpm  98.7 F  382 lbs  64 
in     65.57 kg/m2  2.80 m2      

 

 2009  8:23:00 AM  132 mmHg  78 mmHg  70 bpm  24 rpm  98.3 F  377 lbs  64 
in     64.7112 kg/m  2.7788 m      







Social History







 Name  Description  Comments

 

 Tobacco  Former smoker  for 2 years

 

       







History of Procedures







 Date Ordered  Description  Order Status

 

 10/26/2015 12:00 AM  X-RAY EXAM OF HIP  Reviewed

 

 10/26/2015 12:00 AM  X-RAY EXAM OF KNEE 3  Reviewed

 

 2011 12:00 AM  COMPREHEN METABOLIC PANEL  Reviewed

 

 2011 12:00 AM  LIPID PANEL  Reviewed

 

 2011 12:00 AM  GLYCOSYLATED HEMOGLOBIN TEST  Reviewed

 

 2011 12:00 AM  CYTOPATH C/V THIN LAYER  Reviewed

 

 2011 12:00 AM  MAMMOGRAM SCREENING  Reviewed

 

 2012 12:00 AM  ASSAY GLUCOSE BLOOD QUANT  Reviewed

 

 10/20/2016 12:00 AM  COMPREHEN METABOLIC PANEL  Reviewed

 

 10/20/2016 12:00 AM  LIPID PANEL  Reviewed

 

 10/20/2016 12:00 AM  GLYCOSYLATED HEMOGLOBIN TEST  Reviewed

 

 10/20/2016 12:00 AM  MICROALBUMIN QUANTITATIVE  Reviewed

 

 10/20/2016 12:00 AM  VITAMIN B-12  Reviewed

 

 2017 12:00 AM  RADEX SHOULDER COMPLETE MINIMUM 2 VIEWS  Reviewed

 

 2017 12:00 AM  RADEX HUMERUS MINIMUM 2 VIEWS  Reviewed

 

 2017 12:00 AM  Decadron 8mg Injection  Reviewed

 

 2017 12:00 AM  Depo-Medrol 80mg Injection  Reviewed

 

 2012 12:00 AM  TDAP VACCINE 7 YRS/> IM  Reviewed

 

 2009 12:00 AM  COMPREHEN METABOLIC PANEL  Reviewed

 

 2009 12:00 AM  LIPID PANEL  Reviewed

 

 2009 12:00 AM  GLYCOSYLATED HEMOGLOBIN TEST  Reviewed

 

 2009 12:00 AM  MAMMOGRAM SCREENING  Reviewed

 

 12/10/2012 12:00 AM  COMPREHEN METABOLIC PANEL  Reviewed

 

 12/10/2012 12:00 AM  LIPID PANEL  Reviewed

 

 12/10/2012 12:00 AM  GLYCOSYLATED HEMOGLOBIN TEST  Reviewed

 

 2012 12:00 AM  MAMMOGRAM SCREENING  Reviewed

 

 2012 12:00 AM  ASSAY GLUCOSE BLOOD QUANT  Reviewed

 

 3/19/2018 12:00 AM  COMPLETE CBC W/AUTO DIFF WBC  Reviewed

 

 3/19/2018 12:00 AM  COMPREHEN METABOLIC PANEL  Reviewed

 

 3/19/2018 12:00 AM  LIPID PANEL  Reviewed

 

 3/19/2018 12:00 AM  GLYCOSYLATED HEMOGLOBIN TEST  Reviewed

 

 2013 12:00 AM  COMPREHEN METABOLIC PANEL  Reviewed

 

 2013 12:00 AM  LIPID PANEL  Reviewed

 

 2013 12:00 AM  GLYCOSYLATED HEMOGLOBIN TEST  Reviewed

 

 2013 12:00 AM  CYTOPATH C/V THIN LAYER  Reviewed

 

 2013 12:00 AM  MAMMOGRAM SCREENING  Reviewed

 

 2009 12:00 AM  CYTOPATH C/V THIN LAYER  Reviewed

 

 2010 12:00 AM  COMPREHEN METABOLIC PANEL  Reviewed

 

 2010 12:00 AM  LIPID PANEL  Reviewed

 

 2010 12:00 AM  GLYCOSYLATED HEMOGLOBIN TEST  Reviewed

 

 2010 12:00 AM  CYTOPATH C/V THIN LAYER  Reviewed

 

 2010 12:00 AM  MAMMOGRAM SCREENING  Reviewed

 

 2014 12:00 AM  COMPREHEN METABOLIC PANEL  Reviewed

 

 2014 12:00 AM  LIPID PANEL  Reviewed

 

 2014 12:00 AM  GLYCOSYLATED HEMOGLOBIN TEST  Reviewed







Results Summary







 Date and Description  Results

 

 2011 9:00 AM  GLUCOSE 130.0 mg/dLSODIUM 139.0 mmol/LPOTASSIUM 4.40 mmol/
LCHLORIDE 104.0 mmol/LCO2 26.0 mmol/LBUN 21.0 mg/dLCREATININE 0.80 mg/dLSGOT/
AST 19.0 IU/LSGPT/ALT 25.0 IU/LALK PHOS 108.0 IU/LTOTAL PROTEIN 7.10 g/
dLALBUMIN 4.20 g/dLTOTAL BILI 0.40 mg/dLCALCIUM 10.20 mg/dLAGE 56 GFR NonAA 74 
GFR AA 90 eGFR >60 mL/min/1.73 m2eGFR AA* >60 TRIGLYCERIDES 227.0 mg/
dLCHOLESTEROL 282.0 mg/dLHDL 45.0 mg/dLTOT CHOL/HDL 6.3 LDL (CALC) 192.0 mg/
dLGLYCOHEMOGLOBIN A1C 6.10 %

 

 2012 9:42 AM  GLUCOSE 131.0 mg/dL

 

 2012 10:02 AM  GLUCOSE 150.0 mg/dLSODIUM 140.0 mmol/LPOTASSIUM 4.60 mmol/
LCHLORIDE 104.0 mmol/LCO2 25.0 mmol/LBUN 21.0 mg/dLCREATININE 0.90 mg/dLSGOT/
AST 19.0 IU/LSGPT/ALT 26.0 IU/LALK PHOS 99.0 IU/LTOTAL PROTEIN 7.10 g/dLALBUMIN 
4.20 g/dLTOTAL BILI 0.50 mg/dLCALCIUM 10.80 mg/dLAGE 57 GFR NonAA 65 GFR AA 79 
eGFR 60 eGFR AA* 60 TRIGLYCERIDES 210.0 mg/dLCHOLESTEROL 241.0 mg/dLHDL 48.0 mg/
dLTOT CHOL/HDL 5.0 LDL (CALC) 151.0 mg/dLGLYCOHEMOGLOBIN A1C 6.30 %

 

 2012 8:54 AM  GLUCOSE 125.0 mg/dL

 

 12/3/2013 8:58 AM  TRIGLYCERIDES 210.0 mg/dLCHOLESTEROL 211.0 mg/dLHDL 49.0 mg/
dLTOT CHOL/HDL 4.3 LDL (CALC) 120.0 mg/dLGLUCOSE 125.0 mg/dLSODIUM 140.0 mmol/
LPOTASSIUM 4.20 mmol/LCHLORIDE 105.0 mmol/LCO2 25.0 mmol/LBUN 24.0 mg/
dLCREATININE 0.90 mg/dLSGOT/AST 23.0 IU/LSGPT/ALT 28.0 IU/LALK PHOS 111.0 IU/
LTOTAL PROTEIN 7.0 g/dLALBUMIN 4.30 g/dLTOTAL BILI 0.60 mg/dLCALCIUM 10.60 mg/
dLAGE 58 GFR NonAA 64 GFR AA 78 eGFR >60 mL/min/1.73 m2eGFR AA* >60 HGB A1C 
6.90 %Est Avg Glucose 151.3 mg/dL

 

 2014 9:05 AM  TRIGLYCERIDES 210.0 mg/dLCHOLESTEROL 204.0 mg/dLHDL 51.0 mg/
dLTOT CHOL/HDL 4.0 LDL (CALC) 111.0 mg/dLGLUCOSE 120.0 mg/dLSODIUM 141.0 mmol/
LPOTASSIUM 4.40 mmol/LCHLORIDE 103.0 mmol/LCO2 25.0 mmol/LBUN 18.0 mg/
dLCREATININE 0.90 mg/dLSGOT/AST 24.0 IU/LSGPT/ALT 29.0 IU/LALK PHOS 98.0 IU/
LTOTAL PROTEIN 7.0 g/dLALBUMIN 4.10 g/dLTOTAL BILI 0.60 mg/dLCALCIUM 10.20 mg/
dLAGE 59 GFR NonAA 64 GFR AA 78 eGFR 60 eGFR AA* 60 HGB A1C 6.80 %Est Avg 
Glucose 148.5 mg/dL

 

 10/24/2016 10:22 AM  HGB A1C 6.50 %Est Avg Glucose 139.9 mg/dLGLUCOSE 129.0 mg/
dLSODIUM 143.0 mmol/LPOTASSIUM 4.40 mmol/LCHLORIDE 104.0 mmol/LCO2 24.0 mmol/
LBUN 25.0 mg/dLCREATININE 0.90 mg/dLSGOT/AST 25.0 IU/LSGPT/ALT 35.0 IU/LALK 
PHOS 96.0 IU/LTOTAL PROTEIN 7.10 g/dLALBUMIN 4.20 g/dLTOTAL BILI 0.50 mg/
dLCALCIUM 10.30 mg/dLAGE 61 GFR NonAA 64 GFR AA 78 eGFR >60 mL/min/1.73meGFR 
AA* >60 TRIGLYCERIDES 181.0 mg/dLCHOLESTEROL 201.0 mg/dLHDL 47.0 mg/dLTOT CHOL/
HDL 4.3 LDL (CALC) 118.0 mg/dLMICROALBUMIN UR <0.5 ug/mLVITAMIN B12 485.0 pg/mL

 

 3/24/2018 1:00 PM  GLUCOSE 123.0 mg/dLSODIUM 140.0 mmol/LPOTASSIUM 4.20 mmol/
LCHLORIDE 104.0 mmol/LCO2 26.0 mmol/LBUN 30.0 mg/dLCREATININE 1.10 mg/dLSGOT/
AST 23.0 IU/LSGPT/ALT 28.0 IU/LALK PHOS 93.0 IU/LTOTAL PROTEIN 7.60 g/dLALBUMIN 
4.30 g/dLTOTAL BILI 0.50 mg/dLCALCIUM 11.20 mg/dLAGE 63 GFR NonAA 50 GFR AA 61 
eGFR 50 eGFR AA* >60 WBC 8.3 RBC 4.33 HGB 13.20 g/dLHCT 40.30 %MCV 93.0 fLMCH 
30.50 pgMCHC 32.80 g/dLRDW SD 44 RDW CV 12.80 %MPV 10.30 fLPLT 300 NRBC# 0.00 
NRBC% 0.0 %NEUT 66.90 %%LYMP 23.50 %%MONO 7.60 %%EOS 1.60 %%BASO 0.20 %#NEUT 
5.54 #LYMP 1.95 #MONO 0.63 #EOS 0.13 #BASO 0.02 MANUAL DIFF NOT IND HGB A1C 
5.80 %Est Avg Glucose 119.8 mg/dLTRIGLYCERIDES 154.0 mg/dLCHOLESTEROL 186.0 mg/
dLHDL 46.0 mg/dLTOT CHOL/HDL 4.0 LDL (CALC) 109.0 mg/dL







History Of Immunizations







 Name  Date Admin  Mfg Name  Mfg Code  Trade Name  Lot#  Route  Inj  Vis Given  
Vis Pub  CVX

 

 Tdap  2012  Shoutfit  SKB  ADACEL  [id number]  Intramuscular  Right 
Deltoid  2008  115

 

 Influenza  2015  Not Entered  NE  AFLURIA     Not Entered  Not Entered  1  141

 

 Influenza  2016  Not Entered  NE  AFLURIA     Not Entered  Not Entered  1  141







History of Past Illness







 Name  Date of Onset  Comments

 

 Hypertension  Dec  7 2009  1:45PM   

 

 Glucose Intolerance  Dec  7 2009  1:45PM   

 

 Hyperlipidemia  Dec  7 2009  1:45PM   

 

 Screening Mammogram  Dec  7 2009  1:45PM   

 

 Routine gynecological examination  Dec 21 2009  8:25AM   

 

 Hypertension Stable  Dec 21 2009  8:25AM   

 

 Hyperlipidemia, unspecified Stable  Dec 21 2009  8:25AM   

 

 Arthritis unspecified      

 

 Hyperlipidemia      

 

 Hypertension      

 

 Hypertension  Dec 17 2010  1:41PM   

 

 Glucose Intolerance  Dec 17 2010  1:41PM   

 

 Hyperlipidemia  Dec 17 2010  1:41PM   

 

 Hyperglycemia  Dec 17 2010  1:41PM   

 

 Routine gynecological examination  Dec 20 2010  1:52PM   

 

 Overactive bladder  Dec 20 2010  1:52PM   

 

 Hypertension  2011 12:05PM   

 

 Glucose Intolerance  2011 12:05PM   

 

 Hyperlipidemia  2011 12:05PM   

 

 Hyperglycemia  2011 12:05PM   

 

 Routine gynecological examination  Dec  8 2011  8:45AM   

 

 Overactive bladder  Dec  8 2011  8:45AM   

 

 Screening Examination for Breast Cancer  Dec  8 2011  8:45AM   

 

 Hyperglycemia  Dec  8 2011  8:45AM   

 

 Laceration 2nd digit right hand  May 21 2012 10:59AM   

 

 Acute Pharyngitis  Oct  8 2012  8:30AM   

 

 Hypertension  Dec 10 2012 11:59AM   

 

 Glucose Intolerance  Dec 10 2012 11:59AM   

 

 Hyperlipidemia  Dec 10 2012 11:59AM   

 

 Hyperglycemia  Dec 10 2012 11:59AM   

 

 Screening Examination for Breast Cancer  Dec 19 2012  8:02AM   

 

 Hypertension  Dec 19 2012  8:02AM   

 

 Hyperlipidemia, Mixed  Dec 19 2012  8:02AM   

 

 Hyperglycemia  Dec 19 2012  8:02AM   

 

 Upper Respiratory Infections  May 23 2013 10:32AM   

 

 Eczema  Aug 14 2013  9:49AM   

 

 Eustachian Tube Dysfunction, Bilateral  Aug 14 2013  9:49AM   

 

 Worried well  Sep 16 2013  9:56AM   

 

 Hypertension  Dec  2 2013 11:57AM   

 

 Glucose Intolerance  Dec  2 2013 11:57AM   

 

 Hyperlipidemia  Dec  2 2013 11:57AM   

 

 Hyperglycemia  Dec  2 2013 11:57AM   

 

 Routine gynecological examination  Dec 17 2013  8:06AM   

 

 Screening Examination for Breast Cancer  Dec 17 2013  8:06AM   

 

 Hypertension  2014 11:58AM   

 

 Glucose Intolerance  2014 11:58AM   

 

 Hyperlipidemia  2014 11:58AM   

 

 Hyperglycemia  2014 11:58AM   

 

 Essential Hypertension  2014 10:39AM   

 

 Diabetes Mellitus, Type II  2014 10:39AM   

 

 Hyperlipidemia, Mixed  2014 10:39AM   

 

 Sinusitis, Acute  Sep 14 2015  9:52AM   

 

 Bronchitis, Acute  Sep 14 2015  9:52AM   

 

 Arthritis unspecified  Oct 26 2015  8:37AM   

 

 Hypertension  Oct 20 2016  9:16AM   

 

 Diabetes Mellitus, Type II  Oct 20 2016  9:16AM   

 

 Hyperlipidemia  Oct 20 2016  9:16AM   

 

 Routine gynecological examination  2016  8:11AM   

 

 Screening Examination for Breast Cancer  2016  8:11AM   

 

 Visit for screening mammogram  2016  8:11AM   

 

 Hyperlipidemia  2016  8:11AM   

 

 Hypertension  2016  8:11AM   

 

 Controlled diabetes mellitus type II without complication  2016  8:11AM
   

 

 Pain in joint of right shoulder  2017  9:41AM   

 

 Acute bronchitis  Mar 27 2017  9:57AM   

 

 Acute non-recurrent maxillary sinusitis  Mar 27 2017  9:57AM   

 

 Hyperlipidemia  Oct 12 2017  8:08AM   

 

 Morbid Obesity  Oct 12 2017  8:08AM   

 

 Hypertension  Oct 12 2017  8:08AM   

 

 Hypertension  Mar 19 2018 10:08AM   

 

 Hyperlipemia  Mar 19 2018 10:08AM   

 

 Morbid obesity  Mar 19 2018 10:08AM   

 

 Family history of diabetes mellitus  Mar 19 2018 10:08AM   

 

 Hyperlipidemia  2018  8:05AM   

 

 Morbid Obesity  2018  8:05AM   

 

 Hypertension  2018  8:05AM   







Payers







 Insurance Name  Company Name  Plan Name  Plan Number  Policy Number  Policy 
Group Number  Start Date

 

    Medicare RHC  Medicare RHC     [id number]     2008

 

    Medicare Part B  [employer] [place]     [id number]     2008

 

    Medicare Part A  Medicare Part A     [id number]     2008

 

    Medicare Part A  Medicare - Lab/Xray     [id number]     2008







History of Encounters







 Visit Date  Visit Type  Provider

 

 2018  Office visit  Brain Hauser DO

 

 10/12/2017  Office visit  Brain Hauser DO

 

 3/27/2017  Office visit  Brain Hauser DO

 

 2017  Office visit  Brain Hauser DO

 

 2016  Office visit  Brain Hasuer DO

 

 10/26/2015  Office visit  Brain Hauser DO

 

 2015  Office visit  Brain Hauser DO

 

 2014  Office visit  Brain Hauser DO

 

 2013  Office visit  Brain Shafferte DO

 

 2013  Office visit  Brain Murtaza DO

 

 2013  Office visit  Brain Hauser DO

 

 2013  Office visit  Margie Zurita APRN

 

 2012  Office visit  Brain Hauser DO

 

 10/8/2012  Office visit  Brain Hauser DO

 

 2012  Office visit  Brain Hauser DO

 

 2011  Office visit  Brain Hauser DO

 

 2010  Office visit  Brain Shafferte DO

 

 2009  Office visit  Brain Murtaza DO

 

 2009  Office visit  Brain Hauser DO

## 2019-02-11 NOTE — XMS REPORT
MU2 Ambulatory Summary

 Created on: 2017



Elena Deluca

External Reference #: [id number]

: 1955

Sex: Female



Demographics







 Address  [address] [place]  [address]

 

 Home Phone  [phone]

 

 Preferred Language  English

 

 Marital Status  

 

 Hindu Affiliation  Unknown

 

 Race  White

 

 Ethnic Group  Not  or 





Author







 Author  Brain Hauser

 

 [facility]

 

 Address  [address]

[place], [place]  [id number]



 

 Phone  [phone]







Care Team Providers







 Care Team Member Name  Role  Phone

 

 Brain Hauser  PCP  Unavailable

 

 Brain Hauser  PreferredProvider  Unavailable







Allergies and Adverse Reactions







 Name  Reaction  Notes

 

 NO KNOWN DRUG ALLERGIES      







Plan of Treatment

Not available.



Medications







 Active 

 

 Name  Start Date  Estimated Completion Date  SIG  Comments

 

 Calcium 600mg        one tablet daily   

 

 Multivitamin Oral Tablet        take 1 tablet by oral route daily   

 

 aspirin 81 mg oral tablet        take 1 tablet (81 mg) by oral route once 
daily   

 

 Fish Oil 1,000 mg oral capsule  2010     take 2 capsules by oral route 
daily   

 

 valsartan-hydrochlorothiazide 160-12.5 mg oral tablet  2014     TAKE ONE 
TABLET BY MOUTH EVERY DAY   

 

 simvastatin 20 mg oral tablet  2014     TAKE ONE TABLET BY MOUTH EVERY 
DAY IN THE EVENING   

 

 Osteo Bi-Flex 250-200 mg oral tablet        take 1 tablet by oral route daily 
  

 

 simvastatin 20 mg oral tablet  2014     TAKE ONE TABLET BY MOUTH EVERY 
DAY IN THE EVENING   

 

 valsartan-hydrochlorothiazide 160-12.5 mg oral tablet  2015     TAKE ONE 
TABLET BY MOUTH EVERY DAY   

 

 simvastatin 20 mg oral tablet  2015     TAKE ONE TABLET BY MOUTH ONCE 
DAILY AT BEDTIME   

 

 valsartan-hydrochlorothiazide 160-12.5 mg oral tablet  2015     TAKE ONE 
TABLET BY MOUTH ONCE DAILY   

 

 valsartan-hydrochlorothiazide 160-12.5 mg oral tablet  8/3/2015     TAKE ONE 
TABLET BY MOUTH ONCE DAILY   

 

 simvastatin 20 mg oral tablet  2015     TAKE ONE TABLET BY MOUTH ONCE 
DAILY AT BEDTIME   

 

 amlodipine 10 mg oral tablet  10/18/2015     TAKE ONE TABLET BY MOUTH ONCE 
DAILY   

 

 valsartan-hydrochlorothiazide 160-12.5 mg oral tablet  2016     TAKE ONE 
TABLET BY MOUTH ONCE DAILY   

 

 simvastatin 20 mg oral tablet  2016     TAKE ONE TABLET BY MOUTH ONCE 
DAILY AT BEDTIME   

 

 valsartan-hydrochlorothiazide 160-12.5 mg oral tablet  2016     TAKE ONE 
TABLET BY MOUTH ONCE DAILY   

 

 simvastatin 20 mg oral tablet  2016     TAKE ONE TABLET BY MOUTH ONCE 
DAILY AT BEDTIME   

 

 amlodipine 10 mg oral tablet  10/9/2016     TAKE ONE TABLET BY MOUTH ONCE 
DAILY   

 

 simvastatin 20 mg oral tablet  2016     TAKE ONE TABLET BY MOUTH ONCE 
DAILY AT BEDTIME   

 

 amlodipine 10 mg oral tablet  2017     TAKE ONE TABLET BY MOUTH ONCE DAILY
   

 

 valsartan-hydrochlorothiazide 160-12.5 mg oral tablet  2017     TAKE ONE 
TABLET BY MOUTH ONCE DAILY   

 

 cyclobenzaprine 10 mg oral tablet  2017     take 1 tablet (10 mg) by oral 
route at bedtime   

 

 promethazine-codeine 6.25-10 mg/5 mL oral syrup  3/27/2017     take 5 
milliliters by oral route every 6 hours as needed, not to exceed 30 mL in 24 
hours   









  

 

 Name  Start Date  Expiration Date  SIG  Comments

 

 Augmentin 500-125 mg oral tablet  10/8/2012  10/15/2012  take 1 tablet by oral 
route every 12 hours for 7 days   

 

 Diflucan 100 mg oral tablet  10/8/2012  10/11/2012  take 1 tablet (100 mg) by 
oral route once daily   

 

 amoxicillin 500 mg oral capsule  2013  take 1 capsule (500 mg) 
by oral route 3 times per day for 10 days   

 

 simvastatin 20 mg oral tablet  9/10/2013  10/10/2013  TAKE ONE TABLET BY MOUTH 
EVERY DAY IN THE EVENING   

 

 amlodipine 10 mg oral tablet  2014  TAKE ONE TABLET BY MOUTH 
EVERY DAY   

 

 Augmentin 500-125 mg oral tablet  2015  take 1 tablet by oral 
route every 12 hours for 7 days   

 

 clindamycin HCl 300 mg oral capsule  3/27/2017  4/3/2017  take 1 capsule (300 
mg) by oral route 2 times per day for 7 days   









 Discontinued 

 

 Name  Start Date  Discontinued Date  SIG  Comments

 

 niacin 500 mg oral tablet extended release 24 hr     2011  take 1 tablets 
(500 mg) by oral route once daily  Starting on Simvastatin

 

 Vesicare 5 mg oral tablet  2011  take 1 tablet by oral route 
daily for 30 days   

 

 Osteo Bi-Flex (5-Loxin) 1,500-400-100 mg-unit-mg oral tablet     2013  
take 1 tablet by oral route daily   

 

 Diovan -12.5 mg oral tablet  2012  TAKE ONE TABLET BY 
MOUTH EVERY DAY  generic on list

 

 Medrol (Lei) 4 mg oral tablets,dose pack  2013  take as 
directed   

 

 nystatin-triamcinolone 100,000-0.1 unit/g-% topical cream  2013  apply to affected area(s) by topical route 2 times a day   

 

 fexofenadine 180 mg oral tablet  2013  take 1 tablet (180 mg) 
by oral route once daily   

 

 prednisone 20 mg oral tablet  10/26/2015  2016  take 3 tablets (60 mg) by 
oral route once daily for 3 days then 2 tablets (40 mg) daily for 2 days   

 

 cyclobenzaprine 10 mg oral tablet  10/30/2015  2016  take 1 tablet (10 mg
) by oral route 3 times per day   







Problem List







 Description  Status  Onset

 

 Arthritis unspecified  Active   

 

 Hyperlipidemia  Active   

 

 Hypertension  Active   







Vital Signs







 Date  Time  BP-Sys(mm[Hg]  BP-Edelmira(mm[Hg])  HR(bpm)  RR(rpm)  Temp  WT  HT  HC  
BMI  BSA  BMI Percentile  O2 Sat(%)

 

 3/27/2017  9:55:00 AM  127 mmHg  72 mmHg  76 bpm  22 rpm  98.1 F  393.5 lbs  
63 in     69.70 kg/m2  2.82 m2     97 %

 

 2017  9:36:00 AM  132 mmHg  72 mmHg  76 bpm  20 rpm  97.2 F  396.25 lbs  
64 in     68.0155 kg/m  2.8489 m     97 %

 

 2016  7:58:00 AM  136 mmHg  70 mmHg  85 bpm  20 rpm  98.1 F  391 lbs  64 
in     67.11 kg/m2  2.83 m2     95 %

 

 10/26/2015  8:35:00 AM  124 mmHg  70 mmHg  80 bpm  18 rpm  97.1 F     64 in   
            

 

 2015  9:50:00 AM  132 mmHg  88 mmHg  90 bpm  22 rpm  98.5 F  387 lbs  64 
in     66.43 kg/m2  2.82 m2     97 %

 

 2014  10:36:00 AM  142 mmHg  80 mmHg  78 bpm  22 rpm  97.9 F  381 lbs  64 
in     65.3978 kg/m  2.7935 m     97 %

 

 2013  8:04:00 AM  138 mmHg  70 mmHg  88 bpm  22 rpm  98.1 F  388 lbs  64 
in     66.60 kg/m2  2.82 m2     98 %

 

 2013  9:54:00 AM  136 mmHg  70 mmHg  88 bpm  24 rpm  97.1 F  386 lbs  64 
in     66.2561 kg/m  2.8118 m     97 %

 

 2013  9:47:00 AM  134 mmHg  78 mmHg  70 bpm  16 rpm  97.8 F  381 lbs  64 
in     65.40 kg/m2  2.79 m2      

 

 2013  10:27:00 AM  136 mmHg  84 mmHg  82 bpm  22 rpm  98.2 F  384.2 lbs  
64 in     65.9471 kg/m  2.8052 m     96 %

 

 2012  7:59:00 AM  138 mmHg  72 mmHg  70 bpm  18 rpm  98 F  377 lbs  64 
in     64.71 kg/m2  2.78 m2      

 

 10/8/2012  8:29:00 AM  138 mmHg  70 mmHg  72 bpm  18 rpm  98.1 F  371 lbs  64 
in     63.6814 kg/m  2.7566 m      

 

 2012  10:57:00 AM  136 mmHg  72 mmHg  68 bpm  18 rpm  98 F  371 lbs  64 
in     63.68 kg/m2  2.76 m2      

 

 2011  8:43:00 AM  144 mmHg  78 mmHg  72 bpm  18 rpm  98.2 F  396 lbs  64 
in     67.97 kg/m2  2.848 m      

 

 2010  1:51:00 PM  142 mmHg  80 mmHg  76 bpm  16 rpm  98.7 F  382 lbs  64 
in     65.5695 kg/m  2.80 m2      

 

 2009  8:23:00 AM  132 mmHg  78 mmHg  70 bpm  24 rpm  98.3 F  377 lbs  64 
in     64.71 kg/m2  2.7788 m      







Social History







 Name  Description  Comments

 

 Tobacco  Former smoker  for 2 years

 

       







History of Procedures







 Date Ordered  Description  Order Status

 

 10/26/2015 12:00 AM  X-RAY EXAM OF HIP  Reviewed

 

 10/26/2015 12:00 AM  X-RAY EXAM OF KNEE 3  Reviewed

 

 2011 12:00 AM  COMPREHEN METABOLIC PANEL  Reviewed

 

 2011 12:00 AM  LIPID PANEL  Reviewed

 

 2011 12:00 AM  GLYCOSYLATED HEMOGLOBIN TEST  Reviewed

 

 2011 12:00 AM  CYTOPATH C/V THIN LAYER  Reviewed

 

 2011 12:00 AM  MAMMOGRAM SCREENING  Reviewed

 

 2012 12:00 AM  ASSAY GLUCOSE BLOOD QUANT  Reviewed

 

 10/20/2016 12:00 AM  COMPREHEN METABOLIC PANEL  Reviewed

 

 10/20/2016 12:00 AM  LIPID PANEL  Reviewed

 

 10/20/2016 12:00 AM  GLYCOSYLATED HEMOGLOBIN TEST  Reviewed

 

 10/20/2016 12:00 AM  MICROALBUMIN QUANTITATIVE  Reviewed

 

 10/20/2016 12:00 AM  VITAMIN B-12  Reviewed

 

 2017 12:00 AM  RADEX SHOULDER COMPLETE MINIMUM 2 VIEWS  Reviewed

 

 2017 12:00 AM  RADEX HUMERUS MINIMUM 2 VIEWS  Reviewed

 

 2017 12:00 AM  Decadron 8mg Injection  Reviewed

 

 2017 12:00 AM  Depo-Medrol 80mg Injection  Reviewed

 

 2012 12:00 AM  TDAP VACCINE 7 YRS/> IM  Reviewed

 

 2009 12:00 AM  COMPREHEN METABOLIC PANEL  Reviewed

 

 2009 12:00 AM  LIPID PANEL  Reviewed

 

 2009 12:00 AM  GLYCOSYLATED HEMOGLOBIN TEST  Reviewed

 

 2009 12:00 AM  MAMMOGRAM SCREENING  Reviewed

 

 12/10/2012 12:00 AM  COMPREHEN METABOLIC PANEL  Reviewed

 

 12/10/2012 12:00 AM  LIPID PANEL  Reviewed

 

 12/10/2012 12:00 AM  GLYCOSYLATED HEMOGLOBIN TEST  Reviewed

 

 2012 12:00 AM  MAMMOGRAM SCREENING  Reviewed

 

 2012 12:00 AM  ASSAY GLUCOSE BLOOD QUANT  Reviewed

 

 2013 12:00 AM  COMPREHEN METABOLIC PANEL  Reviewed

 

 2013 12:00 AM  LIPID PANEL  Reviewed

 

 2013 12:00 AM  GLYCOSYLATED HEMOGLOBIN TEST  Reviewed

 

 2013 12:00 AM  CYTOPATH C/V THIN LAYER  Reviewed

 

 2013 12:00 AM  MAMMOGRAM SCREENING  Reviewed

 

 2009 12:00 AM  CYTOPATH C/V THIN LAYER  Reviewed

 

 2010 12:00 AM  COMPREHEN METABOLIC PANEL  Reviewed

 

 2010 12:00 AM  LIPID PANEL  Reviewed

 

 2010 12:00 AM  GLYCOSYLATED HEMOGLOBIN TEST  Reviewed

 

 2010 12:00 AM  CYTOPATH C/V THIN LAYER  Reviewed

 

 2010 12:00 AM  MAMMOGRAM SCREENING  Reviewed

 

 2014 12:00 AM  COMPREHEN METABOLIC PANEL  Reviewed

 

 2014 12:00 AM  LIPID PANEL  Reviewed

 

 2014 12:00 AM  GLYCOSYLATED HEMOGLOBIN TEST  Reviewed







Results Summary







 Data and Description  Results

 

 2009 10:01 AM  .0 mg/dLCHOLESTEROL 258 mg/dLTRIGLYCERIDES 252.0 mg
/dLHDL 51.0 mg/dLTOT CHOL/HDL 5.1 .0 mg/dLTRIGLYCERIDES 252.0 mg/dLTOT 
CHOL/HDL 5.1 HDL 51.0 mg/dLCHOLESTEROL 258 mg/dLGLYCOHEMOGLOBIN A1C 6.3 
GLYCOHEMOGLOBIN A1C 6.3 GLUCOSE 116 SODIUM 144.0 mmol/LPOTASSIUM 4.80 mmol/
LCHLORIDE 105.0 mmol/LCO2 24.0 mmol/LBUN 18.0 mg/dLCREATININE 0.80 mg/dLSGOT/
AST 23.0 IU/LSGPT/ALT 26.0 IU/LALK PHOS 98.0 IU/LTOTAL PROTEIN 7.30 g/dLALBUMIN 
4.20 g/dLTOTAL BILI 0.40 mg/dLCALCIUM 10.30 mg/dLAGE 54 GFR NonAA 75 GFR AA 91 
eGFR >60 mL/mineGFR AA* >60 GLUCOSE 116 SODIUM 144.0 mmol/LPOTASSIUM 4.80 mmol/
LCHLORIDE 105.0 mmol/LCO2 24.0 mmol/LBUN 18.0 mg/dLCREATININE 0.80 mg/dLSGOT/
AST 23.0 IU/LSGPT/ALT 26.0 IU/LALK PHOS 98.0 IU/LTOTAL PROTEIN 7.30 g/dLALBUMIN 
4.20 g/dLTOTAL BILI 0.40 mg/dLCALCIUM 10.30 mg/dLAGE 54 GFR NonAA 75 GFR AA 91 
eGFR >60 mL/mineGFR AA* >60 

 

 2011 9:00 AM  GLUCOSE 130.0 mg/dLSODIUM 139.0 mmol/LPOTASSIUM 4.40 mmol/
LCHLORIDE 104.0 mmol/LCO2 26.0 mmol/LBUN 21.0 mg/dLCREATININE 0.80 mg/dLSGOT/
AST 19.0 IU/LSGPT/ALT 25.0 IU/LALK PHOS 108.0 IU/LTOTAL PROTEIN 7.10 g/
dLALBUMIN 4.20 g/dLTOTAL BILI 0.40 mg/dLCALCIUM 10.20 mg/dLAGE 56 GFR NonAA 74 
GFR AA 90 eGFR >60 mL/min/1.73 m2eGFR AA* >60 TRIGLYCERIDES 227.0 mg/
dLCHOLESTEROL 282.0 mg/dLHDL 45.0 mg/dLTOT CHOL/HDL 6.3 LDL (CALC) 192.0 mg/
dLGLYCOHEMOGLOBIN A1C 6.10 %

 

 2012 9:42 AM  GLUCOSE 131.0 mg/dL

 

 2012 10:02 AM  GLUCOSE 150.0 mg/dLSODIUM 140.0 mmol/LPOTASSIUM 4.60 mmol/
LCHLORIDE 104.0 mmol/LCO2 25.0 mmol/LBUN 21.0 mg/dLCREATININE 0.90 mg/dLSGOT/
AST 19.0 IU/LSGPT/ALT 26.0 IU/LALK PHOS 99.0 IU/LTOTAL PROTEIN 7.10 g/dLALBUMIN 
4.20 g/dLTOTAL BILI 0.50 mg/dLCALCIUM 10.80 mg/dLAGE 57 GFR NonAA 65 GFR AA 79 
eGFR 60 eGFR AA* 60 TRIGLYCERIDES 210.0 mg/dLCHOLESTEROL 241.0 mg/dLHDL 48.0 mg/
dLTOT CHOL/HDL 5.0 LDL (CALC) 151.0 mg/dLGLYCOHEMOGLOBIN A1C 6.30 %

 

 2012 8:54 AM  GLUCOSE 125.0 mg/dL

 

 12/3/2013 8:58 AM  TRIGLYCERIDES 210.0 mg/dLCHOLESTEROL 211.0 mg/dLHDL 49.0 mg/
dLTOT CHOL/HDL 4.3 LDL (CALC) 120.0 mg/dLGLUCOSE 125.0 mg/dLSODIUM 140.0 mmol/
LPOTASSIUM 4.20 mmol/LCHLORIDE 105.0 mmol/LCO2 25.0 mmol/LBUN 24.0 mg/
dLCREATININE 0.90 mg/dLSGOT/AST 23.0 IU/LSGPT/ALT 28.0 IU/LALK PHOS 111.0 IU/
LTOTAL PROTEIN 7.0 g/dLALBUMIN 4.30 g/dLTOTAL BILI 0.60 mg/dLCALCIUM 10.60 mg/
dLAGE 58 GFR NonAA 64 GFR AA 78 eGFR >60 mL/min/1.73 m2eGFR AA* >60 HGB A1C 
6.90 %Est Avg Glucose 151.3 mg/dL

 

 2014 9:05 AM  TRIGLYCERIDES 210.0 mg/dLCHOLESTEROL 204.0 mg/dLHDL 51.0 mg/
dLTOT CHOL/HDL 4.0 LDL (CALC) 111.0 mg/dLGLUCOSE 120.0 mg/dLSODIUM 141.0 mmol/
LPOTASSIUM 4.40 mmol/LCHLORIDE 103.0 mmol/LCO2 25.0 mmol/LBUN 18.0 mg/
dLCREATININE 0.90 mg/dLSGOT/AST 24.0 IU/LSGPT/ALT 29.0 IU/LALK PHOS 98.0 IU/
LTOTAL PROTEIN 7.0 g/dLALBUMIN 4.10 g/dLTOTAL BILI 0.60 mg/dLCALCIUM 10.20 mg/
dLAGE 59 GFR NonAA 64 GFR AA 78 eGFR 60 eGFR AA* 60 HGB A1C 6.80 %Est Avg 
Glucose 148.5 mg/dL

 

 10/24/2016 10:22 AM  HGB A1C 6.50 %Est Avg Glucose 139.9 mg/dLGLUCOSE 129.0 mg/
dLSODIUM 143.0 mmol/LPOTASSIUM 4.40 mmol/LCHLORIDE 104.0 mmol/LCO2 24.0 mmol/
LBUN 25.0 mg/dLCREATININE 0.90 mg/dLSGOT/AST 25.0 IU/LSGPT/ALT 35.0 IU/LALK 
PHOS 96.0 IU/LTOTAL PROTEIN 7.10 g/dLALBUMIN 4.20 g/dLTOTAL BILI 0.50 mg/
dLCALCIUM 10.30 mg/dLAGE 61 GFR NonAA 64 GFR AA 78 eGFR >60 mL/min/1.73meGFR 
AA* >60 TRIGLYCERIDES 181.0 mg/dLCHOLESTEROL 201.0 mg/dLHDL 47.0 mg/dLTOT CHOL/
HDL 4.3 LDL (CALC) 118.0 mg/dLMICROALBUMIN UR <0.5 ug/mLVITAMIN B12 485.0 pg/mL







History Of Immunizations







 Name  Date Admin  Mfg Name  Mfg Code  Trade Name  Lot#  Route  Inj  Vis Given  
Vis Pub  CVX

 

 Tdap  2012  Thomas Golf  SKB  ADACEL  [id number]  Intramuscular  Right 
Deltoid  2008  115

 

 Influenza  2015  Not Entered  NE  Afluria     Not Entered  Not Entered  1  141

 

 Influenza  2016  Not Entered  NE  Afluria     Not Entered  Not Entered  1  141







History of Past Illness







 Name  Date of Onset  Comments

 

 Hypertension  Dec  7 2009  1:45PM   

 

 Glucose Intolerance  Dec  7 2009  1:45PM   

 

 Hyperlipidemia  Dec  7 2009  1:45PM   

 

 Screening Mammogram  Dec  7 2009  1:45PM   

 

 Routine gynecological examination  Dec 21 2009  8:25AM   

 

 Hypertension Stable  Dec 21 2009  8:25AM   

 

 Hyperlipidemia, unspecified Stable  Dec 21 2009  8:25AM   

 

 Arthritis unspecified      

 

 Hyperlipidemia      

 

 Hypertension      

 

 Hypertension  Dec 17 2010  1:41PM   

 

 Glucose Intolerance  Dec 17 2010  1:41PM   

 

 Hyperlipidemia  Dec 17 2010  1:41PM   

 

 Hyperglycemia  Dec 17 2010  1:41PM   

 

 Routine gynecological examination  Dec 20 2010  1:52PM   

 

 Overactive bladder  Dec 20 2010  1:52PM   

 

 Hypertension  2011 12:05PM   

 

 Glucose Intolerance  2011 12:05PM   

 

 Hyperlipidemia  2011 12:05PM   

 

 Hyperglycemia  2011 12:05PM   

 

 Routine gynecological examination  Dec  8 2011  8:45AM   

 

 Overactive bladder  Dec  8 2011  8:45AM   

 

 Screening Examination for Breast Cancer  Dec  8 2011  8:45AM   

 

 Hyperglycemia  Dec  8 2011  8:45AM   

 

 Laceration 2nd digit right hand  May 21 2012 10:59AM   

 

 Acute Pharyngitis  Oct  8 2012  8:30AM   

 

 Hypertension  Dec 10 2012 11:59AM   

 

 Glucose Intolerance  Dec 10 2012 11:59AM   

 

 Hyperlipidemia  Dec 10 2012 11:59AM   

 

 Hyperglycemia  Dec 10 2012 11:59AM   

 

 Screening Examination for Breast Cancer  Dec 19 2012  8:02AM   

 

 Hypertension  Dec 19 2012  8:02AM   

 

 Hyperlipidemia, Mixed  Dec 19 2012  8:02AM   

 

 Hyperglycemia  Dec 19 2012  8:02AM   

 

 Upper Respiratory Infections  May 23 2013 10:32AM   

 

 Eczema  Aug 14 2013  9:49AM   

 

 Eustachian Tube Dysfunction, Bilateral  Aug 14 2013  9:49AM   

 

 Worried well  Sep 16 2013  9:56AM   

 

 Hypertension  Dec  2 2013 11:57AM   

 

 Glucose Intolerance  Dec  2 2013 11:57AM   

 

 Hyperlipidemia  Dec  2 2013 11:57AM   

 

 Hyperglycemia  Dec  2 2013 11:57AM   

 

 Routine gynecological examination  Dec 17 2013  8:06AM   

 

 Screening Examination for Breast Cancer  Dec 17 2013  8:06AM   

 

 Hypertension  2014 11:58AM   

 

 Glucose Intolerance  2014 11:58AM   

 

 Hyperlipidemia  2014 11:58AM   

 

 Hyperglycemia  2014 11:58AM   

 

 Essential Hypertension  2014 10:39AM   

 

 Diabetes Mellitus, Type II  2014 10:39AM   

 

 Hyperlipidemia, Mixed  2014 10:39AM   

 

 Sinusitis, Acute  Sep 14 2015  9:52AM   

 

 Bronchitis, Acute  Sep 14 2015  9:52AM   

 

 Arthritis unspecified  Oct 26 2015  8:37AM   

 

 Hypertension  Oct 20 2016  9:16AM   

 

 Diabetes Mellitus, Type II  Oct 20 2016  9:16AM   

 

 Hyperlipidemia  Oct 20 2016  9:16AM   

 

 Routine gynecological examination  2016  8:11AM   

 

 Screening Examination for Breast Cancer  2016  8:11AM   

 

 Visit for screening mammogram  2016  8:11AM   

 

 Hyperlipidemia  2016  8:11AM   

 

 Hypertension  2016  8:11AM   

 

 Controlled diabetes mellitus type II without complication  2016  8:11AM
   

 

 Pain in joint of right shoulder  2017  9:41AM   

 

 Acute bronchitis  Mar 27 2017  9:57AM   

 

 Acute non-recurrent maxillary sinusitis  Mar 27 2017  9:57AM   







Payers







 Insurance Name  Company Name  Plan Name  Plan Number  Policy Number  Policy 
Group Number  Start Date

 

    Medicare [facility]  Medicare [facility]     [id number]     N/A

 

    Medicare Part B  [employer] [place]     [id number]     2008

 

    Medicare Part A  Medicare Part A     [id number]     2008

 

    Medicare Part A  Medicare - Lab/Xray     [id number]     2008







History of Encounters







 Visit Date  Visit Type  Provider

 

 3/27/2017  Office visit  Brain Hauser DO

 

 2017  Office visit  Brain Hauser DO

 

 2016  Office visit  Brain Hauser DO

 

 10/26/2015  Office visit  Brain Hauser DO

 

 2015  Office visit  Brain Hauser DO

 

 2014  Office visit  Brain Hauser DO

 

 2013  Office visit  Brain Hauser DO

 

 2013  Office visit  Brain Hauser DO

 

 2013  Office visit  Brain Hauser DO

 

 2013  Office visit  Margie RODAS

 

 2012  Office visit  Brain Hauser DO

 

 10/8/2012  Office visit  Brain Hauser DO

 

 2012  Office visit  Brain Hauser DO

 

 2011  Office visit  Brain Hauser DO

 

 2010  Office visit  Brain Hauser DO

 

 2009  Office visit  Brain Hauser DO

 

 2009  Office visit  Brain Hauser DO

## 2019-02-11 NOTE — XMS REPORT
MU2 Ambulatory Summary

 Created on: 2018



Elena Deluca

External Reference #: [id number]

: 1955

Sex: Female



Demographics







 Address  [address] [place]  [address]

 

 Home Phone  [phone]

 

 Preferred Language  English

 

 Marital Status  

 

 Yarsanism Affiliation  Unknown

 

 Race  White

 

 Ethnic Group  Not  or 





Author







 Author  Brain Hauser

 

 [facility]

 

 Address  [address]

[place], [place]  [id number]



 

 Phone  [phone]







Care Team Providers







 Care Team Member Name  Role  Phone

 

 Brain Hauser  PCP  [phone]

 

 Brain Hauser  PreferredProvider  [phone]







Allergies and Adverse Reactions







 Name  Reaction  Notes

 

 NO KNOWN DRUG ALLERGIES      







Plan of Treatment







 Planned Activity  Comments  Planned Date  Planned Time  Plan/Goal

 

 CBC W/ AUTO DIFF (RFLX MAN DIFF IF IND).     3/19/2018  12:00 AM   

 

 CMP     3/19/2018  12:00 AM   

 

 LIPID PANEL     3/19/2018  12:00 AM   

 

 HEMOGLOBIN A1C     3/19/2018  12:00 AM   

 

 Discuss Bariatric Surgery options. She needs to lose 100 lbs in order to have 
Left Total Knee Replacement.            







Medications







 Active 

 

 Name  Start Date  Estimated Completion Date  SIG  Comments

 

 Calcium 600mg        one tablet daily   

 

 Multivitamin Oral Tablet        take 1 tablet by oral route daily   

 

 aspirin 81 mg oral tablet        take 1 tablet (81 mg) by oral route once 
daily   

 

 Fish Oil 1,000 mg oral capsule  2010     take 2 capsules by oral route 
daily   

 

 valsartan-hydrochlorothiazide 160-12.5 mg oral tablet  2014     TAKE ONE 
TABLET BY MOUTH EVERY DAY   

 

 simvastatin 20 mg oral tablet  2014     TAKE ONE TABLET BY MOUTH EVERY 
DAY IN THE EVENING   

 

 Osteo Bi-Flex 250-200 mg oral tablet        take 1 tablet by oral route daily 
  

 

 simvastatin 20 mg oral tablet  2014     TAKE ONE TABLET BY MOUTH EVERY 
DAY IN THE EVENING   

 

 valsartan-hydrochlorothiazide 160-12.5 mg oral tablet  2015     TAKE ONE 
TABLET BY MOUTH EVERY DAY   

 

 simvastatin 20 mg oral tablet  2015     TAKE ONE TABLET BY MOUTH ONCE 
DAILY AT BEDTIME   

 

 valsartan-hydrochlorothiazide 160-12.5 mg oral tablet  2015     TAKE ONE 
TABLET BY MOUTH ONCE DAILY   

 

 valsartan-hydrochlorothiazide 160-12.5 mg oral tablet  8/3/2015     TAKE ONE 
TABLET BY MOUTH ONCE DAILY   

 

 simvastatin 20 mg oral tablet  2015     TAKE ONE TABLET BY MOUTH ONCE 
DAILY AT BEDTIME   

 

 amlodipine 10 mg oral tablet  10/18/2015     TAKE ONE TABLET BY MOUTH ONCE 
DAILY   

 

 valsartan-hydrochlorothiazide 160-12.5 mg oral tablet  2016     TAKE ONE 
TABLET BY MOUTH ONCE DAILY   

 

 simvastatin 20 mg oral tablet  2016     TAKE ONE TABLET BY MOUTH ONCE 
DAILY AT BEDTIME   

 

 valsartan-hydrochlorothiazide 160-12.5 mg oral tablet  2016     TAKE ONE 
TABLET BY MOUTH ONCE DAILY   

 

 simvastatin 20 mg oral tablet  2016     TAKE ONE TABLET BY MOUTH ONCE 
DAILY AT BEDTIME   

 

 amlodipine 10 mg oral tablet  10/9/2016     TAKE ONE TABLET BY MOUTH ONCE 
DAILY   

 

 amlodipine 10 mg oral tablet  2017     TAKE ONE TABLET BY MOUTH ONCE DAILY
   

 

 valsartan-hydrochlorothiazide 160-12.5 mg oral tablet  2017     TAKE ONE 
TABLET BY MOUTH ONCE DAILY   

 

 amlodipine 10 mg oral tablet  10/8/2017  2018  TAKE ONE TABLET BY MOUTH 
ONCE DAILY   

 

 cyclobenzaprine 10 mg oral tablet  10/12/2017     take 1 tablet (10 mg) by 
oral route at bedtime   









  

 

 Name  Start Date  Expiration Date  SIG  Comments

 

 Augmentin 500-125 mg oral tablet  10/8/2012  10/15/2012  take 1 tablet by oral 
route every 12 hours for 7 days   

 

 Diflucan 100 mg oral tablet  10/8/2012  10/11/2012  take 1 tablet (100 mg) by 
oral route once daily   

 

 amoxicillin 500 mg oral capsule  2013  take 1 capsule (500 mg) 
by oral route 3 times per day for 10 days   

 

 simvastatin 20 mg oral tablet  9/10/2013  10/10/2013  TAKE ONE TABLET BY MOUTH 
EVERY DAY IN THE EVENING   

 

 amlodipine 10 mg oral tablet  2014  TAKE ONE TABLET BY MOUTH 
EVERY DAY   

 

 Augmentin 500-125 mg oral tablet  2015  take 1 tablet by oral 
route every 12 hours for 7 days   

 

 clindamycin HCl 300 mg oral capsule  3/27/2017  4/3/2017  take 1 capsule (300 
mg) by oral route 2 times per day for 7 days   

 

 simvastatin 20 mg oral tablet  2017  TAKE ONE TABLET BY 
MOUTH ONCE DAILY AT BEDTIME   

 

 valsartan-hydrochlorothiazide 160-12.5 mg oral tablet  2018  
TAKE ONE TABLET BY MOUTH ONCE DAILY   









 Discontinued 

 

 Name  Start Date  Discontinued Date  SIG  Comments

 

 niacin 500 mg oral tablet extended release 24 hr     2011  take 1 tablets 
(500 mg) by oral route once daily  Starting on Simvastatin

 

 Vesicare 5 mg oral tablet  2011  take 1 tablet by oral route 
daily for 30 days   

 

 Osteo Bi-Flex (5-Loxin) 1,500-400-100 mg-unit-mg oral tablet     2013  
take 1 tablet by oral route daily   

 

 Diovan -12.5 mg oral tablet  2012  TAKE ONE TABLET BY 
MOUTH EVERY DAY  generic on list

 

 Medrol (Lei) 4 mg oral tablets,dose pack  2013  take as 
directed   

 

 nystatin-triamcinolone 100,000-0.1 unit/g-% topical cream  2013  apply to affected area(s) by topical route 2 times a day   

 

 fexofenadine 180 mg oral tablet  2013  take 1 tablet (180 mg) 
by oral route once daily   

 

 prednisone 20 mg oral tablet  10/26/2015  2016  take 3 tablets (60 mg) by 
oral route once daily for 3 days then 2 tablets (40 mg) daily for 2 days   

 

 cyclobenzaprine 10 mg oral tablet  10/30/2015  2016  take 1 tablet (10 mg
) by oral route 3 times per day   

 

 promethazine-codeine 6.25-10 mg/5 mL oral syrup  3/27/2017  10/12/2017  take 5 
milliliters by oral route every 6 hours as needed, not to exceed 30 mL in 24 
hours   







Problem List







 Description  Status  Onset

 

 Arthritis unspecified  Active   

 

 Hyperlipidemia  Active   

 

 Hypertension  Active   







Vital Signs







 Date  Time  BP-Sys(mm[Hg]  BP-Edelmira(mm[Hg])  HR(bpm)  RR(rpm)  Temp  WT  HT  HC  
BMI  BSA  BMI Percentile  O2 Sat(%)

 

 10/12/2017  8:06:00 AM  144 mmHg  82 mmHg  77 bpm  16 rpm  97.7 F  398 lbs  64 
in     68.32 kg/m2  2.86 m2     97 %

 

 3/27/2017  9:55:00 AM  127 mmHg  72 mmHg  76 bpm  22 rpm  98.1 F  393.5 lbs  
63 in     69.7047 kg/m  2.8167 m     97 %

 

 2017  9:36:00 AM  132 mmHg  72 mmHg  76 bpm  20 rpm  97.2 F  396.25 lbs  
64 in     68.02 kg/m2  2.85 m2     97 %

 

 2016  7:58:00 AM  136 mmHg  70 mmHg  85 bpm  20 rpm  98.1 F  391 lbs  64 
in     67.1143 kg/m  2.8299 m     95 %

 

 10/26/2015  8:35:00 AM  124 mmHg  70 mmHg  80 bpm  18 rpm  97.1 F     64 in   
            

 

 2015  9:50:00 AM  132 mmHg  88 mmHg  90 bpm  22 rpm  98.5 F  387 lbs  64 
in     66.4277 kg/m  2.8154 m     97 %

 

 2014  10:36:00 AM  142 mmHg  80 mmHg  78 bpm  22 rpm  97.9 F  381 lbs  64 
in     65.40 kg/m2  2.79 m2     97 %

 

 2013  8:04:00 AM  138 mmHg  70 mmHg  88 bpm  22 rpm  98.1 F  388 lbs  64 
in     66.5994 kg/m  2.8191 m     98 %

 

 2013  9:54:00 AM  136 mmHg  70 mmHg  88 bpm  24 rpm  97.1 F  386 lbs  64 
in     66.26 kg/m2  2.81 m2     97 %

 

 2013  9:47:00 AM  134 mmHg  78 mmHg  70 bpm  16 rpm  97.8 F  381 lbs  64 
in     65.3978 kg/m  2.7935 m      

 

 2013  10:27:00 AM  136 mmHg  84 mmHg  82 bpm  22 rpm  98.2 F  384.2 lbs  
64 in     65.95 kg/m2  2.81 m2     96 %

 

 2012  7:59:00 AM  138 mmHg  72 mmHg  70 bpm  18 rpm  98 F  377 lbs  64 
in     64.7112 kg/m  2.7788 m      

 

 10/8/2012  8:29:00 AM  138 mmHg  70 mmHg  72 bpm  18 rpm  98.1 F  371 lbs  64 
in     63.68 kg/m2  2.76 m2      

 

 2012  10:57:00 AM  136 mmHg  72 mmHg  68 bpm  18 rpm  98 F  371 lbs  64 
in     63.6814 kg/m  2.7566 m      

 

 2011  8:43:00 AM  144 mmHg  78 mmHg  72 bpm  18 rpm  98.2 F  396 lbs  64 
in     67.97 kg/m2  2.85 m2      

 

 2010  1:51:00 PM  142 mmHg  80 mmHg  76 bpm  16 rpm  98.7 F  382 lbs  64 
in     65.5695 kg/m  2.7972 m      

 

 2009  8:23:00 AM  132 mmHg  78 mmHg  70 bpm  24 rpm  98.3 F  377 lbs  64 
in     64.71 kg/m2  2.78 m2      







Social History







 Name  Description  Comments

 

 Tobacco  Former smoker  for 2 years

 

       







History of Procedures







 Date Ordered  Description  Order Status

 

 10/26/2015 12:00 AM  X-RAY EXAM OF HIP  Reviewed

 

 10/26/2015 12:00 AM  X-RAY EXAM OF KNEE 3  Reviewed

 

 2011 12:00 AM  COMPREHEN METABOLIC PANEL  Reviewed

 

 2011 12:00 AM  LIPID PANEL  Reviewed

 

 2011 12:00 AM  GLYCOSYLATED HEMOGLOBIN TEST  Reviewed

 

 2011 12:00 AM  CYTOPATH C/V THIN LAYER  Reviewed

 

 2011 12:00 AM  MAMMOGRAM SCREENING  Reviewed

 

 2012 12:00 AM  ASSAY GLUCOSE BLOOD QUANT  Reviewed

 

 10/20/2016 12:00 AM  COMPREHEN METABOLIC PANEL  Reviewed

 

 10/20/2016 12:00 AM  LIPID PANEL  Reviewed

 

 10/20/2016 12:00 AM  GLYCOSYLATED HEMOGLOBIN TEST  Reviewed

 

 10/20/2016 12:00 AM  MICROALBUMIN QUANTITATIVE  Reviewed

 

 10/20/2016 12:00 AM  VITAMIN B-12  Reviewed

 

 2017 12:00 AM  RADEX SHOULDER COMPLETE MINIMUM 2 VIEWS  Reviewed

 

 2017 12:00 AM  RADEX HUMERUS MINIMUM 2 VIEWS  Reviewed

 

 2017 12:00 AM  Decadron 8mg Injection  Reviewed

 

 2017 12:00 AM  Depo-Medrol 80mg Injection  Reviewed

 

 2012 12:00 AM  TDAP VACCINE 7 YRS/> IM  Reviewed

 

 2009 12:00 AM  COMPREHEN METABOLIC PANEL  Reviewed

 

 2009 12:00 AM  LIPID PANEL  Reviewed

 

 2009 12:00 AM  GLYCOSYLATED HEMOGLOBIN TEST  Reviewed

 

 2009 12:00 AM  MAMMOGRAM SCREENING  Reviewed

 

 12/10/2012 12:00 AM  COMPREHEN METABOLIC PANEL  Reviewed

 

 12/10/2012 12:00 AM  LIPID PANEL  Reviewed

 

 12/10/2012 12:00 AM  GLYCOSYLATED HEMOGLOBIN TEST  Reviewed

 

 2012 12:00 AM  MAMMOGRAM SCREENING  Reviewed

 

 2012 12:00 AM  ASSAY GLUCOSE BLOOD QUANT  Reviewed

 

 2013 12:00 AM  COMPREHEN METABOLIC PANEL  Reviewed

 

 2013 12:00 AM  LIPID PANEL  Reviewed

 

 2013 12:00 AM  GLYCOSYLATED HEMOGLOBIN TEST  Reviewed

 

 2013 12:00 AM  CYTOPATH C/V THIN LAYER  Reviewed

 

 2013 12:00 AM  MAMMOGRAM SCREENING  Reviewed

 

 2009 12:00 AM  CYTOPATH C/V THIN LAYER  Reviewed

 

 2010 12:00 AM  COMPREHEN METABOLIC PANEL  Reviewed

 

 2010 12:00 AM  LIPID PANEL  Reviewed

 

 2010 12:00 AM  GLYCOSYLATED HEMOGLOBIN TEST  Reviewed

 

 2010 12:00 AM  CYTOPATH C/V THIN LAYER  Reviewed

 

 2010 12:00 AM  MAMMOGRAM SCREENING  Reviewed

 

 2014 12:00 AM  COMPREHEN METABOLIC PANEL  Reviewed

 

 2014 12:00 AM  LIPID PANEL  Reviewed

 

 2014 12:00 AM  GLYCOSYLATED HEMOGLOBIN TEST  Reviewed







Results Summary







 Date and Description  Results

 

 2011 9:00 AM  GLUCOSE 130.0 mg/dLSODIUM 139.0 mmol/LPOTASSIUM 4.40 mmol/
LCHLORIDE 104.0 mmol/LCO2 26.0 mmol/LBUN 21.0 mg/dLCREATININE 0.80 mg/dLSGOT/
AST 19.0 IU/LSGPT/ALT 25.0 IU/LALK PHOS 108.0 IU/LTOTAL PROTEIN 7.10 g/
dLALBUMIN 4.20 g/dLTOTAL BILI 0.40 mg/dLCALCIUM 10.20 mg/dLAGE 56 GFR NonAA 74 
GFR AA 90 eGFR >60 mL/min/1.73 m2eGFR AA* >60 TRIGLYCERIDES 227.0 mg/
dLCHOLESTEROL 282.0 mg/dLHDL 45.0 mg/dLTOT CHOL/HDL 6.3 LDL (CALC) 192.0 mg/
dLGLYCOHEMOGLOBIN A1C 6.10 %

 

 2012 9:42 AM  GLUCOSE 131.0 mg/dL

 

 2012 10:02 AM  GLUCOSE 150.0 mg/dLSODIUM 140.0 mmol/LPOTASSIUM 4.60 mmol/
LCHLORIDE 104.0 mmol/LCO2 25.0 mmol/LBUN 21.0 mg/dLCREATININE 0.90 mg/dLSGOT/
AST 19.0 IU/LSGPT/ALT 26.0 IU/LALK PHOS 99.0 IU/LTOTAL PROTEIN 7.10 g/dLALBUMIN 
4.20 g/dLTOTAL BILI 0.50 mg/dLCALCIUM 10.80 mg/dLAGE 57 GFR NonAA 65 GFR AA 79 
eGFR 60 eGFR AA* 60 TRIGLYCERIDES 210.0 mg/dLCHOLESTEROL 241.0 mg/dLHDL 48.0 mg/
dLTOT CHOL/HDL 5.0 LDL (CALC) 151.0 mg/dLGLYCOHEMOGLOBIN A1C 6.30 %

 

 2012 8:54 AM  GLUCOSE 125.0 mg/dL

 

 12/3/2013 8:58 AM  TRIGLYCERIDES 210.0 mg/dLCHOLESTEROL 211.0 mg/dLHDL 49.0 mg/
dLTOT CHOL/HDL 4.3 LDL (CALC) 120.0 mg/dLGLUCOSE 125.0 mg/dLSODIUM 140.0 mmol/
LPOTASSIUM 4.20 mmol/LCHLORIDE 105.0 mmol/LCO2 25.0 mmol/LBUN 24.0 mg/
dLCREATININE 0.90 mg/dLSGOT/AST 23.0 IU/LSGPT/ALT 28.0 IU/LALK PHOS 111.0 IU/
LTOTAL PROTEIN 7.0 g/dLALBUMIN 4.30 g/dLTOTAL BILI 0.60 mg/dLCALCIUM 10.60 mg/
dLAGE 58 GFR NonAA 64 GFR AA 78 eGFR >60 mL/min/1.73 m2eGFR AA* >60 HGB A1C 
6.90 %Est Avg Glucose 151.3 mg/dL

 

 2014 9:05 AM  TRIGLYCERIDES 210.0 mg/dLCHOLESTEROL 204.0 mg/dLHDL 51.0 mg/
dLTOT CHOL/HDL 4.0 LDL (CALC) 111.0 mg/dLGLUCOSE 120.0 mg/dLSODIUM 141.0 mmol/
LPOTASSIUM 4.40 mmol/LCHLORIDE 103.0 mmol/LCO2 25.0 mmol/LBUN 18.0 mg/
dLCREATININE 0.90 mg/dLSGOT/AST 24.0 IU/LSGPT/ALT 29.0 IU/LALK PHOS 98.0 IU/
LTOTAL PROTEIN 7.0 g/dLALBUMIN 4.10 g/dLTOTAL BILI 0.60 mg/dLCALCIUM 10.20 mg/
dLAGE 59 GFR NonAA 64 GFR AA 78 eGFR 60 eGFR AA* 60 HGB A1C 6.80 %Est Avg 
Glucose 148.5 mg/dL

 

 10/24/2016 10:22 AM  HGB A1C 6.50 %Est Avg Glucose 139.9 mg/dLGLUCOSE 129.0 mg/
dLSODIUM 143.0 mmol/LPOTASSIUM 4.40 mmol/LCHLORIDE 104.0 mmol/LCO2 24.0 mmol/
LBUN 25.0 mg/dLCREATININE 0.90 mg/dLSGOT/AST 25.0 IU/LSGPT/ALT 35.0 IU/LALK 
PHOS 96.0 IU/LTOTAL PROTEIN 7.10 g/dLALBUMIN 4.20 g/dLTOTAL BILI 0.50 mg/
dLCALCIUM 10.30 mg/dLAGE 61 GFR NonAA 64 GFR AA 78 eGFR >60 mL/min/1.73meGFR 
AA* >60 TRIGLYCERIDES 181.0 mg/dLCHOLESTEROL 201.0 mg/dLHDL 47.0 mg/dLTOT CHOL/
HDL 4.3 LDL (CALC) 118.0 mg/dLMICROALBUMIN UR <0.5 ug/mLVITAMIN B12 485.0 pg/mL







History Of Immunizations







 Name  Date Admin  Mfg Name  Mfg Code  Trade Name  Lot#  Route  Inj  Vis Given  
Vis Pub  CVX

 

 Tdap  2012  [employer]  SKB  ADACEL  [id number]  Intramuscular  Right 
Deltoid  2008  115

 

 Influenza  2015  Not Entered  NE  Afluria     Not Entered  Not Entered  1  141

 

 Influenza  2016  Not Entered  NE  Afluria     Not Entered  Not Entered  1  141







History of Past Illness







 Name  Date of Onset  Comments

 

 Hypertension  Dec  7 2009  1:45PM   

 

 Glucose Intolerance  Dec  7 2009  1:45PM   

 

 Hyperlipidemia  Dec  7 2009  1:45PM   

 

 Screening Mammogram  Dec  7 2009  1:45PM   

 

 Routine gynecological examination  Dec 21 2009  8:25AM   

 

 Hypertension Stable  Dec 21 2009  8:25AM   

 

 Hyperlipidemia, unspecified Stable  Dec 21 2009  8:25AM   

 

 Arthritis unspecified      

 

 Hyperlipidemia      

 

 Hypertension      

 

 Hypertension  Dec 17 2010  1:41PM   

 

 Glucose Intolerance  Dec 17 2010  1:41PM   

 

 Hyperlipidemia  Dec 17 2010  1:41PM   

 

 Hyperglycemia  Dec 17 2010  1:41PM   

 

 Routine gynecological examination  Dec 20 2010  1:52PM   

 

 Overactive bladder  Dec 20 2010  1:52PM   

 

 Hypertension  2011 12:05PM   

 

 Glucose Intolerance  2011 12:05PM   

 

 Hyperlipidemia  2011 12:05PM   

 

 Hyperglycemia  2011 12:05PM   

 

 Routine gynecological examination  Dec  8 2011  8:45AM   

 

 Overactive bladder  Dec  8 2011  8:45AM   

 

 Screening Examination for Breast Cancer  Dec  8 2011  8:45AM   

 

 Hyperglycemia  Dec  8 2011  8:45AM   

 

 Laceration 2nd digit right hand  May 21 2012 10:59AM   

 

 Acute Pharyngitis  Oct  8 2012  8:30AM   

 

 Hypertension  Dec 10 2012 11:59AM   

 

 Glucose Intolerance  Dec 10 2012 11:59AM   

 

 Hyperlipidemia  Dec 10 2012 11:59AM   

 

 Hyperglycemia  Dec 10 2012 11:59AM   

 

 Screening Examination for Breast Cancer  Dec 19 2012  8:02AM   

 

 Hypertension  Dec 19 2012  8:02AM   

 

 Hyperlipidemia, Mixed  Dec 19 2012  8:02AM   

 

 Hyperglycemia  Dec 19 2012  8:02AM   

 

 Upper Respiratory Infections  May 23 2013 10:32AM   

 

 Eczema  Aug 14 2013  9:49AM   

 

 Eustachian Tube Dysfunction, Bilateral  Aug 14 2013  9:49AM   

 

 Worried well  Sep 16 2013  9:56AM   

 

 Hypertension  Dec  2 2013 11:57AM   

 

 Glucose Intolerance  Dec  2 2013 11:57AM   

 

 Hyperlipidemia  Dec  2 2013 11:57AM   

 

 Hyperglycemia  Dec  2 2013 11:57AM   

 

 Routine gynecological examination  Dec 17 2013  8:06AM   

 

 Screening Examination for Breast Cancer  Dec 17 2013  8:06AM   

 

 Hypertension  2014 11:58AM   

 

 Glucose Intolerance  2014 11:58AM   

 

 Hyperlipidemia  2014 11:58AM   

 

 Hyperglycemia  2014 11:58AM   

 

 Essential Hypertension  2014 10:39AM   

 

 Diabetes Mellitus, Type II  2014 10:39AM   

 

 Hyperlipidemia, Mixed  2014 10:39AM   

 

 Sinusitis, Acute  Sep 14 2015  9:52AM   

 

 Bronchitis, Acute  Sep 14 2015  9:52AM   

 

 Arthritis unspecified  Oct 26 2015  8:37AM   

 

 Hypertension  Oct 20 2016  9:16AM   

 

 Diabetes Mellitus, Type II  Oct 20 2016  9:16AM   

 

 Hyperlipidemia  Oct 20 2016  9:16AM   

 

 Routine gynecological examination  2016  8:11AM   

 

 Screening Examination for Breast Cancer  2016  8:11AM   

 

 Visit for screening mammogram  2016  8:11AM   

 

 Hyperlipidemia  2016  8:11AM   

 

 Hypertension  2016  8:11AM   

 

 Controlled diabetes mellitus type II without complication  2016  8:11AM
   

 

 Pain in joint of right shoulder  2017  9:41AM   

 

 Acute bronchitis  Mar 27 2017  9:57AM   

 

 Acute non-recurrent maxillary sinusitis  Mar 27 2017  9:57AM   

 

 Hyperlipidemia  Oct 12 2017  8:08AM   

 

 Morbid Obesity  Oct 12 2017  8:08AM   

 

 Hypertension  Oct 12 2017  8:08AM   

 

 Hypertension  Mar 19 2018 10:08AM   

 

 Hyperlipemia  Mar 19 2018 10:08AM   

 

 Morbid obesity  Mar 19 2018 10:08AM   

 

 Family history of diabetes mellitus  Mar 19 2018 10:08AM   







Payers







 Insurance Name  Company Name  Plan Name  Plan Number  Policy Number  Policy 
Group Number  Start Date

 

    Medicare RHC Medicare RHC     [id number]     2008

 

    Medicare Part B  [employer] [place]     [id number]     2008

 

    Medicare Part A  Medicare Part A     [id number]     2008

 

    Medicare Part A  Medicare - Lab/Xray     [id number]     2008







History of Encounters







 Visit Date  Visit Type  Provider

 

 10/12/2017  Office visit  Brain Hauser DO

 

 3/27/2017  Office visit  Brain Hauser DO

 

 2017  Office visit  Brain Hauser DO

 

 2016  Office visit  Brain Hauser DO

 

 10/26/2015  Office visit  Brain Hauser DO

 

 2015  Office visit  Brain Hauser DO

 

 2014  Office visit  Brain Hauser DO

 

 2013  Office visit  Brain Murtaza DO

 

 2013  Office visit  Brain Shafferte DO

 

 2013  Office visit  Brain Hauser DO

 

 2013  Office visit  Margie RODAS

 

 2012  Office visit  Brain Hauser DO

 

 10/8/2012  Office visit  Brain Hauser DO

 

 2012  Office visit  Brain Hasuer DO

 

 2011  Office visit  Brain Hauser DO

 

 2010  Office visit  Brain Hauser DO

 

 2009  Office visit  Brain Shafferte DO

 

 2009  Office visit  Brain Hauser DO

## 2019-02-11 NOTE — XMS REPORT
MU2 Ambulatory Summary

 Created on: 2017



Elena Deluca

External Reference #: [id number]

: 1955

Sex: Female



Demographics







 Address  [address]

[place]  [address]

 

 Home Phone  [phone]

 

 Preferred Language  English

 

 Marital Status  

 

 Anabaptist Affiliation  Unknown

 

 Race  White

 

 Ethnic Group  Not  or 





Author







 Author  Brain Hauser

 

 [facility]

 

 Address  [address]

[place], [place]  [id number]



 

 Phone  [phone]







Care Team Providers







 Care Team Member Name  Role  Phone

 

 Brain Hauser  PCP  Unavailable

 

 Brain Hauser  PreferredProvider  Unavailable







Allergies and Adverse Reactions







 Name  Reaction  Notes

 

 NO KNOWN DRUG ALLERGIES      







Plan of Treatment

Not available.



Medications







 Active 

 

 Name  Start Date  Estimated Completion Date  SIG  Comments

 

 Calcium 600mg        one tablet daily   

 

 Multivitamin Oral Tablet        take 1 tablet by oral route daily   

 

 aspirin 81 mg oral tablet        take 1 tablet (81 mg) by oral route once 
daily   

 

 Fish Oil 1,000 mg oral capsule  2010     take 2 capsules by oral route 
daily   

 

 valsartan-hydrochlorothiazide 160-12.5 mg oral tablet  2014     TAKE ONE 
TABLET BY MOUTH EVERY DAY   

 

 simvastatin 20 mg oral tablet  2014     TAKE ONE TABLET BY MOUTH EVERY 
DAY IN THE EVENING   

 

 Osteo Bi-Flex 250-200 mg oral tablet        take 1 tablet by oral route daily 
  

 

 simvastatin 20 mg oral tablet  2014     TAKE ONE TABLET BY MOUTH EVERY 
DAY IN THE EVENING   

 

 valsartan-hydrochlorothiazide 160-12.5 mg oral tablet  2015     TAKE ONE 
TABLET BY MOUTH EVERY DAY   

 

 simvastatin 20 mg oral tablet  2015     TAKE ONE TABLET BY MOUTH ONCE 
DAILY AT BEDTIME   

 

 valsartan-hydrochlorothiazide 160-12.5 mg oral tablet  2015     TAKE ONE 
TABLET BY MOUTH ONCE DAILY   

 

 valsartan-hydrochlorothiazide 160-12.5 mg oral tablet  8/3/2015     TAKE ONE 
TABLET BY MOUTH ONCE DAILY   

 

 simvastatin 20 mg oral tablet  2015     TAKE ONE TABLET BY MOUTH ONCE 
DAILY AT BEDTIME   

 

 amlodipine 10 mg oral tablet  10/18/2015     TAKE ONE TABLET BY MOUTH ONCE 
DAILY   

 

 valsartan-hydrochlorothiazide 160-12.5 mg oral tablet  2016     TAKE ONE 
TABLET BY MOUTH ONCE DAILY   

 

 simvastatin 20 mg oral tablet  2016     TAKE ONE TABLET BY MOUTH ONCE 
DAILY AT BEDTIME   

 

 valsartan-hydrochlorothiazide 160-12.5 mg oral tablet  2016     TAKE ONE 
TABLET BY MOUTH ONCE DAILY   

 

 simvastatin 20 mg oral tablet  2016     TAKE ONE TABLET BY MOUTH ONCE 
DAILY AT BEDTIME   

 

 amlodipine 10 mg oral tablet  10/9/2016     TAKE ONE TABLET BY MOUTH ONCE 
DAILY   

 

 simvastatin 20 mg oral tablet  2016     TAKE ONE TABLET BY MOUTH ONCE 
DAILY AT BEDTIME   

 

 amlodipine 10 mg oral tablet  2017     TAKE ONE TABLET BY MOUTH ONCE DAILY
   

 

 cyclobenzaprine 10 mg oral tablet  2017     take 1 tablet (10 mg) by oral 
route at bedtime   









  

 

 Name  Start Date  Expiration Date  SIG  Comments

 

 Augmentin 500-125 mg oral tablet  10/8/2012  10/15/2012  take 1 tablet by oral 
route every 12 hours for 7 days   

 

 Diflucan 100 mg oral tablet  10/8/2012  10/11/2012  take 1 tablet (100 mg) by 
oral route once daily   

 

 amoxicillin 500 mg oral capsule  2013  take 1 capsule (500 mg) 
by oral route 3 times per day for 10 days   

 

 simvastatin 20 mg oral tablet  9/10/2013  10/10/2013  TAKE ONE TABLET BY MOUTH 
EVERY DAY IN THE EVENING   

 

 amlodipine 10 mg oral tablet  2014  TAKE ONE TABLET BY MOUTH 
EVERY DAY   

 

 Augmentin 500-125 mg oral tablet  2015  take 1 tablet by oral 
route every 12 hours for 7 days   









 Discontinued 

 

 Name  Start Date  Discontinued Date  SIG  Comments

 

 niacin 500 mg oral tablet extended release 24 hr     2011  take 1 tablets 
(500 mg) by oral route once daily  Starting on Simvastatin

 

 Vesicare 5 mg oral tablet  2011  take 1 tablet by oral route 
daily for 30 days   

 

 Osteo Bi-Flex (5-Loxin) 1,500-400-100 mg-unit-mg oral tablet     2013  
take 1 tablet by oral route daily   

 

 Diovan -12.5 mg oral tablet  2012  TAKE ONE TABLET BY 
MOUTH EVERY DAY  generic on list

 

 Medrol (Lei) 4 mg oral tablets,dose pack  2013  take as 
directed   

 

 nystatin-triamcinolone 100,000-0.1 unit/g-% topical cream  2013  apply to affected area(s) by topical route 2 times a day   

 

 fexofenadine 180 mg oral tablet  2013  take 1 tablet (180 mg) 
by oral route once daily   

 

 prednisone 20 mg oral tablet  10/26/2015  2016  take 3 tablets (60 mg) by 
oral route once daily for 3 days then 2 tablets (40 mg) daily for 2 days   

 

 cyclobenzaprine 10 mg oral tablet  10/30/2015  2016  take 1 tablet (10 mg
) by oral route 3 times per day   







Problem List







 Description  Status  Onset

 

 Arthritis unspecified  Active   

 

 Hyperlipidemia  Active   

 

 Hypertension  Active   







Vital Signs







 Date  Time  BP-Sys(mm[Hg]  BP-Edelmira(mm[Hg])  HR(bpm)  RR(rpm)  Temp  WT  HT  HC  
BMI  BSA  BMI Percentile  O2 Sat(%)

 

 2017  9:36:00 AM  132 mmHg  72 mmHg  76 bpm  20 rpm  97.2 F  396.25 lbs  
64 in     68.02 kg/m2  2.85 m2     97 %

 

 2016  7:58:00 AM  136 mmHg  70 mmHg  85 bpm  20 rpm  98.1 F  391 lbs  64 
in     67.1143 kg/m  2.8299 m     95 %

 

 10/26/2015  8:35:00 AM  124 mmHg  70 mmHg  80 bpm  18 rpm  97.1 F     64 in   
            

 

 2015  9:50:00 AM  132 mmHg  88 mmHg  90 bpm  22 rpm  98.5 F  387 lbs  64 
in     66.4277 kg/m  2.8154 m     97 %

 

 2014  10:36:00 AM  142 mmHg  80 mmHg  78 bpm  22 rpm  97.9 F  381 lbs  64 
in     65.40 kg/m2  2.79 m2     97 %

 

 2013  8:04:00 AM  138 mmHg  70 mmHg  88 bpm  22 rpm  98.1 F  388 lbs  64 
in     66.5994 kg/m  2.8191 m     98 %

 

 2013  9:54:00 AM  136 mmHg  70 mmHg  88 bpm  24 rpm  97.1 F  386 lbs  64 
in     66.26 kg/m2  2.81 m2     97 %

 

 2013  9:47:00 AM  134 mmHg  78 mmHg  70 bpm  16 rpm  97.8 F  381 lbs  64 
in     65.3978 kg/m  2.7935 m      

 

 2013  10:27:00 AM  136 mmHg  84 mmHg  82 bpm  22 rpm  98.2 F  384.2 lbs  
64 in     65.95 kg/m2  2.81 m2     96 %

 

 2012  7:59:00 AM  138 mmHg  72 mmHg  70 bpm  18 rpm  98 F  377 lbs  64 
in     64.7112 kg/m  2.7788 m      

 

 10/8/2012  8:29:00 AM  138 mmHg  70 mmHg  72 bpm  18 rpm  98.1 F  371 lbs  64 
in     63.68 kg/m2  2.76 m2      

 

 2012  10:57:00 AM  136 mmHg  72 mmHg  68 bpm  18 rpm  98 F  371 lbs  64 
in     63.6814 kg/m  2.7566 m      

 

 2011  8:43:00 AM  144 mmHg  78 mmHg  72 bpm  18 rpm  98.2 F  396 lbs  64 
in     67.97 kg/m2  2.85 m2      

 

 2010  1:51:00 PM  142 mmHg  80 mmHg  76 bpm  16 rpm  98.7 F  382 lbs  64 
in     65.5695 kg/m  2.7972 m      

 

 2009  8:23:00 AM  132 mmHg  78 mmHg  70 bpm  24 rpm  98.3 F  377 lbs  64 
in     64.71 kg/m2  2.78 m2      







Social History







 Name  Description  Comments

 

 Tobacco  Former smoker  for 2 years

 

       







History of Procedures







 Date Ordered  Description  Order Status

 

 10/26/2015 12:00 AM  X-RAY EXAM OF HIP  Reviewed

 

 10/26/2015 12:00 AM  X-RAY EXAM OF KNEE 3  Reviewed

 

 2011 12:00 AM  COMPREHEN METABOLIC PANEL  Reviewed

 

 2011 12:00 AM  LIPID PANEL  Reviewed

 

 2011 12:00 AM  GLYCOSYLATED HEMOGLOBIN TEST  Reviewed

 

 2011 12:00 AM  CYTOPATH C/V THIN LAYER  Reviewed

 

 2011 12:00 AM  MAMMOGRAM SCREENING  Reviewed

 

 2012 12:00 AM  ASSAY GLUCOSE BLOOD QUANT  Reviewed

 

 10/20/2016 12:00 AM  COMPREHEN METABOLIC PANEL  Reviewed

 

 10/20/2016 12:00 AM  LIPID PANEL  Reviewed

 

 10/20/2016 12:00 AM  GLYCOSYLATED HEMOGLOBIN TEST  Reviewed

 

 10/20/2016 12:00 AM  MICROALBUMIN QUANTITATIVE  Reviewed

 

 10/20/2016 12:00 AM  VITAMIN B-12  Reviewed

 

 2017 12:00 AM  RADEX SHOULDER COMPLETE MINIMUM 2 VIEWS  Returned

 

 2017 12:00 AM  RADEX HUMERUS MINIMUM 2 VIEWS  Returned

 

 2017 12:00 AM  Decadron 8mg Injection  Reviewed

 

 2017 12:00 AM  Depo-Medrol 80mg Injection  Reviewed

 

 2012 12:00 AM  TDAP VACCINE 7 YRS/> IM  Reviewed

 

 2009 12:00 AM  COMPREHEN METABOLIC PANEL  Reviewed

 

 2009 12:00 AM  LIPID PANEL  Reviewed

 

 2009 12:00 AM  GLYCOSYLATED HEMOGLOBIN TEST  Reviewed

 

 2009 12:00 AM  MAMMOGRAM SCREENING  Reviewed

 

 12/10/2012 12:00 AM  COMPREHEN METABOLIC PANEL  Reviewed

 

 12/10/2012 12:00 AM  LIPID PANEL  Reviewed

 

 12/10/2012 12:00 AM  GLYCOSYLATED HEMOGLOBIN TEST  Reviewed

 

 2012 12:00 AM  MAMMOGRAM SCREENING  Reviewed

 

 2012 12:00 AM  ASSAY GLUCOSE BLOOD QUANT  Reviewed

 

 2013 12:00 AM  COMPREHEN METABOLIC PANEL  Reviewed

 

 2013 12:00 AM  LIPID PANEL  Reviewed

 

 2013 12:00 AM  GLYCOSYLATED HEMOGLOBIN TEST  Reviewed

 

 2013 12:00 AM  CYTOPATH C/V THIN LAYER  Reviewed

 

 2013 12:00 AM  MAMMOGRAM SCREENING  Reviewed

 

 2009 12:00 AM  CYTOPATH C/V THIN LAYER  Reviewed

 

 2010 12:00 AM  COMPREHEN METABOLIC PANEL  Reviewed

 

 2010 12:00 AM  LIPID PANEL  Reviewed

 

 2010 12:00 AM  GLYCOSYLATED HEMOGLOBIN TEST  Reviewed

 

 2010 12:00 AM  CYTOPATH C/V THIN LAYER  Reviewed

 

 2010 12:00 AM  MAMMOGRAM SCREENING  Reviewed

 

 2014 12:00 AM  COMPREHEN METABOLIC PANEL  Reviewed

 

 2014 12:00 AM  LIPID PANEL  Reviewed

 

 2014 12:00 AM  GLYCOSYLATED HEMOGLOBIN TEST  Reviewed







Results Summary







 Data and Description  Results

 

 2009 10:01 AM  .0 mg/dLCHOLESTEROL 258 mg/dLTRIGLYCERIDES 252.0 mg
/dLHDL 51.0 mg/dLTOT CHOL/HDL 5.1 .0 mg/dLTRIGLYCERIDES 252.0 mg/dLTOT 
CHOL/HDL 5.1 HDL 51.0 mg/dLCHOLESTEROL 258 mg/dLGLYCOHEMOGLOBIN A1C 6.3 
GLYCOHEMOGLOBIN A1C 6.3 GLUCOSE 116 SODIUM 144.0 mmol/LPOTASSIUM 4.80 mmol/
LCHLORIDE 105.0 mmol/LCO2 24.0 mmol/LBUN 18.0 mg/dLCREATININE 0.80 mg/dLSGOT/
AST 23.0 IU/LSGPT/ALT 26.0 IU/LALK PHOS 98.0 IU/LTOTAL PROTEIN 7.30 g/dLALBUMIN 
4.20 g/dLTOTAL BILI 0.40 mg/dLCALCIUM 10.30 mg/dLAGE 54 GFR NonAA 75 GFR AA 91 
eGFR >60 mL/mineGFR AA* >60 GLUCOSE 116 SODIUM 144.0 mmol/LPOTASSIUM 4.80 mmol/
LCHLORIDE 105.0 mmol/LCO2 24.0 mmol/LBUN 18.0 mg/dLCREATININE 0.80 mg/dLSGOT/
AST 23.0 IU/LSGPT/ALT 26.0 IU/LALK PHOS 98.0 IU/LTOTAL PROTEIN 7.30 g/dLALBUMIN 
4.20 g/dLTOTAL BILI 0.40 mg/dLCALCIUM 10.30 mg/dLAGE 54 GFR NonAA 75 GFR AA 91 
eGFR >60 mL/mineGFR AA* >60 

 

 2011 9:00 AM  GLUCOSE 130.0 mg/dLSODIUM 139.0 mmol/LPOTASSIUM 4.40 mmol/
LCHLORIDE 104.0 mmol/LCO2 26.0 mmol/LBUN 21.0 mg/dLCREATININE 0.80 mg/dLSGOT/
AST 19.0 IU/LSGPT/ALT 25.0 IU/LALK PHOS 108.0 IU/LTOTAL PROTEIN 7.10 g/
dLALBUMIN 4.20 g/dLTOTAL BILI 0.40 mg/dLCALCIUM 10.20 mg/dLAGE 56 GFR NonAA 74 
GFR AA 90 eGFR >60 mL/min/1.73 m2eGFR AA* >60 TRIGLYCERIDES 227.0 mg/
dLCHOLESTEROL 282.0 mg/dLHDL 45.0 mg/dLTOT CHOL/HDL 6.3 LDL (CALC) 192.0 mg/
dLGLYCOHEMOGLOBIN A1C 6.10 %

 

 2012 9:42 AM  GLUCOSE 131.0 mg/dL

 

 2012 10:02 AM  GLUCOSE 150.0 mg/dLSODIUM 140.0 mmol/LPOTASSIUM 4.60 mmol/
LCHLORIDE 104.0 mmol/LCO2 25.0 mmol/LBUN 21.0 mg/dLCREATININE 0.90 mg/dLSGOT/
AST 19.0 IU/LSGPT/ALT 26.0 IU/LALK PHOS 99.0 IU/LTOTAL PROTEIN 7.10 g/dLALBUMIN 
4.20 g/dLTOTAL BILI 0.50 mg/dLCALCIUM 10.80 mg/dLAGE 57 GFR NonAA 65 GFR AA 79 
eGFR 60 eGFR AA* 60 TRIGLYCERIDES 210.0 mg/dLCHOLESTEROL 241.0 mg/dLHDL 48.0 mg/
dLTOT CHOL/HDL 5.0 LDL (CALC) 151.0 mg/dLGLYCOHEMOGLOBIN A1C 6.30 %

 

 2012 8:54 AM  GLUCOSE 125.0 mg/dL

 

 12/3/2013 8:58 AM  TRIGLYCERIDES 210.0 mg/dLCHOLESTEROL 211.0 mg/dLHDL 49.0 mg/
dLTOT CHOL/HDL 4.3 LDL (CALC) 120.0 mg/dLGLUCOSE 125.0 mg/dLSODIUM 140.0 mmol/
LPOTASSIUM 4.20 mmol/LCHLORIDE 105.0 mmol/LCO2 25.0 mmol/LBUN 24.0 mg/
dLCREATININE 0.90 mg/dLSGOT/AST 23.0 IU/LSGPT/ALT 28.0 IU/LALK PHOS 111.0 IU/
LTOTAL PROTEIN 7.0 g/dLALBUMIN 4.30 g/dLTOTAL BILI 0.60 mg/dLCALCIUM 10.60 mg/
dLAGE 58 GFR NonAA 64 GFR AA 78 eGFR >60 mL/min/1.73 m2eGFR AA* >60 HGB A1C 
6.90 %Est Avg Glucose 151.3 mg/dL

 

 2014 9:05 AM  TRIGLYCERIDES 210.0 mg/dLCHOLESTEROL 204.0 mg/dLHDL 51.0 mg/
dLTOT CHOL/HDL 4.0 LDL (CALC) 111.0 mg/dLGLUCOSE 120.0 mg/dLSODIUM 141.0 mmol/
LPOTASSIUM 4.40 mmol/LCHLORIDE 103.0 mmol/LCO2 25.0 mmol/LBUN 18.0 mg/
dLCREATININE 0.90 mg/dLSGOT/AST 24.0 IU/LSGPT/ALT 29.0 IU/LALK PHOS 98.0 IU/
LTOTAL PROTEIN 7.0 g/dLALBUMIN 4.10 g/dLTOTAL BILI 0.60 mg/dLCALCIUM 10.20 mg/
dLAGE 59 GFR NonAA 64 GFR AA 78 eGFR 60 eGFR AA* 60 HGB A1C 6.80 %Est Avg 
Glucose 148.5 mg/dL

 

 10/24/2016 10:22 AM  HGB A1C 6.50 %Est Avg Glucose 139.9 mg/dLGLUCOSE 129.0 mg/
dLSODIUM 143.0 mmol/LPOTASSIUM 4.40 mmol/LCHLORIDE 104.0 mmol/LCO2 24.0 mmol/
LBUN 25.0 mg/dLCREATININE 0.90 mg/dLSGOT/AST 25.0 IU/LSGPT/ALT 35.0 IU/LALK 
PHOS 96.0 IU/LTOTAL PROTEIN 7.10 g/dLALBUMIN 4.20 g/dLTOTAL BILI 0.50 mg/
dLCALCIUM 10.30 mg/dLAGE 61 GFR NonAA 64 GFR AA 78 eGFR >60 mL/min/1.73meGFR 
AA* >60 TRIGLYCERIDES 181.0 mg/dLCHOLESTEROL 201.0 mg/dLHDL 47.0 mg/dLTOT CHOL/
HDL 4.3 LDL (CALC) 118.0 mg/dLMICROALBUMIN UR <0.5 ug/mLVITAMIN B12 485.0 pg/mL







History Of Immunizations







 Name  Date Admin  Mfg Name  Mfg Code  Trade Name  Lot#  Route  Inj  Vis Given  
Vis Pub  CVX

 

 Tdap  2012  [employer]  SKB  ADACEL  [id number]  Intramuscular  Right 
Deltoid  2008  115

 

 Influenza  2015  Not Entered  NE  Afluria     Not Entered  Not Entered  1  141

 

 Influenza  2016  Not Entered  NE  Afluria     Not Entered  Not Entered  1  141







History of Past Illness







 Name  Date of Onset  Comments

 

 Hypertension  Dec  7 2009  1:45PM   

 

 Glucose Intolerance  Dec  7 2009  1:45PM   

 

 Hyperlipidemia  Dec  7 2009  1:45PM   

 

 Screening Mammogram  Dec  7 2009  1:45PM   

 

 Routine gynecological examination  Dec 21 2009  8:25AM   

 

 Hypertension Stable  Dec 21 2009  8:25AM   

 

 Hyperlipidemia, unspecified Stable  Dec 21 2009  8:25AM   

 

 Arthritis unspecified      

 

 Hyperlipidemia      

 

 Hypertension      

 

 Hypertension  Dec 17 2010  1:41PM   

 

 Glucose Intolerance  Dec 17 2010  1:41PM   

 

 Hyperlipidemia  Dec 17 2010  1:41PM   

 

 Hyperglycemia  Dec 17 2010  1:41PM   

 

 Routine gynecological examination  Dec 20 2010  1:52PM   

 

 Overactive bladder  Dec 20 2010  1:52PM   

 

 Hypertension  2011 12:05PM   

 

 Glucose Intolerance  2011 12:05PM   

 

 Hyperlipidemia  2011 12:05PM   

 

 Hyperglycemia  2011 12:05PM   

 

 Routine gynecological examination  Dec  8 2011  8:45AM   

 

 Overactive bladder  Dec  8 2011  8:45AM   

 

 Screening Examination for Breast Cancer  Dec  8 2011  8:45AM   

 

 Hyperglycemia  Dec  8 2011  8:45AM   

 

 Laceration 2nd digit right hand  May 21 2012 10:59AM   

 

 Acute Pharyngitis  Oct  8 2012  8:30AM   

 

 Hypertension  Dec 10 2012 11:59AM   

 

 Glucose Intolerance  Dec 10 2012 11:59AM   

 

 Hyperlipidemia  Dec 10 2012 11:59AM   

 

 Hyperglycemia  Dec 10 2012 11:59AM   

 

 Screening Examination for Breast Cancer  Dec 19 2012  8:02AM   

 

 Hypertension  Dec 19 2012  8:02AM   

 

 Hyperlipidemia, Mixed  Dec 19 2012  8:02AM   

 

 Hyperglycemia  Dec 19 2012  8:02AM   

 

 Upper Respiratory Infections  May 23 2013 10:32AM   

 

 Eczema  Aug 14 2013  9:49AM   

 

 Eustachian Tube Dysfunction, Bilateral  Aug 14 2013  9:49AM   

 

 Worried well  Sep 16 2013  9:56AM   

 

 Hypertension  Dec  2 2013 11:57AM   

 

 Glucose Intolerance  Dec  2 2013 11:57AM   

 

 Hyperlipidemia  Dec  2 2013 11:57AM   

 

 Hyperglycemia  Dec  2 2013 11:57AM   

 

 Routine gynecological examination  Dec 17 2013  8:06AM   

 

 Screening Examination for Breast Cancer  Dec 17 2013  8:06AM   

 

 Hypertension  2014 11:58AM   

 

 Glucose Intolerance  2014 11:58AM   

 

 Hyperlipidemia  2014 11:58AM   

 

 Hyperglycemia  2014 11:58AM   

 

 Essential Hypertension  2014 10:39AM   

 

 Diabetes Mellitus, Type II  2014 10:39AM   

 

 Hyperlipidemia, Mixed  2014 10:39AM   

 

 Sinusitis, Acute  Sep 14 2015  9:52AM   

 

 Bronchitis, Acute  Sep 14 2015  9:52AM   

 

 Arthritis unspecified  Oct 26 2015  8:37AM   

 

 Hypertension  Oct 20 2016  9:16AM   

 

 Diabetes Mellitus, Type II  Oct 20 2016  9:16AM   

 

 Hyperlipidemia  Oct 20 2016  9:16AM   

 

 Routine gynecological examination  2016  8:11AM   

 

 Screening Examination for Breast Cancer  2016  8:11AM   

 

 Visit for screening mammogram  2016  8:11AM   

 

 Hyperlipidemia  2016  8:11AM   

 

 Hypertension  2016  8:11AM   

 

 Controlled diabetes mellitus type II without complication  2016  8:11AM
   

 

 Pain in joint of right shoulder  2017  9:41AM   







Payers







 Insurance Name  Company Name  Plan Name  Plan Number  Policy Number  Policy 
Group Number  Start Date

 

    Medicare RHC  Medicare RHC     [id number]     N/A

 

    Medicare Part B  Medicare [employer] [place]     [id number]     2008

 

    Medicare Part A  Medicare Part A     [id number]     2008

 

    Medicare Part A  Medicare - Lab/Xray     [id number]     2008







History of Encounters







 Visit Date  Visit Type  Provider

 

 2017  Office visit  Brain Hauser DO

 

 2016  Office visit  Brain Hauser DO

 

 10/26/2015  Office visit  Brain Shafferte DO

 

 2015  Office visit  Brain Hauser DO

 

 2014  Office visit  Brain Hauser DO

 

 2013  Office visit  Brain Shafferte DO

 

 2013  Office visit  Brain Hauser DO

 

 2013  Office visit  Brain Hauser DO

 

 2013  Office visit  Margie RODAS

 

 2012  Office visit  Brain Hauser DO

 

 10/8/2012  Office visit  Brain Shafferte DO

 

 2012  Office visit  Brain Hauser DO

 

 2011  Office visit  Brain Hauser DO

 

 2010  Office visit  Brain Hauser DO

 

 2009  Office visit  Brain Shafferte DO

 

 2009  Office visit  Brain Hauser DO

## 2019-08-18 ENCOUNTER — HOSPITAL ENCOUNTER (OUTPATIENT)
Dept: HOSPITAL 75 - LAB | Age: 64
End: 2019-08-18
Attending: NURSE PRACTITIONER
Payer: MEDICARE

## 2019-08-18 DIAGNOSIS — R60.9: ICD-10-CM

## 2019-08-18 DIAGNOSIS — I10: ICD-10-CM

## 2019-08-18 DIAGNOSIS — E78.5: Primary | ICD-10-CM

## 2019-08-18 LAB
ALBUMIN SERPL-MCNC: 4 GM/DL (ref 3.2–4.5)
ALP SERPL-CCNC: 108 U/L (ref 40–136)
ALT SERPL-CCNC: 17 U/L (ref 0–55)
BASOPHILS # BLD AUTO: 0 10^3/UL (ref 0–0.1)
BASOPHILS NFR BLD AUTO: 0 % (ref 0–10)
BILIRUB SERPL-MCNC: 0.5 MG/DL (ref 0.1–1)
BUN/CREAT SERPL: 32
CALCIUM SERPL-MCNC: 10.7 MG/DL (ref 8.5–10.1)
CHLORIDE SERPL-SCNC: 104 MMOL/L (ref 98–107)
CHOLEST SERPL-MCNC: 191 MG/DL (ref ?–200)
CO2 SERPL-SCNC: 24 MMOL/L (ref 21–32)
CREAT SERPL-MCNC: 1.11 MG/DL (ref 0.6–1.3)
EOSINOPHIL # BLD AUTO: 0.3 10^3/UL (ref 0–0.3)
EOSINOPHIL NFR BLD AUTO: 4 % (ref 0–10)
ERYTHROCYTE [DISTWIDTH] IN BLOOD BY AUTOMATED COUNT: 13.2 % (ref 10–14.5)
GFR SERPLBLD BASED ON 1.73 SQ M-ARVRAT: 49 ML/MIN
GLUCOSE SERPL-MCNC: 134 MG/DL (ref 70–105)
HCT VFR BLD CALC: 38 % (ref 35–52)
HDLC SERPL-MCNC: 52 MG/DL (ref 40–60)
HGB BLD-MCNC: 12.8 G/DL (ref 11.5–16)
LYMPHOCYTES # BLD AUTO: 2.1 X 10^3 (ref 1–4)
LYMPHOCYTES NFR BLD AUTO: 27 % (ref 12–44)
MANUAL DIFFERENTIAL PERFORMED BLD QL: NO
MCH RBC QN AUTO: 30 PG (ref 25–34)
MCHC RBC AUTO-ENTMCNC: 33 G/DL (ref 32–36)
MCV RBC AUTO: 91 FL (ref 80–99)
MONOCYTES # BLD AUTO: 0.7 X 10^3 (ref 0–1)
MONOCYTES NFR BLD AUTO: 9 % (ref 0–12)
NEUTROPHILS # BLD AUTO: 4.8 X 10^3 (ref 1.8–7.8)
NEUTROPHILS NFR BLD AUTO: 61 % (ref 42–75)
PLATELET # BLD: 273 10^3/UL (ref 130–400)
PMV BLD AUTO: 10.1 FL (ref 7.4–10.4)
POTASSIUM SERPL-SCNC: 4.3 MMOL/L (ref 3.6–5)
PROT SERPL-MCNC: 7.2 GM/DL (ref 6.4–8.2)
SODIUM SERPL-SCNC: 140 MMOL/L (ref 135–145)
TRIGL SERPL-MCNC: 165 MG/DL (ref ?–150)
VLDLC SERPL CALC-MCNC: 33 MG/DL (ref 5–40)
WBC # BLD AUTO: 7.9 10^3/UL (ref 4.3–11)

## 2019-08-18 PROCEDURE — 83036 HEMOGLOBIN GLYCOSYLATED A1C: CPT

## 2019-08-18 PROCEDURE — 84443 ASSAY THYROID STIM HORMONE: CPT

## 2019-08-18 PROCEDURE — 36415 COLL VENOUS BLD VENIPUNCTURE: CPT

## 2019-08-18 PROCEDURE — 80053 COMPREHEN METABOLIC PANEL: CPT

## 2019-08-18 PROCEDURE — 85025 COMPLETE CBC W/AUTO DIFF WBC: CPT

## 2019-08-18 PROCEDURE — 84439 ASSAY OF FREE THYROXINE: CPT

## 2019-08-18 PROCEDURE — 80061 LIPID PANEL: CPT

## 2019-08-18 PROCEDURE — 83970 ASSAY OF PARATHORMONE: CPT

## 2019-08-19 ENCOUNTER — HOSPITAL ENCOUNTER (OUTPATIENT)
Dept: HOSPITAL 75 - RAD | Age: 64
End: 2019-08-19
Attending: NURSE PRACTITIONER
Payer: MEDICARE

## 2019-08-19 DIAGNOSIS — R01.1: Primary | ICD-10-CM

## 2019-08-19 PROCEDURE — 93306 TTE W/DOPPLER COMPLETE: CPT

## 2019-09-25 ENCOUNTER — HOSPITAL ENCOUNTER (OUTPATIENT)
Dept: HOSPITAL 75 - CARD | Age: 64
End: 2019-09-25
Attending: INTERNAL MEDICINE
Payer: MEDICARE

## 2019-09-25 VITALS — SYSTOLIC BLOOD PRESSURE: 139 MMHG | DIASTOLIC BLOOD PRESSURE: 84 MMHG

## 2019-09-25 DIAGNOSIS — I25.89: Primary | ICD-10-CM

## 2019-09-25 DIAGNOSIS — E66.01: ICD-10-CM

## 2019-09-25 DIAGNOSIS — I11.9: ICD-10-CM

## 2019-09-25 PROCEDURE — 93017 CV STRESS TEST TRACING ONLY: CPT

## 2019-09-25 PROCEDURE — 78452 HT MUSCLE IMAGE SPECT MULT: CPT

## 2019-09-25 NOTE — STRESS TEST
DATE OF SERVICE:  09/25/2019



LEXISCAN MYOVIEW STRESS TEST REPORT



Baseline heart rate is 61, baseline blood pressure 170/90.  Baseline EKG is

sinus rhythm with no ischemic changes.



In summary, the patient was injected with 10.94 mCi of technetium-99 Myoview and

the resting images were obtained.  Then, the patient received 0.4 mg of Lexiscan

followed by 30.4 mCi of technetium-99 Myoview.  Throughout the test, there were

no EKG changes.



The resting and stress images were reviewed and compared in the short axis,

horizontal long axis, and vertical long axis views.  Review of the images showed

reversible ischemia involving the whole inferior wall and inferoseptum.  SSS is

14.  SDS 8, TID value 1.02.  On the gated images, the left ventricle appeared to

be normal size with normal contractility.  Calculated ejection fraction 57%.



CONCLUSION:

1.  The patient tolerated Lexiscan well.

2.  Reversible ischemia involving the whole inferior wall and inferolateral and

inferoseptum.

3.  Normal left ventricular size with normal contractility.  Calculated ejection

fraction _____%.





Job ID: [id number]

DocumentID: [id number]

Dictated Date:  09/25/2019 13:28:26

Transcription Date: 09/25/2019 19:41:35

Dictated By: STEVE LUTHER MD

## 2019-10-09 ENCOUNTER — HOSPITAL ENCOUNTER (OUTPATIENT)
Dept: HOSPITAL 75 - CATH | Age: 64
Discharge: HOME | End: 2019-10-09
Attending: INTERNAL MEDICINE
Payer: MEDICARE

## 2019-10-09 VITALS — DIASTOLIC BLOOD PRESSURE: 62 MMHG | SYSTOLIC BLOOD PRESSURE: 145 MMHG

## 2019-10-09 VITALS — DIASTOLIC BLOOD PRESSURE: 63 MMHG | SYSTOLIC BLOOD PRESSURE: 125 MMHG

## 2019-10-09 VITALS — DIASTOLIC BLOOD PRESSURE: 91 MMHG | SYSTOLIC BLOOD PRESSURE: 139 MMHG

## 2019-10-09 VITALS — DIASTOLIC BLOOD PRESSURE: 93 MMHG | SYSTOLIC BLOOD PRESSURE: 131 MMHG

## 2019-10-09 VITALS — HEIGHT: 62.99 IN | BODY MASS INDEX: 51.91 KG/M2 | WEIGHT: 293 LBS

## 2019-10-09 VITALS — SYSTOLIC BLOOD PRESSURE: 129 MMHG | DIASTOLIC BLOOD PRESSURE: 74 MMHG

## 2019-10-09 VITALS — SYSTOLIC BLOOD PRESSURE: 137 MMHG | DIASTOLIC BLOOD PRESSURE: 75 MMHG

## 2019-10-09 VITALS — SYSTOLIC BLOOD PRESSURE: 135 MMHG | DIASTOLIC BLOOD PRESSURE: 69 MMHG

## 2019-10-09 VITALS — DIASTOLIC BLOOD PRESSURE: 66 MMHG | SYSTOLIC BLOOD PRESSURE: 139 MMHG

## 2019-10-09 VITALS — SYSTOLIC BLOOD PRESSURE: 145 MMHG | DIASTOLIC BLOOD PRESSURE: 62 MMHG

## 2019-10-09 VITALS — DIASTOLIC BLOOD PRESSURE: 59 MMHG | SYSTOLIC BLOOD PRESSURE: 132 MMHG

## 2019-10-09 DIAGNOSIS — I11.9: ICD-10-CM

## 2019-10-09 DIAGNOSIS — Z87.891: ICD-10-CM

## 2019-10-09 DIAGNOSIS — I65.23: ICD-10-CM

## 2019-10-09 DIAGNOSIS — E66.9: ICD-10-CM

## 2019-10-09 DIAGNOSIS — I27.20: ICD-10-CM

## 2019-10-09 DIAGNOSIS — I25.10: Primary | ICD-10-CM

## 2019-10-09 DIAGNOSIS — E78.5: ICD-10-CM

## 2019-10-09 DIAGNOSIS — Z79.899: ICD-10-CM

## 2019-10-09 DIAGNOSIS — R01.1: ICD-10-CM

## 2019-10-09 DIAGNOSIS — Z79.82: ICD-10-CM

## 2019-10-09 LAB
ALBUMIN SERPL-MCNC: 4.5 GM/DL (ref 3.2–4.5)
ALP SERPL-CCNC: 108 U/L (ref 40–136)
ALT SERPL-CCNC: 26 U/L (ref 0–55)
APTT BLD: 30 SEC (ref 24–35)
APTT PPP: YELLOW S
BACTERIA #/AREA URNS HPF: NEGATIVE /HPF
BILIRUB SERPL-MCNC: 0.6 MG/DL (ref 0.1–1)
BILIRUB UR QL STRIP: NEGATIVE
BUN/CREAT SERPL: 30
CALCIUM SERPL-MCNC: 10.8 MG/DL (ref 8.5–10.1)
CHLORIDE SERPL-SCNC: 107 MMOL/L (ref 98–107)
CHOLEST SERPL-MCNC: 200 MG/DL (ref ?–200)
CO2 SERPL-SCNC: 25 MMOL/L (ref 21–32)
CREAT SERPL-MCNC: 1.07 MG/DL (ref 0.6–1.3)
ERYTHROCYTE [DISTWIDTH] IN BLOOD BY AUTOMATED COUNT: 13.1 % (ref 10–14.5)
FIBRINOGEN PPP-MCNC: CLEAR MG/DL
GFR SERPLBLD BASED ON 1.73 SQ M-ARVRAT: 52 ML/MIN
GLUCOSE SERPL-MCNC: 117 MG/DL (ref 70–105)
GLUCOSE UR STRIP-MCNC: NEGATIVE MG/DL
HCT VFR BLD CALC: 40 % (ref 35–52)
HDLC SERPL-MCNC: 58 MG/DL (ref 40–60)
HGB BLD-MCNC: 13.4 G/DL (ref 11.5–16)
INR PPP: 1 (ref 0.8–1.4)
KETONES UR QL STRIP: NEGATIVE
LEUKOCYTE ESTERASE UR QL STRIP: NEGATIVE
MCH RBC QN AUTO: 31 PG (ref 25–34)
MCHC RBC AUTO-ENTMCNC: 33 G/DL (ref 32–36)
MCV RBC AUTO: 92 FL (ref 80–99)
NITRITE UR QL STRIP: NEGATIVE
PH UR STRIP: 6.5 [PH] (ref 5–9)
PLATELET # BLD: 278 10^3/UL (ref 130–400)
PMV BLD AUTO: 10.2 FL (ref 7.4–10.4)
POTASSIUM SERPL-SCNC: 4.3 MMOL/L (ref 3.6–5)
PROT SERPL-MCNC: 7.8 GM/DL (ref 6.4–8.2)
PROT UR QL STRIP: NEGATIVE
PROTHROMBIN TIME: 14 SEC (ref 12.2–14.7)
RBC #/AREA URNS HPF: (no result) /HPF
SODIUM SERPL-SCNC: 142 MMOL/L (ref 135–145)
SP GR UR STRIP: 1.01 (ref 1.02–1.02)
SQUAMOUS #/AREA URNS HPF: (no result) /HPF
TRIGL SERPL-MCNC: 164 MG/DL (ref ?–150)
UROBILINOGEN UR-MCNC: NORMAL MG/DL
VLDLC SERPL CALC-MCNC: 33 MG/DL (ref 5–40)
WBC # BLD AUTO: 9.1 10^3/UL (ref 4.3–11)
WBC #/AREA URNS HPF: (no result) /HPF

## 2019-10-09 PROCEDURE — 81000 URINALYSIS NONAUTO W/SCOPE: CPT

## 2019-10-09 PROCEDURE — 93458 L HRT ARTERY/VENTRICLE ANGIO: CPT

## 2019-10-09 PROCEDURE — 80061 LIPID PANEL: CPT

## 2019-10-09 PROCEDURE — 80053 COMPREHEN METABOLIC PANEL: CPT

## 2019-10-09 PROCEDURE — 87081 CULTURE SCREEN ONLY: CPT

## 2019-10-09 PROCEDURE — 71045 X-RAY EXAM CHEST 1 VIEW: CPT

## 2019-10-09 PROCEDURE — 36415 COLL VENOUS BLD VENIPUNCTURE: CPT

## 2019-10-09 PROCEDURE — 85610 PROTHROMBIN TIME: CPT

## 2019-10-09 PROCEDURE — 85730 THROMBOPLASTIN TIME PARTIAL: CPT

## 2019-10-09 PROCEDURE — 85027 COMPLETE CBC AUTOMATED: CPT

## 2019-10-09 NOTE — CARDIAC CATH REPORT
Cardiac Cath Report


Physician (s)/Assistant (s)


Physician


STEVE LUTHER MD





Pre-Procedure Diagnosis


Pre-Procedure Diagnosis:  Coronary artery disease





Post-Procedure Note


Procedure Start Date:  Oct 9, 2019


Name of Procedure:  


Left heart catheterization


Findings/Procedure Note


PROCEDURE NOTE:


64-year-old lady with history of hypertension, hyperlipidemia, had an abnormal 

stress test, scheduled for cardiac catheterization possible PTCA.





After explaining the procedure to the patient, all pros and cons were explained,

all questions were answered.  The patient signed the consent and then she  was 

placed on the cardiac catheterization laboratory. Groin was prepped SL fashion 

local anesthesia was used. Sheath placed in the Right radial artery, Lagro 

catheter was advanced to the left ventricular cavity then left main artery and 

angiogram was done, exchanged into JR catheter and advanced to the right 

coronary artery and angiogram was done


At the end of the procedure the sheath was removed. vascular band was used





FINDINGS:





Hemodynamics 


/10, end-diastolic pressure of 10


Aorta 121/65 mean of 87





ANATOMY:


Left Main is free of obstructive disease


Left Anterior Descending has mild disease nonobstructive disease


Left Circumflex has mild disease nonobstructive disease


Right Coronory Artery has mild disease nonobstructive disease


LV Gram was not done, pressure was measured





CONCLUSION:


1.  Mild coronary artery disease nonobstructive disease


2.  Normal left ventricular end-diastolic pressure





DISCUSSION AND RECOMMENDATION:


medical therapy is recommended no intervention is needed


Anesthesia Type:  Conscious Sedation


Estimated blood loss (mL):  15 ml


Contrast Amount:  33 ml


Total Radiation Dose:  562 mGy





Post-Procedure Diagnosis


Post-operative diagnosis:  


Chest pain 


Coronary artery disease 


Hypertension


Hyperlipidemia











STEVE LUTHER MD               Oct 9, 2019 10:55

## 2019-10-09 NOTE — DISCHARGE INST-POST CATH
Discharge Inst-CATH/EP


Problems Reviewed?:  Yes


Post Cardiac Cath/EP D/C Inst


Follow Up/Plan





Appointment with Dr. Vickers's office in 2-4 weeks


<b>CARDIAC CATH/EP PROCEDURE DISCHARGE INSTRUCTIONS</b>





ACTIVITY





* Go Home directly and rest.


* Limit activity of the leg (or wrist if it was used) for 7 days including 

aerobics, swimming,


   jogging, bicycling, etc.


* Restrict stair-climbing for 7 days if possible, if not, climb up with your 

non-cath leg, then


   bring together on the same step.


* Avoid lifting, pushing, pulling or excessive movement of the affected 

extremity for 7 days.


* Customary sexual activity may be resumed after 2 days-use caution not to use a

position  


   that strains or causes pain to the affected extremity.


* No driving for 24 hours.


* NO SMOKING. 


* Avoid straining for bowel movements for 7 days.


* Gentle walking on level ground is allowed.


* Returning to work will depend on the type of procedure and the results. Your 

doctor will discuss


   this with you.





CALL YOUR DOCTOR FOR ANY OF THE FOLLOWING:





*If bleeding from the puncture site occurs- Apply gentle pressure to site with 

clean cloth and call


   your doctor or EMS.


* If a knot or lump forms under the skin, increases in size, or causes pain.


* If bruising appears to be worsening or moving further down your leg instead of

disappearing.


* Temperature above 101 F.





CARE OF YOUR GROIN INCISION;





* Bruising or purple discoloration of the skin near the puncture site is common.


* You may shower only, no bathtub bathing for 5 days.  Be careful to avoid 

slipping as your


   leg may feel stiff.


* If a closure device was used on your femoral artery, please see the attached 

guide regarding


   care of the device and your leg.


* Leave dressing on FOR 24 hours.





CARE OF YOUR WRIST INCISION;





* Bruising or purple discoloration of the skin near the puncture site is common.


* You may shower.


* DO NOT submerge wrist.


* Leave dressing on FOR 24 hours.











STEVE VICKERS MD               Oct 9, 2019 10:56

## 2019-10-09 NOTE — CARDIAC PROCEDURE NOTE-CS/ASA
Pre-Procedure Note


Pre-Op Procedure Note


H&P Reviewed


The H&P was reviewed, patient examined and no changes noted.


Date H&P Reviewed:  Oct 9, 2019


Time H&P Reviewed:  10:16





Conscious Sedation Pre-Proced


Time


10:16





ASA Score


3


For ASA 3 and 4: Consider anesthesia and medical clearance. Also, for patients

with a history of failed moderate sedation consider anesthesia.

















Airway 


 


Lungs 


 


Heart 


 


 ASA score


 


 ASA 1: a normal healthy patient


 


 ASA 2:  a patient with a mild systemic disease (mid diabetes, controlled 

hypertension, obesity 


 


x ASA 3:  a patient with a severe systemic disease that limits activity  

(angina, COPD, prior Myocardial infarction)


 


 ASA 4:  a patient with an incapacitating disease that is a constant threat to 

life (CHF, renal failure)


 


 ASA 5:  a moribund patient not expected to survive 24 hrs.  (ruptured aneurysm)


 


 ASA 6:  a declared brain-dead patient whose organs are being harvested.


 


 For emergent operations, add the letter E after the classification











Mallampati Classification


Grade 3





Sedation Plan


Analgesia, Amnesia, Plan communicated to team members, Discussed options with 

patient/fam, Discussed risks with patient/fam


The patient is an appropriate candidate to undergo the planned procedure, 

sedation, and anesthesia.





The patient immediately re-assessed prior to indication.











STEVE LUTHER MD               Oct 9, 2019 10:16

## 2019-10-09 NOTE — DIAGNOSTIC IMAGING REPORT
INDICATION: Pre-heart catheterization.



TIME OF EXAM: 8:24 AM



No prior studies are available for comparison.



FINDINGS: The heart appears to be mildly enlarged. Lungs are

clear. Pulmonary vascularity is normal. No infiltrate, effusion

or pneumothorax is seen.



IMPRESSION: No acute cardiopulmonary process is detected.



Dictated by: 



  Dictated on workstation # [id number]

## 2019-10-09 NOTE — NUR
SPOKE WITH PT (SHE HAD HER BOTTLES) , CALLED [facility] AND DR. DOWD TO COMPLETE THE MED 
REC.



PT WAS ABLE TO TELL ME HOW/WHEN SHE TAKES ALL HER MEDS.



THE FOLLOWING ARE FILL DATES FROM [facility]:

2019 SIMVASTATIN #90/90DS

2019 LATANOPROST 56DS

2019 TIMOLOL 

2019 IRBESARTAN/HCTZ #30/30DS

2019 LEVOTHYROXINE #30/30DS

2019 AMLODIPINE #90/90DS



LINZESS: ON 2019 ON A SAMPLE OF #30 WAS GIVEN FROM DR. GRAMAJO OFFICE. THE 
DIRECTIONS THEY HAVE IS 1 TAB DAILY- BUT PT SAYS SHE ONLY USES PRN.



OTC MEDS:

ACETAMINOPEH: 1 Q 6 H PRN 

ASPIRIN 81M HS

FEXOFENADINE: 1 DAILY PRN 

FLONASE: UD PRN 

ONE DAILY COMPLETE: 1 DAILY

NAPROXEN: 1 TAB Q 6 H PRN 

AZO BLADDER CONTOL: 1 DAILY

## 2019-11-23 ENCOUNTER — HOSPITAL ENCOUNTER (OUTPATIENT)
Dept: HOSPITAL 75 - LAB | Age: 64
End: 2019-11-23
Attending: FAMILY MEDICINE
Payer: MEDICARE

## 2019-11-23 DIAGNOSIS — E03.9: Primary | ICD-10-CM

## 2019-11-23 LAB — T4 FREE SERPL-MCNC: 0.96 NG/DL (ref 0.7–1.48)

## 2019-11-23 PROCEDURE — 36415 COLL VENOUS BLD VENIPUNCTURE: CPT

## 2019-11-23 PROCEDURE — 84439 ASSAY OF FREE THYROXINE: CPT

## 2019-11-23 PROCEDURE — 84443 ASSAY THYROID STIM HORMONE: CPT

## 2019-11-26 ENCOUNTER — HOSPITAL ENCOUNTER (OUTPATIENT)
Dept: HOSPITAL 75 - PREOP | Age: 64
Discharge: HOME | End: 2019-11-26
Attending: SURGERY
Payer: MEDICARE

## 2019-11-26 VITALS — WEIGHT: 293 LBS | BODY MASS INDEX: 50.02 KG/M2 | HEIGHT: 64.02 IN

## 2019-11-26 DIAGNOSIS — Z01.818: Primary | ICD-10-CM

## 2019-12-04 ENCOUNTER — HOSPITAL ENCOUNTER (OUTPATIENT)
Dept: HOSPITAL 75 - SDC | Age: 64
Discharge: HOME | End: 2019-12-04
Attending: SURGERY
Payer: MEDICARE

## 2019-12-04 VITALS — SYSTOLIC BLOOD PRESSURE: 119 MMHG | DIASTOLIC BLOOD PRESSURE: 74 MMHG

## 2019-12-04 VITALS — SYSTOLIC BLOOD PRESSURE: 112 MMHG | DIASTOLIC BLOOD PRESSURE: 61 MMHG

## 2019-12-04 VITALS — SYSTOLIC BLOOD PRESSURE: 108 MMHG | DIASTOLIC BLOOD PRESSURE: 60 MMHG

## 2019-12-04 VITALS — DIASTOLIC BLOOD PRESSURE: 74 MMHG | SYSTOLIC BLOOD PRESSURE: 119 MMHG

## 2019-12-04 VITALS — DIASTOLIC BLOOD PRESSURE: 68 MMHG | SYSTOLIC BLOOD PRESSURE: 137 MMHG

## 2019-12-04 VITALS — DIASTOLIC BLOOD PRESSURE: 74 MMHG | SYSTOLIC BLOOD PRESSURE: 121 MMHG

## 2019-12-04 VITALS — WEIGHT: 293 LBS | HEIGHT: 64.02 IN | BODY MASS INDEX: 50.02 KG/M2

## 2019-12-04 DIAGNOSIS — I25.119: ICD-10-CM

## 2019-12-04 DIAGNOSIS — Z79.82: ICD-10-CM

## 2019-12-04 DIAGNOSIS — M19.90: ICD-10-CM

## 2019-12-04 DIAGNOSIS — D17.21: Primary | ICD-10-CM

## 2019-12-04 DIAGNOSIS — I27.20: ICD-10-CM

## 2019-12-04 DIAGNOSIS — E78.2: ICD-10-CM

## 2019-12-04 DIAGNOSIS — Z82.49: ICD-10-CM

## 2019-12-04 DIAGNOSIS — Z85.831: ICD-10-CM

## 2019-12-04 DIAGNOSIS — Z87.891: ICD-10-CM

## 2019-12-04 DIAGNOSIS — I10: ICD-10-CM

## 2019-12-04 DIAGNOSIS — Z83.2: ICD-10-CM

## 2019-12-04 DIAGNOSIS — E78.5: ICD-10-CM

## 2019-12-04 DIAGNOSIS — I65.29: ICD-10-CM

## 2019-12-04 DIAGNOSIS — Z79.899: ICD-10-CM

## 2019-12-04 PROCEDURE — 87081 CULTURE SCREEN ONLY: CPT

## 2019-12-04 PROCEDURE — 88304 TISSUE EXAM BY PATHOLOGIST: CPT

## 2019-12-04 NOTE — OPERATIVE REPORT
DATE OF SERVICE:  12/04/2019



PREOPERATIVE DIAGNOSIS:

Right arm mass.



POSTOPERATIVE DIAGNOSIS:

Right arm mass, pending pathology, most likely lipoma.



SURGEON:

Rishi Ziegler DO.



FIRST ASSISTANT:

None.



ANESTHESIA:

IV sedation by CRNA:



PROCEDURE PERFORMED:

Excision of right arm mass in subcutaneous tissue with a 4.6 cm incision.



SPECIMEN:

Right arm mass, most likely lipoma, measured probably 5 cm.



BLOOD LOSS:

Scant.



FLUIDS:

Per Anesthesia.



POSTOPERATIVE CONDITION:

Stable.



INDICATION FOR PROCEDURE:

The patient is a 64-year-old female who has a mass in the right arm, complaining

of pain with it and wanted this removed.  It was too big and would be too deep

to do it in the office, so done in the operating room.



FINDINGS:

The patient had a right arm mass removed, looks like it was most likely a

lipoma, measured about 5 x 5 x 3 cm, 4.6 cm incision.



PROCEDURE NOTE:

After informed consent was obtained, the patient was brought to the operating

room, placed on the operating table in supine position.  She was sterilely

prepped and draped in normal fashion.  Local lidocaine was used to infiltrate

the skin above this mass as well as around in a regional block, then made an

incision with #15 blade, carried down through the skin into subcutaneous tissue,

Incision measured about 4.6 cm, then deepened into subcutaneous tissue with

Bovie electrocautery as well as blunt dissection.  I was able to visualize this

mass.  It looked like a lipoma and had finger-like projections, removed this

with a Bovie electrocautery and blunt dissection.  I was able to get it out,

measured about 5 cm x 3 cm x 5 cm.  Once this was removed the site was irrigated
with

normal saline, hemostasis was obtained using Bovie electrocautery, then elected

to close the incision with 4-0 undyed Monocryl 4 interrupted subcuticular

stitches.  Area was cleaned and dried.  Dermabond placed as well as pressure

dressing.  The patient then transferred to recovery room in stable condition. 

Sponge, instrument and needle counts correct at the end of the case.





Job ID: [id number]

DocumentID: [id number]

Dictated Date:  12/04/2019 10:34:11

Transcription Date: 12/04/2019 15:06:49

Dictated By: RISHI ZIEGLER DO

[facility]

## 2019-12-04 NOTE — DISCHARGE INST-SURGICAL
Discharge Inst-Surgical


Depart Medication/Instructions


New, Converted or Re-Newed RX:  Other (take ibuprofen and tylenol for pain)


Patient Instructions


Follow up Appt:


Make appointment for 1 week. [phone]





Instructions:


No strenuous activity. 


May shower in 24 hours, no tub bath or soaking.


Use incentive spirometer at home as directed.


No Smoking





Skin/Wound Care:


May remove bandages in am.  You need to leave the Dermabond on incision it will 

fall off on it's own. 





Symptoms to Report:


Appetite Changes, Extremity Discoloration, Numbness/Tingling, Swelling 

Increased, Bleeding Excessive, Eyesight Changes, Pain Increased, Urine Color 

Change, Constipation(Persistent), Fever over 101 degree F, Pain/Pressure in 

chest, Urinating Difficulty, Cough Up/Vomit Blood, Heart Beat Irreg/Pounding, 

Pain/Pressure in jaw, Cramps in feet or legs, Lightheadedness, Pain/Pressure in 

shoulder, Diarrhea(Persistent), Memory Changes Suddenly, Questions/Concerns, 

Weight gain consecutive days, Dizziness/Fainting, Nausea/Vomiting, Shortness of 

Breath, Weight gain over 2 pounds








If questions or concerns contact your physician 


Or seek help at emergency department.





Activity


Activity as Tolerated:  Yes


Activity Instructions:  Avoid Stress to Incision


Driving Instructions:  No Driving for 24 Hours





Diet


Discharge Diet:  No Restrictions


Diet After 24 Hours:  Clear Liquid if Nauseous


If Any Problems/Questions/Issu:  Contact Your Physician, Go to Emergency Room





Skin/Wound Care


Infection Signs and Symptoms:  Increased Redness, Foul Odor of Wound, Increased 

Drainage, Skin Itchy or Has a Rash, Increased Swelling, Temperature Above 101  F


Bathing Instructions:  Shower


Stitches/Staples/Dermabond Dis:  Dermabond


Ice Pack:  Ice On and Off Site (as needed for pain)











SAAD RAMOS DO                Dec 4, 2019 10:30


POS

## 2019-12-04 NOTE — PROGRESS NOTE-POST OPERATIVE
Post-Operative Progess Note


Surgeon (s)/Assistant (s)


Surgeon


SAAD RAMOS DO


Assistant:  none





Pre-Operative Diagnosis


Right arm mass





Post-Operative Diagnosis





Same pending pathology





Procedure & Operative Findings


Date of Procedure


12/4/19


Procedure Performed/Findings


Exc of Right arm mass in Subcutaneous tissue, 4.6cm incision


Anesthesia Type


IV sedation by CRNA





Estimated Blood Loss


Estimated blood loss (mL):  scant





Specimens/Packing


Specimens Removed


right arm mass, measure appx 5x3cm











SAAD RAMOS DO                Dec 4, 2019 10:28


POS

## 2019-12-29 NOTE — XMS REPORT
Continuity of Care Document

                             Created on: 2019



Elena Deluca

External Reference #: [id number]

: 1955

Sex: Female



Demographics





                          Address                   [address] [place]  [address]

 

                          Home Phone                [phone] x

 

                          Preferred Language        Unknown

 

                          Marital Status            Unknown

 

                          Congregation Affiliation     Unknown

 

                          Race                      Unknown

 

                          Ethnic Group              Unknown





Author





                          Organization              Unknown

 

                          Address                   Unknown

 

                          Phone                     Unavailable



              



Allergies

      



             Active           Description           Code           Type         

  Severity   

                Reaction           Onset           Reported/Identified          

 

Relationship to Patient                 Clinical Status        

 

             Yes           No Known Allergies           [id number]                

        N/A   

             N/A                                                           

 

                Yes             No Known Drug Allergies           [id number]    

       Drug 

Allergy           Unknown           N/A                             2019  

      

                                                             



                    



Medications

      



There is no data.                  



Problems

      



             Date Dx Coded           Attending           Type           Code    

       

Diagnosis                               Diagnosed By        

 

                2019           GURDEEPESTERI L APRN           Ot           

   E78.49          

                          OTHER HYPERLIPIDEMIA                    

 

                2019           GURDEEP MARIN CHUN APRN           Ot           

   H40.89          

                          OTHER SPECIFIED GLAUCOMA                    

 

             2019           GURDEEP MARIN CHUN APRN           Ot           I10

           

ESSENTIAL (PRIMARY) HYPERTENSION                    

 

                2019           GURDEEP MARIN CHUN APRN           Ot           

   M19.90          

                          UNSPECIFIED OSTEOARTHRITIS, UNSPECIFIED               

      

 

             2019           MACKENZIEMARIN CHUN APRN           Ot           R10

.9           

UNSPECIFIED ABDOMINAL PAIN                       

 

                2019           MACKENZIEMARIN LOPEZ CHUN APRN           Ot           

   Z90.49          

                          ACQUIRED ABSENCE OF OTHER SPECIFIED PART              

      

 

                2019           GURDEEP MARIN CHUN APRN           Ot           

   E78.49          

                          OTHER HYPERLIPIDEMIA                    

 

                2019           MACKENZIEMARIN L APRN           Ot           

   H40.89          

                          OTHER SPECIFIED GLAUCOMA                    

 

             2019           GURDEEP MARIN CHUN APRN           Ot           I10

           

ESSENTIAL (PRIMARY) HYPERTENSION                    

 

                2019           GURDEEP MARIN CHUN APRN           Ot           

   M19.90          

                          UNSPECIFIED OSTEOARTHRITIS, UNSPECIFIED               

      

 

             2019           GURDEEP MARIN CHUN APRN           Ot           R10

.9           

UNSPECIFIED ABDOMINAL PAIN                       

 

                2019           GURDEEPMARIN CHUN APRN           Ot           

   Z90.49          

                          ACQUIRED ABSENCE OF OTHER SPECIFIED PART              

      

 

             2019           KAIA JUARZE MD           Ot           E78.

00           

PURE HYPERCHOLESTEROLEMIA, UNSPECIFIED                    

 

             2019           KAIA JUAREZ MD           Ot           E78.

49           

OTHER HYPERLIPIDEMIA                             

 

             2019           KAIA JUAREZ MD           Ot           H40.

89           

OTHER SPECIFIED GLAUCOMA                         

 

             2019           KAIA JUAREZ MD           Ot           I10 

          

ESSENTIAL (PRIMARY) HYPERTENSION                    

 

             2019           ANN MD, KAIA J           Ot           K59.

00           

CONSTIPATION, UNSPECIFIED                        

 

             2019           KAIA JUAREZ MD J           Ot           M19.

90           

UNSPECIFIED OSTEOARTHRITIS, UNSPECIFIED                     

 

             2019           KAIA JUAREZ MD           Ot           R10.

31           

RIGHT LOWER QUADRANT PAIN                        

 

             2019           KAIA JUAREZ MD           Ot           R10.

9           

UNSPECIFIED ABDOMINAL PAIN                       

 

             2019           KAIA JUAREZ MD           Ot           R11.

0           

NAUSEA                                           

 

             2019           KAIA JUAREZ MD           Ot           Z87.

19           

PERSONAL HISTORY OF OTHER DISEASES OF TH                    

 

             2019           KAIA JUAREZ MD           Ot           Z90.

49           

ACQUIRED ABSENCE OF OTHER SPECIFIED PART                    

 

             2019           KAIA JUAREZ MD           Ot           Z90.

89           

ACQUIRED ABSENCE OF OTHER ORGANS                    

 

                2019           MARIN MACKENZIE APRN           Ot           

   E78.49          

                          OTHER HYPERLIPIDEMIA                    

 

                2019           MARIN MACKENZIE L APRN           Ot           

   H40.89          

                          OTHER SPECIFIED GLAUCOMA                    

 

             2019           MARIN MACKENZIE APRN           Ot           I10

           

ESSENTIAL (PRIMARY) HYPERTENSION                    

 

                2019           MARIN MACKENZIE L APRN           Ot           

   M19.90          

                          UNSPECIFIED OSTEOARTHRITIS, UNSPECIFIED               

      

 

             2019           MARIN MACKENZIE L APRN           Ot           R10

.9           

UNSPECIFIED ABDOMINAL PAIN                       

 

                2019           MARIN MACKENZIE L APRN           Ot           

   Z90.49          

                          ACQUIRED ABSENCE OF OTHER SPECIFIED PART              

      

 

                2019           GWEN ROGERS APRN           Ot          

    E05.90         

                          THYROTOXICOSIS, UNSP WITHOUT THYROTOXIC               

      

 

                2019           GWEN ROGERS APRN           Ot          

    E78.2          

                          MIXED HYPERLIPIDEMIA                    

 

                2019           GWEN ROGERS APRN           Ot          

    R01.1          

                          CARDIAC MURMUR, UNSPECIFIED                    

 

                2019           GWEN ROGERS APRN           Ot          

    R01.1          

                          CARDIAC MURMUR, UNSPECIFIED                    

 

                2019           GWEN ROGERS APRN           Ot          

    E78.5          

                          HYPERLIPIDEMIA, UNSPECIFIED                    

 

             2019           GWEN ROGERS APRN           Ot           I1

0           

ESSENTIAL (PRIMARY) HYPERTENSION                    

 

                2019           GWEN ROGERS APRN           Ot          

    R60.9          

                          EDEMA, UNSPECIFIED                    

 

                2019           GWEN ROGERS APRN           Ot          

    R01.1          

                          CARDIAC MURMUR, UNSPECIFIED                    

 

             2019           HOMA MERRILL, STEVE CONTRERAS           Ot           E66.

01           

MORBID (SEVERE) OBESITY DUE TO EXCESS CA                    

 

             2019           STEVE LUTHER MD           Ot           I11.

9           

HYPERTENSIVE HEART DISEASE WITHOUT HEART                    

 

             2019           STEVE LUTHER MD           Ot           I25.

89           

OTHER FORMS OF CHRONIC ISCHEMIC HEART DI                    

 

             10/09/2019           STEVE LUTHER MD           Ot           E66.

9           

OBESITY, UNSPECIFIED                             

 

             10/09/2019           STEVE LUTHER MD           Ot           E78.

5           

HYPERLIPIDEMIA, UNSPECIFIED                      

 

             10/09/2019           STEVE LUTHER MD           Ot           I11.

9           

HYPERTENSIVE HEART DISEASE WITHOUT HEART                    

 

             10/09/2019           STEVE LUTHER MD           Ot           I25.

10           

ATHSCL HEART DISEASE OF NATIVE CORONARY                     

 

             10/09/2019           STEVE LUTHER MD           Ot           I27.

20           

PULMONARY HYPERTENSION, UNSPECIFIED                    

 

             10/09/2019           STEVE LUTHER MD           Ot           I65.

23           

OCCLUSION AND STENOSIS OF BILATERAL ANDERSON                    

 

             10/09/2019           STEVE LUTHER MD           Ot           R01.

1           

CARDIAC MURMUR, UNSPECIFIED                      

 

             10/09/2019           STEVE LUTHER MD           Ot           Z68.

44           

BODY MASS INDEX (BMI) 60.0-69.9, ADULT                    

 

             10/09/2019           STEVE LUTHER MD           Ot           Z79.

82           

LONG TERM (CURRENT) USE OF ASPIRIN                    

 

                10/09/2019           STEVE LUTHER MD           Ot            

  Z79.899          

                          OTHER LONG TERM (CURRENT) DRUG THERAPY                

    

 

                10/09/2019           STEVE LUTHER MD           Ot            

  Z87.891          

                          PERSONAL HISTORY OF NICOTINE DEPENDENCE               

     

 

             10/12/2019           STEVE LUTHER MD           Ot           E66.

9           

OBESITY, UNSPECIFIED                             

 

             10/12/2019           STEVE LUTHER MD           Ot           E78.

5           

HYPERLIPIDEMIA, UNSPECIFIED                      

 

             10/12/2019           STEVE LUTHER MD           Ot           I11.

9           

HYPERTENSIVE HEART DISEASE WITHOUT HEART                    

 

             10/12/2019           STEVE LUTHER MD           Ot           I25.

10           

ATHSCL HEART DISEASE OF NATIVE CORONARY                     

 

             10/12/2019           STEVE LUTHER MD           Ot           I27.

20           

PULMONARY HYPERTENSION, UNSPECIFIED                    

 

             10/12/2019           STEVE LUTHER MD           Ot           I65.

23           

OCCLUSION AND STENOSIS OF BILATERAL ANDERSON                    

 

             10/12/2019           STEVE LUTHER MD           Ot           R01.

1           

CARDIAC MURMUR, UNSPECIFIED                      

 

             10/12/2019           STEVE LUTHER MD           Ot           Z68.

44           

BODY MASS INDEX (BMI) 60.0-69.9, ADULT                    

 

             10/12/2019           STEVE LUTHER MD           Ot           Z79.

82           

LONG TERM (CURRENT) USE OF ASPIRIN                    

 

                10/12/2019           STEVE LUTHER MD           Ot            

  Z79.899          

                          OTHER LONG TERM (CURRENT) DRUG THERAPY                

    

 

                10/12/2019           STEVE LUTHER MD           Ot            

  Z87.891          

                          PERSONAL HISTORY OF NICOTINE DEPENDENCE               

     

 

                2019           NILE MERRILL, MAXIMILIANO HERRERA           Ot          

    E03.9          

                          HYPOTHYROIDISM, UNSPECIFIED                    

 

                2019           NILE MERRILL, MAXIMILIANO HERRERA           Ot          

    E03.9          

                          HYPOTHYROIDISM, UNSPECIFIED                    

 

             2019           SAAD RAMOS DO B           Ot           Z01.8

18           

ENCOUNTER FOR OTHER PREPROCEDURAL EXAMIN                    

 

             2019           SAAD RAMOS DO B           Ot           D17.2

1           

BENIGN LIPOMATOUS NEOPLASM OF SKIN, SUBC                    

 

             2019           SAAD RAMOS DO B           Ot           E78.2

           

MIXED HYPERLIPIDEMIA                             

 

             2019           SAAD RAMOS DO B           Ot           E78.5

           

HYPERLIPIDEMIA, UNSPECIFIED                      

 

             2019           SAAD RAMOS DO B           Ot           I10  

         

ESSENTIAL (PRIMARY) HYPERTENSION                    

 

             2019           SAAD RAMOS DO B           Ot           I25.1

19           

ATHSCL HEART DISEASE OF NATIVE COR ART W                    

 

             2019           THELMA RAMOS DOIC B           Ot           I27.2

0           

PULMONARY HYPERTENSION, UNSPECIFIED                    

 

             2019           SAAD RAMOS DO B           Ot           I65.2

9           

OCCLUSION AND STENOSIS OF UNSPECIFIED CA                    

 

             2019           SAAD RAMOS DO B           Ot           M19.9

0           

UNSPECIFIED OSTEOARTHRITIS, UNSPECIFIED                     

 

             2019           SAAD RAMOS DO B           Ot           Z68.4

4           

BODY MASS INDEX (BMI) 60.0-69.9, ADULT                    

 

             2019           SAAD RAMOS DO           Ot           Z79.8

2           

LONG TERM (CURRENT) USE OF ASPIRIN                    

 

             2019           SAAD RAMOS DO B           Ot           Z79.8

99           

OTHER LONG TERM (CURRENT) DRUG THERAPY                    

 

             2019           SAAD RAMOS DO           Ot           Z82.4

9           

FAMILY HX OF ISCHEM HEART DIS AND OTH DI                    

 

             2019           SAAD RAMOS DO B           Ot           Z83.2

           

FAMILY HISTORY OF DIS OF THE BLD/BLD-FOR                    

 

             2019           SAAD RAMOS DO           Ot           Z85.8

31           

PERSONAL HISTORY OF MALIGNANT NEOPLASM O                    

 

             2019           SAAD RAMOS DO           Ot           Z87.8

91           

PERSONAL HISTORY OF NICOTINE DEPENDENCE                    

 

                2019           NILE MERRILL, MAXIMILIANO HERRERA           Ot          

    E03.9          

                          HYPOTHYROIDISM, UNSPECIFIED                    



                                                                                
                                                                                
                  



Procedures

      



There is no data.                  



Results

      



                    Test                Result              Range        

 

                                        Complete blood count (CBC) with automate

d white blood cell (WBC) differential - 

19 16:20         

 

                          Blood leukocytes automated count (number/volume)      

     8.8 10*3/uL          

                                        4.3-11.0        

 

                          Blood erythrocytes automated count (number/volume)    

       4.36 10*6/uL       

                                        4.35-5.85        

 

                    Venous blood hemoglobin measurement (mass/volume)           

13.2 g/dL           

11.5-16.0        

 

                    Blood hematocrit (volume fraction)           39 %           

     35-52        

 

                    Automated erythrocyte mean corpuscular volume           90 [

foz_us]           

80-99        

 

                                        Automated erythrocyte mean corpuscular h

emoglobin (mass per erythrocyte)        

                          30 pg                     25-34        

 

                                        Automated erythrocyte mean corpuscular h

emoglobin concentration measurement 

(mass/volume)             34 g/dL                   32-36        

 

                    Automated erythrocyte distribution width ratio           13.

0 %              10.0-

14.5        

 

                    Automated blood platelet count (count/volume)           359 

10*3/uL           

130-400        

 

                          Automated blood platelet mean volume measurement      

     10.1 [foz_us]        

                                        7.4-10.4        

 

                    Automated blood neutrophils/100 leukocytes           62 %   

             42-75       

 

 

                    Automated blood lymphocytes/100 leukocytes           28 %   

             12-44       

 

 

                    Blood monocytes/100 leukocytes           7 %                

 0-12        

 

                    Automated blood eosinophils/100 leukocytes           2 %    

             0-10        

 

                    Automated blood basophils/100 leukocytes           0 %      

           0-10        

 

                    Blood neutrophils automated count (number/volume)           

5.5 10*3            

1.8-7.8        

 

                    Blood lymphocytes automated count (number/volume)           

2.5 10*3            

1.0-4.0        

 

                    Blood monocytes automated count (number/volume)           0.

6 10*3            

0.0-1.0        

 

                    Automated eosinophil count           0.2 10*3/uL           0

.0-0.3        

 

                    Automated blood basophil count (count/volume)           0.0 

10*3/uL           

0.0-0.1        

 

                                        Comprehensive metabolic panel - 19

 16:20         

 

                          Serum or plasma sodium measurement (moles/volume)     

      140 mmol/L          

                                        135-145        

 

                          Serum or plasma potassium measurement (moles/volume)  

         4.1 mmol/L       

                                        3.6-5.0        

 

                          Serum or plasma chloride measurement (moles/volume)   

        106 mmol/L        

                                                

 

                    Carbon dioxide           22 mmol/L           21-32        

 

                          Serum or plasma anion gap determination (moles/volume)

           12 mmol/L      

                                        5-14        

 

                          Serum or plasma urea nitrogen measurement (mass/volume

)           31 mg/dL      

                                        7-18        

 

                          Serum or plasma creatinine measurement (mass/volume)  

         1.18 mg/dL       

                                        0.60-1.30        

 

                    Serum or plasma urea nitrogen/creatinine mass ratio         

  26                  NRG 

       

 

                                        Serum or plasma creatinine measurement w

ith calculation of estimated glomerular 

filtration rate           46                        NRG        

 

                    Serum or plasma glucose measurement (mass/volume)           

154 mg/dL           

        

 

                          Serum or plasma calcium measurement (mass/volume)     

      10.6 mg/dL          

                                        8.5-10.1        

 

                          Serum or plasma total bilirubin measurement (mass/volu

me)           0.5 mg/dL   

                                        0.1-1.0        

 

                                        Serum or plasma alkaline phosphatase meenakshi

surement (enzymatic activity/volume)    

                          96 U/L                            

 

                                        Serum or plasma aspartate aminotransfera

se measurement (enzymatic 

activity/volume)           19 U/L                    5-34        

 

                                        Serum or plasma alanine aminotransferase

 measurement (enzymatic activity/volume)

                          22 U/L                    0-55        

 

                    Serum or plasma protein measurement (mass/volume)           

7.1 g/dL            

6.4-8.2        

 

                    Serum or plasma albumin measurement (mass/volume)           

4.2 g/dL            

3.2-4.5        

 

                    CALCIUM CORRECTED           10.4 mg/dL           8.5-10.1   

     

 

                                        Serum or plasma C reactive protein measu

rement (mass/volume) - 19 16:20   

      

 

                          Serum or plasma C reactive protein measurement (mass/v

olume)           0.39 

mg/dL                                   0.00-0.50        

 

                                        Complete urinalysis with reflex to cultu

re - 19 17:47         

 

                    Urine color determination           YELLOW              NRG 

       

 

                    Urine clarity determination           CLEAR               NR

G        

 

                    Urine pH measurement by test strip           5              

     5-9        

 

                    Specific gravity of urine by test strip           1.020     

          1.016-1.022  

      

 

                          Urine protein assay by test strip, semi-quantitative  

         NEGATIVE         

                                        NEGATIVE        

 

                    Urine glucose detection by automated test strip           NE

GATIVE            

NEGATIVE        

 

                          Erythrocytes detection in urine sediment by light micr

oscopy           NEGATIVE 

                                        NEGATIVE        

 

                    Urine ketones detection by automated test strip           1+

                  NEGATIVE

        

 

                    Urine nitrite detection by test strip           NEGATIVE    

        NEGATIVE    

    

 

                    Urine total bilirubin detection by test strip           NEGA

TIVE            

NEGATIVE        

 

                          Urine urobilinogen measurement by automated test strip

 (mass/volume)           

NORMAL                                  NORMAL        

 

                    Urine leukocyte esterase detection by dipstick           NEG

ATIVE            

NEGATIVE        

 

                                        Automated urine sediment erythrocyte cou

nt by microscopy (number/high power 

field)                     [HPF]                    NRG        

 

                                        Automated urine sediment leukocyte count

 by microscopy (number/high power field)

                           [HPF]                    NRG        

 

                          Bacteria detection in urine sediment by light microsco

py           LARGE        

                                        NRG        

 

                                        Squamous epithelial cells detection in u

rine sediment by light microscopy       

                          2-5                       NRG        

 

                          Crystals detection in urine sediment by light microsco

py           NONE         

                                        NRG        

 

                    Casts detection in urine sediment by light microscopy       

    NONE                

NRG        

 

                          Mucus detection in urine sediment by light microscopy 

          NEGATIVE        

                                        NRG        

 

                    Complete urinalysis with reflex to culture           YES    

             NRG        

 

                                        Bacterial urine culture - 19 17:47

         

 

                    Bacterial urine culture           [id number]            NRG  

      

 

                    COLONY COUNT           >100,000/ML            NRG        

 

                    FTX;REPORTABLE           SENSITIVITY REPORTED 19 12:05 

           NRG      

  

 

                    FREE TEXT ENTRY 2           ID REPORTED 19 16:05       

     NRG        

 

                                        RML Sensitivity Panel - 19 17:47  

       

 

                          Gentamicin susceptibility test by minimum inhibitory c

oncentration           <= 

                                        NRG        

 

                                        Trimethoprim/sulfamethoxazole susceptibi

lity test by minimum 

inhibitoryconcentration           <=                        NRG        

 

                          Levofloxacin susceptibility test by minimum inhibitory

 concentration           

<=                                      NRG        

 

                          Ampicillin susceptibility test by minimum inhibitory c

oncentration           <= 

                                        NRG        

 

                          Cefazolin susceptibility test by minimum inhibitory co

ncentration           <=  

                                        NRG        

 

                          Ceftriaxone susceptibility test by minimum inhibitory 

concentration           <=

                                        NRG        

 

                          Ciprofloxacin susceptibility test by minimum inhibitor

y concentration           

<=                                      NRG        

 

                          Meropenem susceptibility test by minimum inhibitory co

ncentration           <=  

                                        NRG        

 

                                        Nitrofurantoin susceptibility test by mi

nimum inhibitory concentration          

                          <=                        NRG        

 

                    Amoxicillin and clavulanate potassium susc DANO           <= 

                 NRG      

  

 

                                        Complete blood count (CBC) with automate

d white blood cell (WBC) differential - 

19 07:25         

 

                          Blood leukocytes automated count (number/volume)      

     7.9 10*3/uL          

                                        4.3-11.0        

 

                          Blood erythrocytes automated count (number/volume)    

       4.23 10*6/uL       

                                        4.35-5.85        

 

                    Venous blood hemoglobin measurement (mass/volume)           

12.8 g/dL           

11.5-16.0        

 

                    Blood hematocrit (volume fraction)           38 %           

     35-52        

 

                    Automated erythrocyte mean corpuscular volume           91 [

foz_us]           

80-99        

 

                                        Automated erythrocyte mean corpuscular h

emoglobin (mass per erythrocyte)        

                          30 pg                     25-34        

 

                                        Automated erythrocyte mean corpuscular h

emoglobin concentration measurement 

(mass/volume)             33 g/dL                   32-36        

 

                    Automated erythrocyte distribution width ratio           13.

2 %              10.0-

14.5        

 

                    Automated blood platelet count (count/volume)           273 

10*3/uL           

130-400        

 

                          Automated blood platelet mean volume measurement      

     10.1 [foz_us]        

                                        7.4-10.4        

 

                    Automated blood neutrophils/100 leukocytes           61 %   

             42-75       

 

 

                    Automated blood lymphocytes/100 leukocytes           27 %   

             12-44       

 

 

                    Blood monocytes/100 leukocytes           9 %                

 0-12        

 

                    Automated blood eosinophils/100 leukocytes           4 %    

             0-10        

 

                    Automated blood basophils/100 leukocytes           0 %      

           0-10        

 

                    Blood neutrophils automated count (number/volume)           

4.8 10*3            

1.8-7.8        

 

                    Blood lymphocytes automated count (number/volume)           

2.1 10*3            

1.0-4.0        

 

                    Blood monocytes automated count (number/volume)           0.

7 10*3            

0.0-1.0        

 

                    Automated eosinophil count           0.3 10*3/uL           0

.0-0.3        

 

                    Automated blood basophil count (count/volume)           0.0 

10*3/uL           

0.0-0.1        

 

                                        Comprehensive metabolic panel - 19

 07:25         

 

                          Serum or plasma sodium measurement (moles/volume)     

      140 mmol/L          

                                        135-145        

 

                          Serum or plasma potassium measurement (moles/volume)  

         4.3 mmol/L       

                                        3.6-5.0        

 

                          Serum or plasma chloride measurement (moles/volume)   

        104 mmol/L        

                                                

 

                    Carbon dioxide           24 mmol/L           21-32        

 

                          Serum or plasma anion gap determination (moles/volume)

           12 mmol/L      

                                        5-14        

 

                          Serum or plasma urea nitrogen measurement (mass/volume

)           35 mg/dL      

                                        7-18        

 

                          Serum or plasma creatinine measurement (mass/volume)  

         1.11 mg/dL       

                                        0.60-1.30        

 

                    Serum or plasma urea nitrogen/creatinine mass ratio         

  32                  NRG 

       

 

                                        Serum or plasma creatinine measurement w

ith calculation of estimated glomerular 

filtration rate           49                        NRG        

 

                    Serum or plasma glucose measurement (mass/volume)           

134 mg/dL           

        

 

                          Serum or plasma calcium measurement (mass/volume)     

      10.7 mg/dL          

                                        8.5-10.1        

 

                          Serum or plasma total bilirubin measurement (mass/volu

me)           0.5 mg/dL   

                                        0.1-1.0        

 

                                        Serum or plasma alkaline phosphatase meenakshi

surement (enzymatic activity/volume)    

                          108 U/L                           

 

                                        Serum or plasma aspartate aminotransfera

se measurement (enzymatic 

activity/volume)           17 U/L                    5-34        

 

                                        Serum or plasma alanine aminotransferase

 measurement (enzymatic activity/volume)

                          17 U/L                    0-55        

 

                    Serum or plasma protein measurement (mass/volume)           

7.2 g/dL            

6.4-8.2        

 

                    Serum or plasma albumin measurement (mass/volume)           

4.0 g/dL            

3.2-4.5        

 

                    CALCIUM CORRECTED           10.7 mg/dL           8.5-10.1   

     

 

                                        Lipid 1996 panel - 19 07:25       

  

 

                          Serum or plasma triglyceride measurement (mass/volume)

           165 mg/dL      

                                        <150        

 

                          Serum or plasma cholesterol measurement (mass/volume) 

          191 mg/dL       

                                        < 200        

 

                          Serum or plasma cholesterol in HDL measurement (mass/v

olume)           52 mg/dL 

                                        40-60        

 

                          Cholesterol in LDL [mass/volume] in serum or plasma by

 direct assay           

112 mg/dL                               1-129        

 

                          Serum or plasma cholesterol in VLDL measurement (mass/

volume)           33 mg/dL

                                        5-40        

 

                                        THYROID STIMULATING HORMONE - 19 0

7:25         

 

                    THYROID STIMULATING HORMONE           7.06 u[iU]/mL         

  0.35-4.94        

 

                                        Serum or plasma thyroxine (T4) free dina

urement (mass/volume) - 19 07:25  

       

 

                          Serum or plasma thyroxine (T4) free measurement (mass/

volume)           0.92 

ng/dL                                   0.70-1.48        

 

                                        Hemoglobin A1c measurement - 19 07

:25         

 

                    Blood hemoglobin A1C measurement (mass/volume)           5.8

 %               4.0-5.6

        

 

                    MEAN BLOOD GLUCOSE           120 %               <=126      

  

 

                                        Serum or plasma intact pararthyroid horm

one measurement (mass/volume) - 19

 07:25         

 

                          Serum or plasma intact parathyroid hormone measurement

 (mass/volume)           

76.3 pg/mL                              9.0-77.0        

 

                          Bio-intact parathyroid hormone (PTH) measurement with 

calcium           10.5 %  

                                        8.5-10.5        

 

                                        Complete urinalysis with reflex to cultu

re - 10/09/19 08:00         

 

                    Urine color determination           YELLOW              NRG 

       

 

                    Urine clarity determination           CLEAR               NR

G        

 

                    Urine pH measurement by test strip           6.5            

     5-9        

 

                    Specific gravity of urine by test strip           1.010     

          1.016-1.022  

      

 

                          Urine protein assay by test strip, semi-quantitative  

         NEGATIVE         

                                        NEGATIVE        

 

                    Urine glucose detection by automated test strip           NE

GATIVE            

NEGATIVE        

 

                          Erythrocytes detection in urine sediment by light micr

oscopy           NEGATIVE 

                                        NEGATIVE        

 

                    Urine ketones detection by automated test strip           NE

GATIVE            

NEGATIVE        

 

                    Urine nitrite detection by test strip           NEGATIVE    

        NEGATIVE    

    

 

                    Urine total bilirubin detection by test strip           NEGA

TIVE            

NEGATIVE        

 

                          Urine urobilinogen measurement by automated test strip

 (mass/volume)           

NORMAL                                  NORMAL        

 

                    Urine leukocyte esterase detection by dipstick           NEG

ATIVE            

NEGATIVE        

 

                                        Automated urine sediment erythrocyte cou

nt by microscopy (number/high power 

field)                    NONE                      NRG        

 

                                        Automated urine sediment leukocyte count

 by microscopy (number/high power field)

                          NONE                      NRG        

 

                          Bacteria detection in urine sediment by light microsco

py           NEGATIVE     

                                        NRG        

 

                                        Squamous epithelial cells detection in u

rine sediment by light microscopy       

                          RARE                      NRG        

 

                          Crystals detection in urine sediment by light microsco

py           NONE         

                                        NRG        

 

                    Casts detection in urine sediment by light microscopy       

    NONE                

NRG        

 

                          Mucus detection in urine sediment by light microscopy 

          NEGATIVE        

                                        NRG        

 

                    Complete urinalysis with reflex to culture           NO     

             NRG        

 

                                        PT panel in platelet poor plasma by coag

ulation assay - 10/09/19 08:14         

 

                          Prothrombin time (PT) in platelet poor plasma by coagu

lation assay           

14.0 s                                  12.2-14.7        

 

                          INR in platelet poor plasma or blood by coagulation as

say           1.0         

                                        0.8-1.4        

 

                                        Activated partial thromboplastin time (a

PTT) in platelet poor plasma 

bycoagulation assay - 10/09/19 08:14         

 

                                        Activated partial thromboplastin time (a

PTT) in platelet poor plasma 

bycoagulation assay           30 s                      24-35        

 

                                        Comprehensive metabolic panel - 10/09/19

 08:14         

 

                          Serum or plasma sodium measurement (moles/volume)     

      142 mmol/L          

                                        135-145        

 

                          Serum or plasma potassium measurement (moles/volume)  

         4.3 mmol/L       

                                        3.6-5.0        

 

                          Serum or plasma chloride measurement (moles/volume)   

        107 mmol/L        

                                                

 

                    Carbon dioxide           25 mmol/L           21-32        

 

                          Serum or plasma anion gap determination (moles/volume)

           10 mmol/L      

                                        5-14        

 

                          Serum or plasma urea nitrogen measurement (mass/volume

)           32 mg/dL      

                                        7-18        

 

                          Serum or plasma creatinine measurement (mass/volume)  

         1.07 mg/dL       

                                        0.60-1.30        

 

                    Serum or plasma urea nitrogen/creatinine mass ratio         

  30                  NRG 

       

 

                                        Serum or plasma creatinine measurement w

ith calculation of estimated glomerular 

filtration rate           52                        NRG        

 

                    Serum or plasma glucose measurement (mass/volume)           

117 mg/dL           

        

 

                          Serum or plasma calcium measurement (mass/volume)     

      10.8 mg/dL          

                                        8.5-10.1        

 

                          Serum or plasma total bilirubin measurement (mass/volu

me)           0.6 mg/dL   

                                        0.1-1.0        

 

                                        Serum or plasma alkaline phosphatase meenakshi

surement (enzymatic activity/volume)    

                          108 U/L                           

 

                                        Serum or plasma aspartate aminotransfera

se measurement (enzymatic 

activity/volume)           21 U/L                    5-34        

 

                                        Serum or plasma alanine aminotransferase

 measurement (enzymatic activity/volume)

                          26 U/L                    0-55        

 

                    Serum or plasma protein measurement (mass/volume)           

7.8 g/dL            

6.4-8.2        

 

                    Serum or plasma albumin measurement (mass/volume)           

4.5 g/dL            

3.2-4.5        

 

                    CALCIUM CORRECTED           10.4 mg/dL           8.5-10.1   

     

 

                                        Lipid 1996 panel - 10/09/19 08:14       

  

 

                          Serum or plasma triglyceride measurement (mass/volume)

           164 mg/dL      

                                        <150        

 

                          Serum or plasma cholesterol measurement (mass/volume) 

          200 mg/dL       

                                        < 200        

 

                          Serum or plasma cholesterol in HDL measurement (mass/v

olume)           58 mg/dL 

                                        40-60        

 

                          Cholesterol in LDL [mass/volume] in serum or plasma by

 direct assay           

121 mg/dL                               1-129        

 

                          Serum or plasma cholesterol in VLDL measurement (mass/

volume)           33 mg/dL

                                        5-40        

 

                                        Automated blood complete blood count (he

mogram) panel - 10/09/19 08:14         

 

                          Blood leukocytes automated count (number/volume)      

     9.1 10*3/uL          

                                        4.3-11.0        

 

                          Blood erythrocytes automated count (number/volume)    

       4.39 10*6/uL       

                                        4.35-5.85        

 

                    Venous blood hemoglobin measurement (mass/volume)           

13.4 g/dL           

11.5-16.0        

 

                    Blood hematocrit (volume fraction)           40 %           

     35-52        

 

                    Automated erythrocyte mean corpuscular volume           92 [

foz_us]           

80-99        

 

                                        Automated erythrocyte mean corpuscular h

emoglobin (mass per erythrocyte)        

                          31 pg                     25-34        

 

                                        Automated erythrocyte mean corpuscular h

emoglobin concentration measurement 

(mass/volume)             33 g/dL                   32-36        

 

                    Automated erythrocyte distribution width ratio           13.

1 %              10.0-

14.5        

 

                    Automated blood platelet count (count/volume)           278 

10*3/uL           

130-400        

 

                          Automated blood platelet mean volume measurement      

     10.2 [foz_us]        

                                        7.4-10.4        

 

                                        Methicillin resistant Staphylococcus aur

eus (MRSA) screening culture - 10/09/19 

08:14         

 

                          Methicillin resistant Staphylococcus aureus (MRSA) scr

eening culture           

NEG                                     NRG        

 

                                        THYROID STIMULATING HORMONE - 19 0

8:52         

 

                    THYROID STIMULATING HORMONE           3.60 u[iU]/mL         

  0.35-4.94        

 

                                        Serum or plasma thyroxine (T4) free dina

urement (mass/volume) - 19 08:52  

       

 

                          Serum or plasma thyroxine (T4) free measurement (mass/

volume)           0.96 

ng/dL                                   0.70-1.48        

 

                                        Methicillin resistant Staphylococcus aur

eus (MRSA) screening culture - 19 

08:20         

 

                          Methicillin resistant Staphylococcus aureus (MRSA) scr

eening culture           

NEG                                     NRG        



                                                            



Encounters

      



                ACCT No.           Visit Date/Time           Discharge          

 Status         

             Pt. Type           Provider           Facility           Loc./Unit 

          

Complaint        

 

                [id number]           2019 10:20:51           2019 23:59:

59           [facility]

                Outpatient           [facility]                           

         

                                                 

 

                [id number]           2018 08:54:46           2018 23:59:

59           [facility]

                Outpatient           [facility]                           

         

                                                 

 

                [id number]           10/12/2017 08:59:55           10/12/2017 23:59:

59           [facility]

                Outpatient           [facility]                           

         

                                                 

 

                [id number]           2017 10:42:27           2017 23:59:

59           [facility]

                Outpatient           [facility]                           

         

                                                 

 

                [id number]           2017 10:03:10           2017 23:59:

59           [facility]

                Outpatient           [facility]                           

         

                                                 

 

                [id number]           2016 08:50:38           2016 23:59:

59           [facility]

                Outpatient           [facility]                           

         

                                                 

 

                [id number]           10/26/2015 09:23:33           10/26/2015 23:59:

59           [facility]

                Outpatient           [facility]                           

         

                                                 

 

                [id number]           2015 10:44:50           2015 23:59:

59           [facility]

                Outpatient           [facility]                           

         

                                                 

 

                [id number]           2014 11:01:48           2014 23:59:

59           [facility]

                Outpatient           [facility]                           

         

                                                 

 

                    [id number]           2019 07:42:00            11:30:00        

                DIS             Outpatient           SAAD RAMOS DO          

 [facility]                       [place] [address]PPER ARM 

       

 

                    [id number]           2019 05:31:00            12:49:00        

                DIS             Outpatient           SAAD RAMOS DO          

 [facility]           PREOP                     [place] RIGHT [place]PPER ARM 

       

 

                    [id number]           2019 08:41:00            23:59:59        

                [facility]             Outpatient           MAXIMILIANO DOWD MD       

    [facility]           LAB                       HYPOTHYROID        

 

                    [id number]           10/09/2019 07:40:00           10/09/2

019 14:11:00        

                DIS             Outpatient           STEVE LUTHER MD         

  [facility]           CATH                      ABN STRESS, CHF, CAD, H

TN, SOB    

    

 

                    [id number]           2019 08:08:00            23:59:59        

                CLS             Outpatient           STEVE LUTHER MD         

  [facility]           CARD                      HTN        

 

                    [id number]           2019 09:34:00            23:59:59        

                CLS             Outpatient           GWEN ROGERS       

    [facility]           CARD                      [place]      

  

 

                    [id number]           2019 07:13:00            23:59:59        

                CLS             Outpatient           GWEN ROGERS APRWALLY       

    [facility]           LAB                       HTN,EDEMA,HYPERLIPIDEM

[place]        

 

                    [id number]           2019 15:40:00            23:59:59        

                CLS             Outpatient           MARIN MACKENZIE APRN        

   [facility]           RAD                       R FLANK PAIN        

 

                    [id number]           2019 16:06:00            18:40:00        

                DIS             Outpatient           KAIA JUAREZ MD         

  [facility]           ER                        RT SIDE PAIN,NAUSEA,VOM

ITTING       

 

 

                [id number]            2019 10:15:30           2019 23:59:5

9           CLS  

             Outpatient                                                         

  

 

             [id number]           2018 12:52:16                              

       Document

 Registration

## 2020-06-13 ENCOUNTER — HOSPITAL ENCOUNTER (OUTPATIENT)
Dept: HOSPITAL 75 - LAB | Age: 65
End: 2020-06-13
Attending: FAMILY MEDICINE
Payer: MEDICARE

## 2020-06-13 DIAGNOSIS — E03.9: ICD-10-CM

## 2020-06-13 DIAGNOSIS — R73.9: Primary | ICD-10-CM

## 2020-06-13 DIAGNOSIS — I10: ICD-10-CM

## 2020-06-13 LAB
ALBUMIN SERPL-MCNC: 4.4 GM/DL (ref 3.2–4.5)
ALP SERPL-CCNC: 94 U/L (ref 40–136)
ALT SERPL-CCNC: 26 U/L (ref 0–55)
BASOPHILS # BLD AUTO: 0 10^3/UL (ref 0–0.1)
BASOPHILS NFR BLD AUTO: 0 % (ref 0–10)
BILIRUB SERPL-MCNC: 0.5 MG/DL (ref 0.1–1)
BUN/CREAT SERPL: 23
CALCIUM SERPL-MCNC: 10.9 MG/DL (ref 8.5–10.1)
CHLORIDE SERPL-SCNC: 104 MMOL/L (ref 98–107)
CHOLEST SERPL-MCNC: 187 MG/DL (ref ?–200)
CO2 SERPL-SCNC: 24 MMOL/L (ref 21–32)
CREAT SERPL-MCNC: 1.15 MG/DL (ref 0.6–1.3)
EOSINOPHIL # BLD AUTO: 0.2 10^3/UL (ref 0–0.3)
EOSINOPHIL NFR BLD AUTO: 3 % (ref 0–10)
ERYTHROCYTE [DISTWIDTH] IN BLOOD BY AUTOMATED COUNT: 13.1 % (ref 10–14.5)
GFR SERPLBLD BASED ON 1.73 SQ M-ARVRAT: 47 ML/MIN
GLUCOSE SERPL-MCNC: 137 MG/DL (ref 70–105)
HCT VFR BLD CALC: 41 % (ref 35–52)
HDLC SERPL-MCNC: 56 MG/DL (ref 40–60)
HGB BLD-MCNC: 13.7 G/DL (ref 11.5–16)
LYMPHOCYTES # BLD AUTO: 1.6 X 10^3 (ref 1–4)
LYMPHOCYTES NFR BLD AUTO: 23 % (ref 12–44)
MANUAL DIFFERENTIAL PERFORMED BLD QL: NO
MCH RBC QN AUTO: 31 PG (ref 25–34)
MCHC RBC AUTO-ENTMCNC: 34 G/DL (ref 32–36)
MCV RBC AUTO: 91 FL (ref 80–99)
MONOCYTES # BLD AUTO: 0.5 X 10^3 (ref 0–1)
MONOCYTES NFR BLD AUTO: 8 % (ref 0–12)
NEUTROPHILS # BLD AUTO: 4.6 X 10^3 (ref 1.8–7.8)
NEUTROPHILS NFR BLD AUTO: 66 % (ref 42–75)
PLATELET # BLD: 272 10^3/UL (ref 130–400)
PMV BLD AUTO: 10.4 FL (ref 7.4–10.4)
POTASSIUM SERPL-SCNC: 3.9 MMOL/L (ref 3.6–5)
PROT SERPL-MCNC: 7.8 GM/DL (ref 6.4–8.2)
SODIUM SERPL-SCNC: 139 MMOL/L (ref 135–145)
T4 FREE SERPL-MCNC: 1.03 NG/DL (ref 0.7–1.48)
TRIGL SERPL-MCNC: 154 MG/DL (ref ?–150)
VLDLC SERPL CALC-MCNC: 31 MG/DL (ref 5–40)
WBC # BLD AUTO: 7 10^3/UL (ref 4.3–11)

## 2020-06-13 PROCEDURE — 84439 ASSAY OF FREE THYROXINE: CPT

## 2020-06-13 PROCEDURE — 83036 HEMOGLOBIN GLYCOSYLATED A1C: CPT

## 2020-06-13 PROCEDURE — 80061 LIPID PANEL: CPT

## 2020-06-13 PROCEDURE — 36415 COLL VENOUS BLD VENIPUNCTURE: CPT

## 2020-06-13 PROCEDURE — 80053 COMPREHEN METABOLIC PANEL: CPT

## 2020-06-13 PROCEDURE — 84443 ASSAY THYROID STIM HORMONE: CPT

## 2020-06-13 PROCEDURE — 85025 COMPLETE CBC W/AUTO DIFF WBC: CPT

## 2020-07-06 ENCOUNTER — HOSPITAL ENCOUNTER (OUTPATIENT)
Dept: HOSPITAL 75 - CARD | Age: 65
End: 2020-07-06
Attending: INTERNAL MEDICINE
Payer: MEDICARE

## 2020-07-06 DIAGNOSIS — I25.10: ICD-10-CM

## 2020-07-06 DIAGNOSIS — I07.1: Primary | ICD-10-CM

## 2020-07-06 DIAGNOSIS — H34.8192: ICD-10-CM

## 2020-07-06 PROCEDURE — 93306 TTE W/DOPPLER COMPLETE: CPT

## 2020-09-28 ENCOUNTER — HOSPITAL ENCOUNTER (OUTPATIENT)
Dept: HOSPITAL 75 - LAB | Age: 65
End: 2020-09-28
Attending: FAMILY MEDICINE
Payer: MEDICARE

## 2020-09-28 DIAGNOSIS — Z01.89: Primary | ICD-10-CM

## 2020-09-28 LAB
ALBUMIN SERPL-MCNC: 4.2 GM/DL (ref 3.2–4.5)
ALP SERPL-CCNC: 110 U/L (ref 40–136)
ALT SERPL-CCNC: 25 U/L (ref 0–55)
BILIRUB SERPL-MCNC: 0.5 MG/DL (ref 0.1–1)
BUN/CREAT SERPL: 21
CALCIUM SERPL-MCNC: 10.5 MG/DL (ref 8.5–10.1)
CHLORIDE SERPL-SCNC: 104 MMOL/L (ref 98–107)
CO2 SERPL-SCNC: 27 MMOL/L (ref 21–32)
CREAT SERPL-MCNC: 1.17 MG/DL (ref 0.6–1.3)
GFR SERPLBLD BASED ON 1.73 SQ M-ARVRAT: 46 ML/MIN
GLUCOSE SERPL-MCNC: 146 MG/DL (ref 70–105)
POTASSIUM SERPL-SCNC: 4.5 MMOL/L (ref 3.6–5)
PROT SERPL-MCNC: 7.8 GM/DL (ref 6.4–8.2)
SODIUM SERPL-SCNC: 140 MMOL/L (ref 135–145)

## 2020-09-28 PROCEDURE — 83036 HEMOGLOBIN GLYCOSYLATED A1C: CPT

## 2020-09-28 PROCEDURE — 80053 COMPREHEN METABOLIC PANEL: CPT

## 2020-09-28 PROCEDURE — 36415 COLL VENOUS BLD VENIPUNCTURE: CPT

## 2021-01-05 ENCOUNTER — HOSPITAL ENCOUNTER (OUTPATIENT)
Dept: HOSPITAL 75 - LAB | Age: 66
End: 2021-01-05
Attending: NURSE PRACTITIONER
Payer: MEDICARE

## 2021-01-05 DIAGNOSIS — I10: ICD-10-CM

## 2021-01-05 DIAGNOSIS — R73.01: ICD-10-CM

## 2021-01-05 DIAGNOSIS — Z13.6: Primary | ICD-10-CM

## 2021-01-05 LAB
ALBUMIN SERPL-MCNC: 4.2 GM/DL (ref 3.2–4.5)
ALP SERPL-CCNC: 113 U/L (ref 40–136)
ALT SERPL-CCNC: 30 U/L (ref 0–55)
BASOPHILS # BLD AUTO: 0 10^3/UL (ref 0–0.1)
BASOPHILS NFR BLD AUTO: 0 % (ref 0–10)
BILIRUB SERPL-MCNC: 0.5 MG/DL (ref 0.1–1)
BUN/CREAT SERPL: 23
CALCIUM SERPL-MCNC: 10.4 MG/DL (ref 8.5–10.1)
CHLORIDE SERPL-SCNC: 103 MMOL/L (ref 98–107)
CHOLEST SERPL-MCNC: 207 MG/DL (ref ?–200)
CO2 SERPL-SCNC: 27 MMOL/L (ref 21–32)
CREAT SERPL-MCNC: 1.1 MG/DL (ref 0.6–1.3)
EOSINOPHIL # BLD AUTO: 0.4 10^3/UL (ref 0–0.3)
EOSINOPHIL NFR BLD AUTO: 5 % (ref 0–10)
GFR SERPLBLD BASED ON 1.73 SQ M-ARVRAT: 50 ML/MIN
GLUCOSE SERPL-MCNC: 157 MG/DL (ref 70–105)
HCT VFR BLD CALC: 41 % (ref 35–52)
HDLC SERPL-MCNC: 54 MG/DL (ref 40–60)
HGB BLD-MCNC: 13.2 G/DL (ref 11.5–16)
LYMPHOCYTES # BLD AUTO: 1.7 10^3/UL (ref 1–4)
LYMPHOCYTES NFR BLD AUTO: 23 % (ref 12–44)
MANUAL DIFFERENTIAL PERFORMED BLD QL: NO
MCH RBC QN AUTO: 30 PG (ref 25–34)
MCHC RBC AUTO-ENTMCNC: 32 G/DL (ref 32–36)
MCV RBC AUTO: 93 FL (ref 80–99)
MONOCYTES # BLD AUTO: 0.6 10^3/UL (ref 0–1)
MONOCYTES NFR BLD AUTO: 8 % (ref 0–12)
NEUTROPHILS # BLD AUTO: 4.8 10^3/UL (ref 1.8–7.8)
NEUTROPHILS NFR BLD AUTO: 64 % (ref 42–75)
PLATELET # BLD: 289 10^3/UL (ref 130–400)
PMV BLD AUTO: 10 FL (ref 9–12.2)
POTASSIUM SERPL-SCNC: 4.4 MMOL/L (ref 3.6–5)
PROT SERPL-MCNC: 7.9 GM/DL (ref 6.4–8.2)
SODIUM SERPL-SCNC: 141 MMOL/L (ref 135–145)
TRIGL SERPL-MCNC: 187 MG/DL (ref ?–150)
VLDLC SERPL CALC-MCNC: 37 MG/DL (ref 5–40)
WBC # BLD AUTO: 7.6 10^3/UL (ref 4.3–11)

## 2021-01-05 PROCEDURE — 80061 LIPID PANEL: CPT

## 2021-01-05 PROCEDURE — 80053 COMPREHEN METABOLIC PANEL: CPT

## 2021-01-05 PROCEDURE — 85025 COMPLETE CBC W/AUTO DIFF WBC: CPT

## 2021-01-05 PROCEDURE — 83036 HEMOGLOBIN GLYCOSYLATED A1C: CPT

## 2021-01-05 PROCEDURE — 36415 COLL VENOUS BLD VENIPUNCTURE: CPT

## 2021-07-03 ENCOUNTER — HOSPITAL ENCOUNTER (OUTPATIENT)
Dept: HOSPITAL 75 - LAB | Age: 66
End: 2021-07-03
Attending: NURSE PRACTITIONER
Payer: MEDICARE

## 2021-07-03 DIAGNOSIS — E11.9: Primary | ICD-10-CM

## 2021-07-03 DIAGNOSIS — I10: ICD-10-CM

## 2021-07-03 LAB
ALBUMIN SERPL-MCNC: 4.3 GM/DL (ref 3.2–4.5)
ALP SERPL-CCNC: 98 U/L (ref 40–136)
ALT SERPL-CCNC: 32 U/L (ref 0–55)
BASOPHILS # BLD AUTO: 0 10^3/UL (ref 0–0.1)
BASOPHILS NFR BLD AUTO: 1 % (ref 0–10)
BILIRUB SERPL-MCNC: 0.6 MG/DL (ref 0.1–1)
BUN/CREAT SERPL: 21
CALCIUM SERPL-MCNC: 11.1 MG/DL (ref 8.5–10.1)
CHLORIDE SERPL-SCNC: 101 MMOL/L (ref 98–107)
CHOLEST SERPL-MCNC: 193 MG/DL (ref ?–200)
CO2 SERPL-SCNC: 28 MMOL/L (ref 21–32)
CREAT SERPL-MCNC: 1.16 MG/DL (ref 0.6–1.3)
EOSINOPHIL # BLD AUTO: 0.4 10^3/UL (ref 0–0.3)
EOSINOPHIL NFR BLD AUTO: 6 % (ref 0–10)
GFR SERPLBLD BASED ON 1.73 SQ M-ARVRAT: 47 ML/MIN
GLUCOSE SERPL-MCNC: 176 MG/DL (ref 70–105)
HCT VFR BLD CALC: 41 % (ref 35–52)
HDLC SERPL-MCNC: 53 MG/DL (ref 40–60)
HGB BLD-MCNC: 13.4 G/DL (ref 11.5–16)
LYMPHOCYTES # BLD AUTO: 1.6 10^3/UL (ref 1–4)
LYMPHOCYTES NFR BLD AUTO: 23 % (ref 12–44)
MANUAL DIFFERENTIAL PERFORMED BLD QL: NO
MCH RBC QN AUTO: 30 PG (ref 25–34)
MCHC RBC AUTO-ENTMCNC: 32 G/DL (ref 32–36)
MCV RBC AUTO: 94 FL (ref 80–99)
MONOCYTES # BLD AUTO: 0.6 10^3/UL (ref 0–1)
MONOCYTES NFR BLD AUTO: 9 % (ref 0–12)
NEUTROPHILS # BLD AUTO: 4 10^3/UL (ref 1.8–7.8)
NEUTROPHILS NFR BLD AUTO: 60 % (ref 42–75)
PLATELET # BLD: 271 10^3/UL (ref 130–400)
PMV BLD AUTO: 9.7 FL (ref 9–12.2)
POTASSIUM SERPL-SCNC: 4.4 MMOL/L (ref 3.6–5)
PROT SERPL-MCNC: 7.6 GM/DL (ref 6.4–8.2)
SODIUM SERPL-SCNC: 139 MMOL/L (ref 135–145)
TRIGL SERPL-MCNC: 198 MG/DL (ref ?–150)
VLDLC SERPL CALC-MCNC: 40 MG/DL (ref 5–40)
WBC # BLD AUTO: 6.6 10^3/UL (ref 4.3–11)

## 2021-07-03 PROCEDURE — 85025 COMPLETE CBC W/AUTO DIFF WBC: CPT

## 2021-07-03 PROCEDURE — 80053 COMPREHEN METABOLIC PANEL: CPT

## 2021-07-03 PROCEDURE — 80061 LIPID PANEL: CPT

## 2021-07-03 PROCEDURE — 84443 ASSAY THYROID STIM HORMONE: CPT

## 2021-07-03 PROCEDURE — 83036 HEMOGLOBIN GLYCOSYLATED A1C: CPT

## 2021-07-03 PROCEDURE — 36415 COLL VENOUS BLD VENIPUNCTURE: CPT

## 2021-08-14 ENCOUNTER — HOSPITAL ENCOUNTER (OUTPATIENT)
Dept: HOSPITAL 75 - LAB | Age: 66
End: 2021-08-14
Attending: NURSE PRACTITIONER
Payer: MEDICARE

## 2021-08-14 DIAGNOSIS — E83.52: Primary | ICD-10-CM

## 2021-08-14 LAB
ALBUMIN SERPL-MCNC: 4.1 GM/DL (ref 3.2–4.5)
ALP SERPL-CCNC: 100 U/L (ref 40–136)
ALT SERPL-CCNC: 20 U/L (ref 0–55)
BILIRUB SERPL-MCNC: 0.7 MG/DL (ref 0.1–1)
BUN/CREAT SERPL: 23
CALCIUM SERPL-MCNC: 10.3 MG/DL (ref 8.5–10.1)
CHLORIDE SERPL-SCNC: 104 MMOL/L (ref 98–107)
CO2 SERPL-SCNC: 23 MMOL/L (ref 21–32)
CREAT SERPL-MCNC: 1.16 MG/DL (ref 0.6–1.3)
GFR SERPLBLD BASED ON 1.73 SQ M-ARVRAT: 47 ML/MIN
GLUCOSE SERPL-MCNC: 168 MG/DL (ref 70–105)
POTASSIUM SERPL-SCNC: 4.6 MMOL/L (ref 3.6–5)
PROT SERPL-MCNC: 7.5 GM/DL (ref 6.4–8.2)
SODIUM SERPL-SCNC: 141 MMOL/L (ref 135–145)

## 2021-08-14 PROCEDURE — 80053 COMPREHEN METABOLIC PANEL: CPT

## 2021-08-14 PROCEDURE — 36415 COLL VENOUS BLD VENIPUNCTURE: CPT

## 2021-10-30 ENCOUNTER — HOSPITAL ENCOUNTER (OUTPATIENT)
Dept: HOSPITAL 75 - LAB | Age: 66
End: 2021-10-30
Attending: NURSE PRACTITIONER
Payer: MEDICARE

## 2021-10-30 DIAGNOSIS — E11.65: Primary | ICD-10-CM

## 2021-10-30 LAB
ALBUMIN SERPL-MCNC: 4.2 GM/DL (ref 3.2–4.5)
ALP SERPL-CCNC: 112 U/L (ref 40–136)
ALT SERPL-CCNC: 20 U/L (ref 0–55)
BILIRUB SERPL-MCNC: 0.8 MG/DL (ref 0.1–1)
BUN/CREAT SERPL: 19
CALCIUM SERPL-MCNC: 10.2 MG/DL (ref 8.5–10.1)
CHLORIDE SERPL-SCNC: 103 MMOL/L (ref 98–107)
CO2 SERPL-SCNC: 24 MMOL/L (ref 21–32)
CREAT SERPL-MCNC: 1.13 MG/DL (ref 0.6–1.3)
GFR SERPLBLD BASED ON 1.73 SQ M-ARVRAT: 48 ML/MIN
GLUCOSE SERPL-MCNC: 144 MG/DL (ref 70–105)
POTASSIUM SERPL-SCNC: 4.3 MMOL/L (ref 3.6–5)
PROT SERPL-MCNC: 7.4 GM/DL (ref 6.4–8.2)
SODIUM SERPL-SCNC: 139 MMOL/L (ref 135–145)

## 2021-10-30 PROCEDURE — 36415 COLL VENOUS BLD VENIPUNCTURE: CPT

## 2021-10-30 PROCEDURE — 83036 HEMOGLOBIN GLYCOSYLATED A1C: CPT

## 2021-10-30 PROCEDURE — 80053 COMPREHEN METABOLIC PANEL: CPT

## 2022-01-08 ENCOUNTER — HOSPITAL ENCOUNTER (OUTPATIENT)
Dept: HOSPITAL 75 - LAB | Age: 67
End: 2022-01-08
Attending: NURSE PRACTITIONER
Payer: MEDICARE

## 2022-01-08 DIAGNOSIS — N18.30: Primary | ICD-10-CM

## 2022-01-08 DIAGNOSIS — R89.9: ICD-10-CM

## 2022-01-08 LAB
ALBUMIN SERPL-MCNC: 4.1 GM/DL (ref 3.2–4.5)
ALP SERPL-CCNC: 118 U/L (ref 40–136)
ALT SERPL-CCNC: 24 U/L (ref 0–55)
BILIRUB SERPL-MCNC: 0.6 MG/DL (ref 0.1–1)
BUN/CREAT SERPL: 21
CALCIUM SERPL-MCNC: 10.4 MG/DL (ref 8.5–10.1)
CHLORIDE SERPL-SCNC: 102 MMOL/L (ref 98–107)
CO2 SERPL-SCNC: 24 MMOL/L (ref 21–32)
CREAT SERPL-MCNC: 1.21 MG/DL (ref 0.6–1.3)
GFR SERPLBLD BASED ON 1.73 SQ M-ARVRAT: 45 ML/MIN
GLUCOSE SERPL-MCNC: 167 MG/DL (ref 70–105)
POTASSIUM SERPL-SCNC: 4.7 MMOL/L (ref 3.6–5)
PROT SERPL-MCNC: 7.6 GM/DL (ref 6.4–8.2)
SODIUM SERPL-SCNC: 138 MMOL/L (ref 135–145)

## 2022-01-08 PROCEDURE — 80053 COMPREHEN METABOLIC PANEL: CPT

## 2022-01-08 PROCEDURE — 36415 COLL VENOUS BLD VENIPUNCTURE: CPT

## 2022-04-18 ENCOUNTER — HOSPITAL ENCOUNTER (OUTPATIENT)
Dept: HOSPITAL 75 - CARD | Age: 67
End: 2022-04-18
Attending: INTERNAL MEDICINE
Payer: MEDICARE

## 2022-04-18 DIAGNOSIS — I10: Primary | ICD-10-CM

## 2022-04-18 PROCEDURE — 93306 TTE W/DOPPLER COMPLETE: CPT

## 2022-04-23 ENCOUNTER — HOSPITAL ENCOUNTER (OUTPATIENT)
Dept: HOSPITAL 75 - LAB | Age: 67
End: 2022-04-23
Attending: NURSE PRACTITIONER
Payer: MEDICARE

## 2022-04-23 ENCOUNTER — HOSPITAL ENCOUNTER (OUTPATIENT)
Dept: HOSPITAL 75 - LAB | Age: 67
End: 2022-04-23
Attending: INTERNAL MEDICINE
Payer: MEDICARE

## 2022-04-23 DIAGNOSIS — R60.0: ICD-10-CM

## 2022-04-23 DIAGNOSIS — I25.10: Primary | ICD-10-CM

## 2022-04-23 DIAGNOSIS — R06.09: ICD-10-CM

## 2022-04-23 DIAGNOSIS — N18.30: ICD-10-CM

## 2022-04-23 DIAGNOSIS — I12.9: ICD-10-CM

## 2022-04-23 DIAGNOSIS — E03.8: ICD-10-CM

## 2022-04-23 DIAGNOSIS — E11.65: Primary | ICD-10-CM

## 2022-04-23 DIAGNOSIS — I65.23: ICD-10-CM

## 2022-04-23 DIAGNOSIS — R78.2: ICD-10-CM

## 2022-04-23 DIAGNOSIS — I10: ICD-10-CM

## 2022-04-23 LAB
ALBUMIN SERPL-MCNC: 4.2 GM/DL (ref 3.2–4.5)
ALBUMIN SERPL-MCNC: 4.2 GM/DL (ref 3.2–4.5)
ALP SERPL-CCNC: 98 U/L (ref 40–136)
ALP SERPL-CCNC: 98 U/L (ref 40–136)
ALT SERPL-CCNC: 24 U/L (ref 0–55)
ALT SERPL-CCNC: 24 U/L (ref 0–55)
BASOPHILS # BLD AUTO: 0 10^3/UL (ref 0–0.1)
BASOPHILS NFR BLD AUTO: 0 % (ref 0–10)
BILIRUB SERPL-MCNC: 0.7 MG/DL (ref 0.1–1)
BILIRUB SERPL-MCNC: 0.7 MG/DL (ref 0.1–1)
BUN/CREAT SERPL: 19
BUN/CREAT SERPL: 19
CALCIUM SERPL-MCNC: 10.6 MG/DL (ref 8.5–10.1)
CALCIUM SERPL-MCNC: 10.6 MG/DL (ref 8.5–10.1)
CHLORIDE SERPL-SCNC: 100 MMOL/L (ref 98–107)
CHLORIDE SERPL-SCNC: 100 MMOL/L (ref 98–107)
CHOLEST SERPL-MCNC: 189 MG/DL (ref ?–200)
CHOLEST SERPL-MCNC: 189 MG/DL (ref ?–200)
CO2 SERPL-SCNC: 29 MMOL/L (ref 21–32)
CO2 SERPL-SCNC: 29 MMOL/L (ref 21–32)
CREAT SERPL-MCNC: 1.14 MG/DL (ref 0.6–1.3)
CREAT SERPL-MCNC: 1.14 MG/DL (ref 0.6–1.3)
EOSINOPHIL # BLD AUTO: 0.2 10^3/UL (ref 0–0.3)
EOSINOPHIL NFR BLD AUTO: 3 % (ref 0–10)
GFR SERPLBLD BASED ON 1.73 SQ M-ARVRAT: 53 ML/MIN
GFR SERPLBLD BASED ON 1.73 SQ M-ARVRAT: 53 ML/MIN
GLUCOSE SERPL-MCNC: 160 MG/DL (ref 70–105)
GLUCOSE SERPL-MCNC: 160 MG/DL (ref 70–105)
HCT VFR BLD CALC: 45 % (ref 35–52)
HDLC SERPL-MCNC: 46 MG/DL (ref 40–60)
HDLC SERPL-MCNC: 46 MG/DL (ref 40–60)
HGB BLD-MCNC: 14.2 G/DL (ref 11.5–16)
LYMPHOCYTES # BLD AUTO: 1.2 10^3/UL (ref 1–4)
LYMPHOCYTES NFR BLD AUTO: 17 % (ref 12–44)
MANUAL DIFFERENTIAL PERFORMED BLD QL: NO
MCH RBC QN AUTO: 30 PG (ref 25–34)
MCHC RBC AUTO-ENTMCNC: 32 G/DL (ref 32–36)
MCV RBC AUTO: 94 FL (ref 80–99)
MONOCYTES # BLD AUTO: 0.6 10^3/UL (ref 0–1)
MONOCYTES NFR BLD AUTO: 8 % (ref 0–12)
NEUTROPHILS # BLD AUTO: 5 10^3/UL (ref 1.8–7.8)
NEUTROPHILS NFR BLD AUTO: 71 % (ref 42–75)
PLATELET # BLD: 264 10^3/UL (ref 130–400)
PMV BLD AUTO: 9.9 FL (ref 9–12.2)
POTASSIUM SERPL-SCNC: 4.2 MMOL/L (ref 3.6–5)
POTASSIUM SERPL-SCNC: 4.2 MMOL/L (ref 3.6–5)
PROT SERPL-MCNC: 7.8 GM/DL (ref 6.4–8.2)
PROT SERPL-MCNC: 7.8 GM/DL (ref 6.4–8.2)
SODIUM SERPL-SCNC: 143 MMOL/L (ref 135–145)
SODIUM SERPL-SCNC: 143 MMOL/L (ref 135–145)
T4 FREE SERPL-MCNC: 1.03 NG/DL (ref 0.7–1.48)
TRIGL SERPL-MCNC: 186 MG/DL (ref ?–150)
TRIGL SERPL-MCNC: 186 MG/DL (ref ?–150)
VLDLC SERPL CALC-MCNC: 37 MG/DL (ref 5–40)
VLDLC SERPL CALC-MCNC: 37 MG/DL (ref 5–40)
WBC # BLD AUTO: 7 10^3/UL (ref 4.3–11)

## 2022-04-23 PROCEDURE — 36415 COLL VENOUS BLD VENIPUNCTURE: CPT

## 2022-04-23 PROCEDURE — 85025 COMPLETE CBC W/AUTO DIFF WBC: CPT

## 2022-04-23 PROCEDURE — 80061 LIPID PANEL: CPT

## 2022-04-23 PROCEDURE — 80053 COMPREHEN METABOLIC PANEL: CPT

## 2022-04-23 PROCEDURE — 84439 ASSAY OF FREE THYROXINE: CPT

## 2022-04-23 PROCEDURE — 84443 ASSAY THYROID STIM HORMONE: CPT

## 2022-04-23 PROCEDURE — 83036 HEMOGLOBIN GLYCOSYLATED A1C: CPT

## 2022-06-19 ENCOUNTER — HOSPITAL ENCOUNTER (EMERGENCY)
Dept: HOSPITAL 75 - ER | Age: 67
Discharge: HOME | End: 2022-06-19
Payer: MEDICARE

## 2022-06-19 VITALS — SYSTOLIC BLOOD PRESSURE: 171 MMHG | DIASTOLIC BLOOD PRESSURE: 81 MMHG

## 2022-06-19 VITALS — HEIGHT: 64.17 IN | WEIGHT: 293 LBS | BODY MASS INDEX: 50.02 KG/M2

## 2022-06-19 DIAGNOSIS — L03.116: Primary | ICD-10-CM

## 2022-06-19 DIAGNOSIS — L03.115: ICD-10-CM

## 2022-06-19 LAB
ALBUMIN SERPL-MCNC: 4.1 GM/DL (ref 3.2–4.5)
ALP SERPL-CCNC: 100 U/L (ref 40–136)
ALT SERPL-CCNC: 19 U/L (ref 0–55)
BASOPHILS # BLD AUTO: 0 10^3/UL (ref 0–0.1)
BASOPHILS NFR BLD AUTO: 0 % (ref 0–10)
BILIRUB SERPL-MCNC: 0.6 MG/DL (ref 0.1–1)
BUN/CREAT SERPL: 21
CALCIUM SERPL-MCNC: 10.5 MG/DL (ref 8.5–10.1)
CHLORIDE SERPL-SCNC: 103 MMOL/L (ref 98–107)
CO2 SERPL-SCNC: 24 MMOL/L (ref 21–32)
CREAT SERPL-MCNC: 1.09 MG/DL (ref 0.6–1.3)
EOSINOPHIL # BLD AUTO: 0.2 10^3/UL (ref 0–0.3)
EOSINOPHIL NFR BLD AUTO: 3 % (ref 0–10)
GFR SERPLBLD BASED ON 1.73 SQ M-ARVRAT: 56 ML/MIN
GLUCOSE SERPL-MCNC: 175 MG/DL (ref 70–105)
HCT VFR BLD CALC: 44 % (ref 35–52)
HGB BLD-MCNC: 13.9 G/DL (ref 11.5–16)
LYMPHOCYTES # BLD AUTO: 1.5 10^3/UL (ref 1–4)
LYMPHOCYTES NFR BLD AUTO: 19 % (ref 12–44)
MANUAL DIFFERENTIAL PERFORMED BLD QL: NO
MCH RBC QN AUTO: 29 PG (ref 25–34)
MCHC RBC AUTO-ENTMCNC: 32 G/DL (ref 32–36)
MCV RBC AUTO: 93 FL (ref 80–99)
MONOCYTES # BLD AUTO: 0.7 10^3/UL (ref 0–1)
MONOCYTES NFR BLD AUTO: 8 % (ref 0–12)
NEUTROPHILS # BLD AUTO: 5.5 10^3/UL (ref 1.8–7.8)
NEUTROPHILS NFR BLD AUTO: 69 % (ref 42–75)
PLATELET # BLD: 294 10^3/UL (ref 130–400)
PMV BLD AUTO: 9.9 FL (ref 9–12.2)
POTASSIUM SERPL-SCNC: 4.4 MMOL/L (ref 3.6–5)
PROT SERPL-MCNC: 7.6 GM/DL (ref 6.4–8.2)
SODIUM SERPL-SCNC: 142 MMOL/L (ref 135–145)
WBC # BLD AUTO: 8.1 10^3/UL (ref 4.3–11)

## 2022-06-19 PROCEDURE — 80053 COMPREHEN METABOLIC PANEL: CPT

## 2022-06-19 PROCEDURE — 36415 COLL VENOUS BLD VENIPUNCTURE: CPT

## 2022-06-19 PROCEDURE — 87205 SMEAR GRAM STAIN: CPT

## 2022-06-19 PROCEDURE — 85025 COMPLETE CBC W/AUTO DIFF WBC: CPT

## 2022-06-19 PROCEDURE — 87077 CULTURE AEROBIC IDENTIFY: CPT

## 2022-06-19 PROCEDURE — 73620 X-RAY EXAM OF FOOT: CPT

## 2022-06-19 PROCEDURE — 87070 CULTURE OTHR SPECIMN AEROBIC: CPT

## 2022-06-19 NOTE — DIAGNOSTIC IMAGING REPORT
INDICATION: Bilateral foot pain, swelling and redness with warmth

and blisters.



EXAMINATION: Right foot, 06/19/2022.



FINDINGS: 2 views of the foot.



There is marked soft tissue prominence about the foot and ankle.

Plantar calcaneal spurring is noted with marked spurring and

degenerative disease along the dorsum of the midfoot. There is a

hallux valgus deformity with narrowing at the 1st

metatarsophalangeal joint. No acute fracture or dislocation. No

destructive osseous findings.



IMPRESSION: Diffuse chronic findings with no acute osseous

abnormality. 



Dictated by: 



  Dictated on workstation # [id number]

## 2022-06-19 NOTE — ED INTEGUMENTARY GENERAL
General


Chief Complaint:  Skin/Wound Problems


Stated Complaint:  RASH ON LEGS/BOTTOM OF FEET PAIN


Nursing Triage Note:  


PT REPORTS BILAT FOOT PAIN- BILAT LOWER LEGS ARE SWOLLEN, RED, WARM W/ BLISTERS-




STARTED 2 WEEKS AGO PER PT


Source:  patient


Exam Limitations:  no limitations





History of Present Illness


Date Seen by Provider:  2022


Time Seen by Provider:  18:07


Initial Comments


This is a 66 yo female with history of cellulitis of her bilateral lower 

extremities who presented for increased redness and pain in her right foot. 

States she has been given Cephalexin with some relief of symptoms but shortly 

after stopping her symptoms worsened. She is not sure if she stepped on 

something or if she injured her foot. Would like evaluation of her right foot 

pain. No fever, chills, nausea, vomiting, diarrhea, abdominal pain.





Allergies and Home Medications


Allergies


Coded Allergies:  


     No Known Drug Allergies (Unverified , 19)





Patient Home Medication List


Home Medication List Reviewed:  Yes


Acetaminophen (Tylenol Arthritis) 650 Mg Tablet.er, 650 MG PO Q6H PRN for PAIN-

MILD, (Reported)


   Entered as Reported by: ADRIAN DAVIS on 10/9/19 0902


Amlodipine Besylate (Amlodipine Besylate) 10 Mg Tablet, 10 MG PO DAILY, 

(Reported)


   Entered as Reported by: ADRIAN DAVIS on 10/9/19 0902


Aspirin (Aspirin EC) 81 Mg Tablet.dr, 81 MG PO HS, (Reported)


   Entered as Reported by: ADRIAN DAVIS on 10/9/19 0902


Clindamycin HCl (Clindamycin HCl) 300 Mg Capsule, 300 MG PO TID


   Prescribed by: OLIVERIO DEVINE on 22 182


Fexofenadine HCl (Allegra Allergy) 180 Mg Tablet, 180 MG PO DAILY PRN for 

ALLERGY SYMPTOMS, (Reported)


   Entered as Reported by: ADRIAN DAVIS on 10/9/19 0902


Fluticasone Propionate (Flonase Allergy Relief) 9.9 Ml Friant.susp, 1 SPRAY NS 

Q12H, (Reported)


   Entered as Reported by: ADRIAN DAVIS on 10/9/19 0902


Hydrocodone/Acetaminophen (Hydrocodone-Acetamin 5-325 mg) 5 Mg-325 Mg Tablet, 1 

TAB PO Q6H PRN for PAIN-MODERATE (5-7)


   Prescribed by: OLIVERIO DEVINE on 22


Irbesartan/Hydrochlorothiazide (Irbesartan-Hctz 300-12.5 mg Tb) 1 Each Tablet, 1

EACH PO DAILY, (Reported)


   Entered as Reported by: ADRIAN DAVIS on 10/9/19 0902


Latanoprost (Latanoprost) 2.5 Ml Drops, 1 DROP OU HS, (Reported)


   Entered as Reported by: ADRIAN DAVIS on 10/9/19 0902


Levothyroxine Sodium (Levothyroxine Sodium) 25 Mcg Tablet, 25 MCG PO DAILY, 

(Reported)


   Entered as Reported by: ADRIAN DAVIS on 10/9/19 0902


Multivitamin with Minerals (One Daily Complete) 1 Each Tablet, 1 EACH PO HS, 

(Reported)


   Entered as Reported by: ADRIAN DAVIS on 10/9/19 0908


Naproxen Sodium (Aleve) 220 Mg Tablet, 220 MG PO Q6H PRN for PAIN-MILD, (Report

ed)


   Entered as Reported by: ADRIAN DAVIS on 10/9/19 0902


Pumpkin Seed Extract/Soy Germ (Azo Bladder Control Capsule) 300 Mg Capsule, 300 

MG PO DAILY, (Reported)


   Entered as Reported by: ADRIAN DAVIS on 10/9/19 0902


Simvastatin (Simvastatin) 20 Mg Tablet, 20 MG PO HS, (Reported)


   Entered as Reported by: ADRIAN DAVIS on 10/9/19 0902


Timolol Maleate (Timolol Maleate 0.5%) 5 Ml Drops, 1 DROP OU DAILY, (Reported)


   Entered as Reported by: ADRIAN DAVIS on 10/9/19 0902





Review of Systems


Review of Systems


Constitutional:  No chills, No fever


EENTM:  no symptoms reported


Respiratory:  no symptoms reported


Cardiovascular:  no symptoms reported


Gastrointestinal:  no symptoms reported


Genitourinary:  no symptoms reported


Musculoskeletal:  no symptoms reported


Skin:  see HPI


Psychiatric/Neurological:  No Symptoms Reported


Endocrine:  No Symptoms Reported


Hematologic/Lymphatic:  No Symptoms Reported





Past Medical-Social-Family Hx


Patient Social History


Tobacco Use?:  No


Substance use?:  No


Alcohol Use?:  No





Immunizations Up To Date


COVID19 Vaccine :  MODERNA





Seasonal Allergies


Seasonal Allergies:  Yes





Past Medical History


Surgeries:  Yes


Gallbladder


Respiratory:  No


Currently Using CPAP:  No


Currently Using BIPAP:  No


Cardiac:  Yes


Angina, Heart Murmur, High Cholesterol, Hypertension


Neurological:  No


Genitourinary:  Yes ("OVERACTIVE" BLADDER)


Gastrointestinal:  No


Musculoskeletal:  No


Endocrine:  Yes


Hypothyroidsim


HEENT:  Yes


Glaucoma


Cancer:  No


Psychosocial:  No


Integumentary:  No


Blood Disorders:  No


Adverse Reaction/Blood Tranf:  No





Physical Exam


Vital Signs





Vital Signs - First Documented








 22





 17:05


 


Temp 37.1


 


Pulse 83


 


Resp 22


 


B/P (MAP) 130/80 (97)


 


Pulse Ox 95


 


O2 Delivery Room Air





Capillary Refill : Less Than 3 Seconds


General Appearance:  WD/WN, no apparent distress


HEENT:  PERRL/EOMI, normal ENT inspection, TMs normal, pharynx normal


Neck:  full range of motion, normal inspection


Cardiovascular:  regular rate, rhythm; No no edema (BLE edema )


Respiratory:  lungs clear, normal breath sounds, no respiratory distress, no 

accessory muscle use


Gastrointestinal:  normal bowel sounds, non tender, soft


Extremities:  No normal range of motion (obese, limited ROM, wheelchair bound );

normal capillary refill, pedal edema (BLE), swelling


Neurologic/Psychiatric:  no motor/sensory deficits, alert, normal mood/affect, 

oriented x 3


Skin:  normal color, warm/dry


Skin Problem Location:  lower extremities


Skin Problem Character:  bullous, erythema, scales, warm





Progress/Results/Core Measures


Results/Orders


Lab Results





Laboratory Tests








Test


 22


17:45 Range/Units


 


 


White Blood Count


 8.1 


 4.3-11.0


10^3/uL


 


Red Blood Count


 4.74 


 3.80-5.11


10^6/uL


 


Hemoglobin 13.9  11.5-16.0  g/dL


 


Hematocrit 44  35-52  %


 


Mean Corpuscular Volume 93  80-99  fL


 


Mean Corpuscular Hemoglobin 29  25-34  pg


 


Mean Corpuscular Hemoglobin


Concent 32 


 32-36  g/dL





 


Red Cell Distribution Width 13.2  10.0-14.5  %


 


Platelet Count


 294 


 130-400


10^3/uL


 


Mean Platelet Volume 9.9  9.0-12.2  fL


 


Immature Granulocyte % (Auto) 1   %


 


Neutrophils (%) (Auto) 69  42-75  %


 


Lymphocytes (%) (Auto) 19  12-44  %


 


Monocytes (%) (Auto) 8  0-12  %


 


Eosinophils (%) (Auto) 3  0-10  %


 


Basophils (%) (Auto) 0  0-10  %


 


Neutrophils # (Auto)


 5.5 


 1.8-7.8


10^3/uL


 


Lymphocytes # (Auto)


 1.5 


 1.0-4.0


10^3/uL


 


Monocytes # (Auto)


 0.7 


 0.0-1.0


10^3/uL


 


Eosinophils # (Auto)


 0.2 


 0.0-0.3


10^3/uL


 


Basophils # (Auto)


 0.0 


 0.0-0.1


10^3/uL


 


Immature Granulocyte # (Auto)


 0.1 


 0.0-0.1


10^3/uL


 


Sodium Level 142  135-145  MMOL/L


 


Potassium Level 4.4  3.6-5.0  MMOL/L


 


Chloride Level 103    MMOL/L


 


Carbon Dioxide Level 24  21-32  MMOL/L


 


Anion Gap 15 H 5-14  MMOL/L


 


Blood Urea Nitrogen 23 H 7-18  MG/DL


 


Creatinine


 1.09 


 0.60-1.30


MG/DL


 


Estimat Glomerular Filtration


Rate 56 


  





 


BUN/Creatinine Ratio 21   


 


Glucose Level 175 H   MG/DL


 


Calcium Level 10.5 H 8.5-10.1  MG/DL


 


Corrected Calcium 10.4 H 8.5-10.1  MG/DL


 


Total Bilirubin 0.6  0.1-1.0  MG/DL


 


Aspartate Amino Transf


(AST/SGOT) 20 


 5-34  U/L





 


Alanine Aminotransferase


(ALT/SGPT) 19 


 0-55  U/L





 


Alkaline Phosphatase 100    U/L


 


Total Protein 7.6  6.4-8.2  GM/DL


 


Albumin 4.1  3.2-4.5  GM/DL








Micro Results





Microbiology


22 Gram Stain - Final, Resulted


          


22 Wound Culture - Preliminary, Resulted


          Mixed Bacterial Nelda


          Staphylococcus aureus


          Pseudomonas aeruginosa





My Orders





Orders - OLIVERIO DEVINE


Ed Iv/Invasive Line Start (22 17:38)


Cbc With Automated Diff (22 17:38)


Comprehensive Metabolic Panel (22 17:38)


Wound Culture (22 17:38)


Hydrocodone/Apap 5/325 Tablet (Lortab 5 (22 17:45)


Foot, Right, 2 View (22 17:38)


Clindamycin Capsule (Cleocin Capsule) (22 18:15)


Hydrocodone/Apap 5/325 Tablet (Lortab 5 (22 18:07)





Medications Given in ED





Vital Signs/I&O











 22





 17:05 19:15


 


Temp 37.1 36.6


 


Pulse 83 81


 


Resp 22 18


 


B/P (MAP) 130/80 (97) 171/81


 


Pulse Ox 95 96


 


O2 Delivery Room Air Room Air














Blood Pressure Mean:                    97











Diagnostic Imaging





   Diagonstic Imaging:  Xray


Comments


[facility] [place], [place]





NAME:   YOHANNES TAY


MED REC#:   [id number]


ACCOUNT#:   [id number]


PT STATUS:   DEP ER


:   1955


PHYSICIAN:   OLIVERIO DEVINE


ADMIT DATE:   22/ER


***Signed***


Date of Exam:22





FOOT, RIGHT, 2 VIEW








INDICATION: Bilateral foot pain, swelling and redness with warmth


and blisters.





EXAMINATION: Right foot, 2022.





FINDINGS: 2 views of the foot.





There is marked soft tissue prominence about the foot and ankle.


Plantar calcaneal spurring is noted with marked spurring and


degenerative disease along the dorsum of the midfoot. There is a


hallux valgus deformity with narrowing at the 1st


metatarsophalangeal joint. No acute fracture or dislocation. No


destructive osseous findings.





IMPRESSION: Diffuse chronic findings with no acute osseous


abnormality. 





Dictated by: 





  Dictated on workstation # [id number]








Dict:   22


Trans:   22


[facility] [id number]





Interpreted by:     NATTY KING MD


Electronically signed by: NATTY KING MD 22





Departure


Impression





   Primary Impression:  


   Cellulitis of both lower extremities


Disposition:  01 HOME, SELF-CARE


Condition:  Stable





Departure-Patient Inst.


Decision time for Depature:  18:18


Referrals:  


MAXIMILIANO ALMEIDA MD (PCP/Family)


Primary Care Physician


Patient Instructions:  Cellulitis (Skin Infection), Adult ED





Add. Discharge Instructions:  


Plan: 


1. Take Clindamycin daily as directed. 


2. Have close follow up with Dr. Almeida, please call office tomorrow to 

schedule. 


3. Wash gently with mild soap and water, pat dry. 


4. Return for any new, concerning, or worsening symptoms. 





All discharge instructions reviewed with patient and/or family. Voiced 

understanding.


Scripts


Hydrocodone/Acetaminophen (Hydrocodone-Acetamin 5-325 mg) 5 Mg-325 Mg Tablet


1 TAB PO Q6H PRN for PAIN-MODERATE (5-7), #10 TAB 0 Refills


   Prov: OLIVERIO DEVINE APRN         22 


Clindamycin HCl (Clindamycin HCl) 300 Mg Capsule


300 MG PO TID for 7 Days, #21 CAP 0 Refills


   Prov: OLIVERIO DEVINE APRN         22











OLIVERIO DEVINE APRN           2022 18:21

## 2022-08-12 ENCOUNTER — HOSPITAL ENCOUNTER (OUTPATIENT)
Dept: HOSPITAL 75 - RAD | Age: 67
End: 2022-08-12
Attending: FAMILY MEDICINE
Payer: MEDICARE

## 2022-08-12 DIAGNOSIS — I51.7: Primary | ICD-10-CM

## 2022-08-12 PROCEDURE — 71046 X-RAY EXAM CHEST 2 VIEWS: CPT

## 2022-08-12 NOTE — DIAGNOSTIC IMAGING REPORT
INDICATION: Shortness of breath.



Comparison is made to prior chest 10/09/2019.



Heart is enlarged. Lung volumes are slightly diminished likely

owing to poor aspiration. No infiltrate or failure is seen. There

is no effusion or pneumothorax.



IMPRESSION: Cardiomegaly. No other significant abnormality is

detected.



Dictated by: 



  Dictated on workstation # [id number]

## 2023-09-15 ENCOUNTER — HOSPITAL ENCOUNTER (OUTPATIENT)
Dept: HOSPITAL 75 - CARD | Age: 68
End: 2023-09-15
Attending: INTERNAL MEDICINE
Payer: MEDICARE

## 2023-09-15 DIAGNOSIS — I11.9: Primary | ICD-10-CM

## 2023-09-15 PROCEDURE — 93306 TTE W/DOPPLER COMPLETE: CPT
